# Patient Record
Sex: FEMALE | Race: WHITE | NOT HISPANIC OR LATINO | Employment: FULL TIME | URBAN - METROPOLITAN AREA
[De-identification: names, ages, dates, MRNs, and addresses within clinical notes are randomized per-mention and may not be internally consistent; named-entity substitution may affect disease eponyms.]

---

## 2018-05-23 ENCOUNTER — CONVERSION ENCOUNTER (OUTPATIENT)
Dept: SURGERY | Facility: CLINIC | Age: 23
End: 2018-05-23

## 2018-05-23 ENCOUNTER — OFFICE VISIT CONVERTED (OUTPATIENT)
Dept: SURGERY | Facility: CLINIC | Age: 23
End: 2018-05-23
Attending: SURGERY

## 2019-07-16 ENCOUNTER — OFFICE VISIT CONVERTED (OUTPATIENT)
Dept: SURGERY | Facility: CLINIC | Age: 24
End: 2019-07-16
Attending: SURGERY

## 2019-08-15 ENCOUNTER — HOSPITAL ENCOUNTER (OUTPATIENT)
Dept: PERIOP | Facility: HOSPITAL | Age: 24
Setting detail: HOSPITAL OUTPATIENT SURGERY
Discharge: HOME | End: 2019-08-15
Attending: SURGERY

## 2019-08-15 LAB — HCG UR QL: NEGATIVE

## 2019-08-30 ENCOUNTER — OFFICE VISIT CONVERTED (OUTPATIENT)
Dept: SURGERY | Facility: CLINIC | Age: 24
End: 2019-08-30
Attending: SURGERY

## 2019-10-15 ENCOUNTER — HOSPITAL ENCOUNTER (OUTPATIENT)
Dept: PHYSICAL THERAPY | Facility: CLINIC | Age: 24
Setting detail: RECURRING SERIES
Discharge: HOME | End: 2019-11-19
Attending: NURSE PRACTITIONER

## 2019-11-21 ENCOUNTER — OFFICE VISIT CONVERTED (OUTPATIENT)
Dept: NEUROLOGY | Facility: CLINIC | Age: 24
End: 2019-11-21
Attending: PSYCHIATRY & NEUROLOGY

## 2020-04-17 ENCOUNTER — TELEMEDICINE CONVERTED (OUTPATIENT)
Dept: NEUROLOGY | Facility: CLINIC | Age: 25
End: 2020-04-17
Attending: PSYCHIATRY & NEUROLOGY

## 2020-06-24 ENCOUNTER — CONVERSION ENCOUNTER (OUTPATIENT)
Dept: NEUROLOGY | Facility: CLINIC | Age: 25
End: 2020-06-24

## 2020-06-24 ENCOUNTER — OFFICE VISIT CONVERTED (OUTPATIENT)
Dept: NEUROLOGY | Facility: CLINIC | Age: 25
End: 2020-06-24
Attending: PSYCHIATRY & NEUROLOGY

## 2020-08-20 ENCOUNTER — OFFICE VISIT CONVERTED (OUTPATIENT)
Dept: NEUROLOGY | Facility: CLINIC | Age: 25
End: 2020-08-20
Attending: PSYCHIATRY & NEUROLOGY

## 2020-10-20 ENCOUNTER — HOSPITAL ENCOUNTER (OUTPATIENT)
Dept: SLEEP MEDICINE | Facility: HOSPITAL | Age: 25
Discharge: HOME | End: 2020-10-20
Attending: PSYCHIATRY & NEUROLOGY

## 2020-11-20 ENCOUNTER — OFFICE VISIT CONVERTED (OUTPATIENT)
Dept: NEUROLOGY | Facility: CLINIC | Age: 25
End: 2020-11-20
Attending: PSYCHIATRY & NEUROLOGY

## 2021-02-23 ENCOUNTER — OFFICE VISIT CONVERTED (OUTPATIENT)
Dept: NEUROLOGY | Facility: CLINIC | Age: 26
End: 2021-02-23
Attending: PSYCHIATRY & NEUROLOGY

## 2021-05-12 NOTE — PROGRESS NOTES
Progress Note      Patient Name: Mima Wolfe   Patient ID: [id number]   Sex: Female   YOB: 1995    Primary Care Provider: Wilbur Avendaño Jr. MD   Referring Provider: Bonnie MENDEZ    Visit Date: April 17, 2020    Provider: Zane Wilkerson MD   Location: [facility]   Location Address: [address] [place] [address]  [id number]   Location Phone: [phone]          Chief Complaint     former [place] patient SAMANIEGO       History Of Present Illness  Video Conferencing Visit  Mima Wolfe is a 24 year old /White female who is presenting for evaluation via video conferencing. Verbal consent obtained before beginning visit.   The following staff were present during this visit: INPUT BOX   Mima Wolfe is a 24 year old /White female who presents today to [facility] today referred from Bonnie MENDEZ.      24-year-old woman video telehealth visit for headaches.  She states that she is having a headache at least 2-3 times a week lasting for a few hours to a day.  She failed topiramate in the past.  She failed amitriptyline.  She stopped taking amitriptyline 2 months ago.  She took it for 2 months.  She states that the headaches are associated light and noise sensitivity, nausea.  She had a Chiari malformation decompression years ago.  She is supposed to get CGRP inhibitors for follow-up.       Past Medical History  Gallstones; Migraines         Past Surgical History  Brain Surgery; Cholecystectomy; EYE SURGERY; [place] Tooth Extraction         Medication List  amitriptyline 25 mg oral tablet; Lo Loestrin Fe 1 mg-10 mcg (24)/10 mcg (2) oral tablet         Allergy List  Zithromax         Family Medical History  Stroke; Cancer, Unspecified; Diabetes         Social History  Alcohol (Current some day); lives alone; Single; Tobacco (Never); Working         Review of Systems  · Constitutional  o Denies  o : chills, excessive  sweating, fatigue, fever, sycope/passing out, weight gain, weight loss  · Eyes  o Admits  o : changes in vision  o Denies  o : blurry vision, double vision  · HENT  o Denies  o : loss of hearing, ringing in the ears, ear aches, sore throat, nasal congestion, sinus pain, nose bleeds, seasonal allergies  · Cardiovascular  o Denies  o : blood clots, swollen legs, anemia, easy burising or bleeding, transfusions  · Respiratory  o Denies  o : shortness of breath, dry cough, productive cough, pneumonia, COPD  · Gastrointestinal  o Denies  o : difficulty swallowing, reflux  · Genitourinary  o Denies  o : incontinence  · Neurologic  o Admits  o : headache  o Denies  o : seizure, stroke, tremor, loss of balance, falls, dizziness/vertigo, difficulty with sleep, numbness/tingling/paresthesia , difficulty with coordination, difficulty with dexterity, weakness  · Musculoskeletal  o Denies  o : neck stiffness/pain, swollen lymph nodes, muscle aches, joint pain, weakness, spasms, sciatica, pain radiating in arm, pain radiating in leg, low back pain  · Endocrine  o Denies  o : diabetes, thyroid disorder  · Psychiatric  o Admits  o : anxiety  o Denies  o : depression      Physical Examination     Alert, fluent, phasic, follows commands well.           Assessment  · Chronic migraine without aura     346.70/G43.709  I will see her again in the next 10 days to 14 days to have CGRP inhibitor injection.    Problems Reconciled  Plan  · Medications  o Medications have been Reconciled  o Transition of Care or Provider Policy  · Instructions  o Follow Up in 2 weeks.            Electronically Signed by: Zane Wilkerson MD -Author on April 17, 2020 03:07:46 PM

## 2021-05-13 NOTE — PROGRESS NOTES
Progress Note      Patient Name: Mima Wolfe   Patient ID: [id number]   Sex: Female   YOB: 1995    Primary Care Provider: Wilbur Avendaño Jr. MD   Referring Provider: Bonnie MENDEZ    Visit Date: June 24, 2020    Provider: Zane Wilkerson MD   Location: [facility]   Location Address: [address] [place] [address]  [id number]   Location Phone: [phone]          Chief Complaint     F/u chronic migraine without aura, post two Aimovig injections.       History Of Present Illness  Mima Wolfe is a 24 year old /White female who presents today to [facility] today referred from Bonnie MENDEZ.      24-year-old woman here for follow-up for migraine headaches.  She is taking Aimovig 70 mg that she is had 2 injections.  She states her headaches have significantly improved she is no longer having headaches.  She states that she may get a mild headache 2 times a week which is very slight and she has not had any migraine headaches.  She states in the past she was getting 3-4 severe headaches a week and a daily headache.    She states that the first month she did not know if it was the Aimovig or her Chiari malformation at had her getting intermittently weak and tingling in her hands and feet lasting for 2 hours at a time 2-3 times a week.  In the last month she is not had any symptoms.       Past Medical History  Gallstones; Migraines         Past Surgical History  Brain Surgery; Cholecystectomy; EYE SURGERY; [place] Tooth Extraction         Medication List  Aimovig Autoinjector 70 mg/mL subcutaneous auto-injector; amitriptyline 25 mg oral tablet; Lo Loestrin Fe 1 mg-10 mcg (24)/10 mcg (2) oral tablet; sertraline 50 mg oral tablet         Allergy List  Zithromax         Family Medical History  Stroke; Cancer, Unspecified; Diabetes         Social History  Alcohol (Current some day); lives alone; Single; Tobacco (Never); Working  "        Review of Systems  · Constitutional  o Admits  o : fatigue  o Denies  o : chills, excessive sweating, fever, sycope/passing out, weight gain, weight loss  · Eyes  o Admits  o : changes in vision  o Denies  o : blurry vision, double vision  · HENT  o Denies  o : loss of hearing, ringing in the ears, ear aches, sore throat, nasal congestion, sinus pain, nose bleeds, seasonal allergies  · Cardiovascular  o Denies  o : blood clots, swollen legs, anemia, easy burising or bleeding, transfusions  · Respiratory  o Denies  o : shortness of breath, dry cough, productive cough, pneumonia, COPD  · Gastrointestinal  o Denies  o : difficulty swallowing, reflux  · Genitourinary  o Denies  o : incontinence  · Neurologic  o Admits  o : headache, dizziness/vertigo, weakness  o Denies  o : seizure, stroke, tremor, loss of balance, falls, difficulty with sleep, numbness/tingling/paresthesia , difficulty with coordination, difficulty with dexterity  · Musculoskeletal  o Denies  o : neck stiffness/pain, swollen lymph nodes, muscle aches, joint pain, weakness, spasms, sciatica, pain radiating in arm, pain radiating in leg, low back pain  · Endocrine  o Denies  o : diabetes, thyroid disorder  · Psychiatric  o Admits  o : anxiety  o Denies  o : depression      Vitals  Date Time BP Position Site L\R Cuff Size HR RR TEMP (F) WT  HT  BMI kg/m2 BSA m2 O2 Sat HC       06/24/2020 03:45 PM        97.6         06/24/2020 04:18 /63 Sitting    185 - R   184lbs 16oz 5'  3\" 32.77 1.93           Physical Examination     She is alert, fluent, phasic, follows commands well.  Optic disks are normal bilaterally, visual fields are full, EOMs full directions gaze, facial strength is full, soft palate elevation and tongue normal.  There is no weakness of the upper or lower extremities.  Reflexes are normoactive and symmetrical.  Heart is regular in rhythm and normal in rate           Assessment  · Chronic migraine without " vianney     346.70/G43.709  She is to continue taking Aimovig 70 mg subcutaneously monthly. I will see her again in 6 months time for follow-up. I told her that if her headaches are controlled at the time I might take her off Aimovig.    I told her that her Chiari malformation is not symptomatic.    Problems Reconciled  Plan  · Medications  o Medications have been Reconciled  o Transition of Care or Provider Policy  · Instructions  o Encouraged to follow-up with Primary Care Provider for preventative care.  o Follow up in 6 months.             Electronically Signed by: Zane Wilkerson MD -Author on June 24, 2020 05:02:34 PM

## 2021-05-13 NOTE — PROGRESS NOTES
Progress Note      Patient Name: Mima Wolfe   Patient ID: [id number]   Sex: Female   YOB: 1995    Primary Care Provider: Wilbur Avendaño Jr. MD   Referring Provider: Bonnie MENDEZ    Visit Date: August 20, 2020    Provider: Zane Wilkerson MD   Location: [facility]   Location Address: [address] [place] [address]  [id number]   Location Phone: [phone]          Chief Complaint     F/u chronic migraine without aura.       History Of Present Illness  Mima Wolfe is a 24 year old /White female who presents today to [facility] today referred from Bonnie MENDEZ.      24-year-old woman here for follow-up for headaches.  She states that she has had 4 to 5 injections of Aimovig 70 mg and it was working for her that she was only getting 3 severe headaches a month that lasted for 2 hours at a time.  She states in the last 3 weeks has had a headache almost on a daily basis and this lasting for 8 hours.  She states that she is getting headache at least 8 times a month the last few months to get a moderate headache that she will take pain medication such as Excedrin or ibuprofen.  She was seen by an optometrist and was told that she has pseudotumor cerebri and was seen by another optometrist at [place] and [facility] told her that she did not.  She went to the emergency room because of the severe headaches and she had a lumbar puncture in which she had an opening pressure of 22 cm but she was in a fetal position when the pressure was obtained.  The rest of her spinal fluid was unremarkable.    She had a history of Chiari I malformation surgery in 2007 at [facility].  She states that she is not under a lot of stress.  She states most of her headaches get worse when she wakes up in the morning.  She states that even when her headaches were controlled she was feeling tired and sleepy during the daytime and did not feel well  rested in the morning.  This been going on for years.  She is living by herself.  She has no history of depression.       Past Medical History  Gallstones; Migraines         Past Surgical History  Brain Surgery; Cholecystectomy; EYE SURGERY; [place] Tooth Extraction         Medication List  Aimovig Autoinjector 70 mg/mL subcutaneous auto-injector; Xulane 150-35 mcg/24 hr transdermal patch weekly         Allergy List  Reglan         Family Medical History  Stroke; Cancer, Unspecified; Diabetes         Social History  Alcohol (Current some day); lives alone; Single; Tobacco (Never); Working         Review of Systems  · Constitutional  o Admits  o : fatigue  o Denies  o : chills, excessive sweating, fever, sycope/passing out, weight gain, weight loss  · Eyes  o Admits  o : changes in vision, blurry vision, double vision  · HENT  o Denies  o : loss of hearing, ringing in the ears, ear aches, sore throat, nasal congestion, sinus pain, nose bleeds, seasonal allergies  · Cardiovascular  o Denies  o : blood clots, swollen legs, anemia, easy burising or bleeding, transfusions  · Respiratory  o Denies  o : shortness of breath, dry cough, productive cough, pneumonia, COPD  · Gastrointestinal  o Denies  o : difficulty swallowing, reflux  · Genitourinary  o Denies  o : incontinence  · Neurologic  o Admits  o : headache, falls, dizziness/vertigo, weakness  o Denies  o : seizure, stroke, tremor, loss of balance, difficulty with sleep, numbness/tingling/paresthesia , difficulty with coordination, difficulty with dexterity  · Musculoskeletal  o Admits  o : neck stiffness/pain, muscle aches, joint pain, weakness, spasms  o Denies  o : swollen lymph nodes, sciatica, pain radiating in arm, pain radiating in leg, low back pain  · Endocrine  o Denies  o : diabetes, thyroid disorder  · Psychiatric  o Admits  o : anxiety  o Denies  o : depression      Vitals  Date Time BP Position Site L\R Cuff Size HR RR TEMP (F) WT  HT  BMI kg/m2 BSA m2  "O2 Sat HC       08/20/2020 03:40 PM        97.3         08/20/2020 03:58 /61 Sitting    88 - R   188lbs 0oz 5'  3\" 33.3 1.95           Physical Examination  · Constitutional  o Appearance  o : well-nourished, well groomed, in no apparent distress  · Eyes  o Pupils and Irises  o : Pupils equal, round, and reactive to light and accommodation bilaterally  · Cardiovascular  o Heart  o :   § Auscultation of Heart  § : Regular rate and rhythm   · Musculoskeletal  o General  o : Normal bulk and normal tone throughout. 5/5 motor strength throughout and symmetric.   · Neurologic  o Mental Status Examination  o :   § Fund of Knowledge  § : Adequate fund of knowledge  o Cranial Nerves  o : Pupils are equal, round and reactive to light. Extraocular movements are intact. Visual fields are full. Fundoscopic examination reveals sharp disc bilaterally. Muscles of facial expression are strong and symmetric.   o Gait and Station  o :   § Gait Screening  § : Normal, narrow based gait, with a normal arm swing. is able to tiptoe, heel walk, ish and tandem without difficulty.          Assessment  · Chronic migraine without aura     346.70/G43.709  I will increase her Aimovig to 140 mg monthly subcutaneously. She is to call us in the next 2 month if there is no improvement of her headaches and I may schedule her for Botox injections. She just received her Aimovig 70 mg last week and therefore she is not going to inject herself with the 140 mg dose until next month. For abortive treatment I will give her sumatriptan 50 mg at onset of headache and repeat again endorses needed maximum 2/day or 8/month. Told her not to take sumatriptan more than 4 migraines a month. I also told her not to take over-the-counter medication such as Tylenol, Excedrin, ibuprofen for headaches. I will also give her Nurtec and gave her literature and a card. He is to call us for any problems. I told her that she can take down or take 1 time a day or up to 8 " migraines a month. Explained to her the adverse effects of the medication.  · Excessive daytime sleepiness     780.54/G47.19  I will set her up for sleep consultation at [facility] sleep clinic. I will see her again in 3 months time for follow-up.    40 minutes was spent for this high complexity visit and more than half the time was spent face-to-face with the patient examination, counseling, planning, reviewed the chart and treatment.    Problems Reconciled  Plan  · Orders  o Sleep Disorder Clinic Consultation (SLEEP) - 346.70/G43.709 - 2020   Excessive daytime somnolence  · Medications  o sumatriptan succinate 50 mg oral tablet   SI at onset of headache and repeat again in 2 hours as needed maximum of 2/day or 8/month.   DISP: (9) tablets with 6 refills  Prescribed on 2020     o Nurtec ODT 75 mg oral tablet,disintegrating   SIG: take 1tab(75mg) place on top of tongue, allow to dissolve then swallow orally once prn; max 1 dose/24hrs   DISP: (8) tablets with 4 refills  Adjusted on 2020     o Medications have been Reconciled  o Transition of Care or Provider Policy  · Instructions  o Encouraged to follow-up with Primary Care Provider for preventative care.  o Follow up in 3 months.             Electronically Signed by: Zane Wilkerson MD -Author on 2020 05:07:06 PM

## 2021-05-14 VITALS
HEIGHT: 63 IN | SYSTOLIC BLOOD PRESSURE: 112 MMHG | DIASTOLIC BLOOD PRESSURE: 76 MMHG | BODY MASS INDEX: 34.39 KG/M2 | WEIGHT: 194.12 LBS | HEART RATE: 86 BPM

## 2021-05-14 VITALS
DIASTOLIC BLOOD PRESSURE: 84 MMHG | WEIGHT: 95 LBS | BODY MASS INDEX: 16.83 KG/M2 | TEMPERATURE: 97.5 F | HEIGHT: 63 IN | HEART RATE: 95 BPM | SYSTOLIC BLOOD PRESSURE: 134 MMHG

## 2021-05-14 VITALS
SYSTOLIC BLOOD PRESSURE: 116 MMHG | BODY MASS INDEX: 33.31 KG/M2 | DIASTOLIC BLOOD PRESSURE: 61 MMHG | HEIGHT: 63 IN | WEIGHT: 188 LBS | HEART RATE: 88 BPM | TEMPERATURE: 97.3 F

## 2021-05-14 NOTE — PROGRESS NOTES
Progress Note      Patient Name: Mima Wolfe   Patient ID: [id number]   Sex: Female   YOB: 1995    Primary Care Provider: Wilbur Avendaño Jr. MD   Referring Provider: Bonnie MENDEZ    Visit Date: February 23, 2021    Provider: Zane Wilkerson MD   Location: [facility]   Location Address: [address] [place] [address]  [id number]   Location Phone: [phone]          Chief Complaint     Patient here for migraine follow up       History Of Present Illness  Mima Wolfe is a 25 year old /White female who presents today to [facility] today referred from Bonnie MENDEZ.      25-year-old woman here for follow-up for headaches patient states her headaches have significantly improved.  She has mild to moderate headaches that usually go away with Nurtec.  In January she took to neurotoxin in February just 1.  She stopped taking Aimovig in December.  Prior to that she took Aimovig 70 mg in November.  She was without Aimovig for 2 months prior to that.  She states that her insurance changed and passport will no longer pay for Aimovig.  She states that the Nurtec helps her significantly.  In the past she took Imitrex and Maxalt that did not help her significantly.       Past Medical History  Gallstones; Migraines         Past Surgical History  Brain Surgery; Cholecystectomy; EYE SURGERY; [place] Tooth Extraction         Medication List  Aimovig Autoinjector 140 mg/mL subcutaneous auto-injector; Kyleena 17.5 mcg/24 hrs (5 yrs) 19.5 mg intrauterine intrauterine device; Nurtec ODT 75 mg oral tablet,disintegrating         Allergy List  Reglan         Family Medical History  Stroke; Cancer, Unspecified; Diabetes         Social History  Alcohol (Current some day); lives alone; Single; Tobacco (Never); Working         Review of Systems  · Constitutional  o Denies  o : chills, excessive sweating, fatigue, fever, sycope/passing  "out, weight gain, weight loss  · Eyes  o Denies  o : changes in vision, blurred vision, double vision  · HENT  o Denies  o : hearing loss, ringing in the ears, ear aches, sore throat, nasal congestion, sinus pain, nose bleeds, seasonal allergies  · Cardiovascular  o Denies  o : blood clots, swollen legs, anemia, easy burising or bleeding, transfusions  · Respiratory  o Denies  o : shortness of breath, dry cough, productive cough, pneumonia, COPD  · Gastrointestinal  o Denies  o : dysphagia, reflux  · Genitourinary  o Denies  o : incontinence  · Neurologic  o Admits  o : headache  o Denies  o : seizure, stroke, tremor, loss of balance, falls, dizziness/vertigo, difficulty with sleep, numbness/tingling/paresthesia , difficulty with coordination, difficulty with dexterity, weakness  · Musculoskeletal  o Denies  o : neck stiffness/pain, swollen lymph nodes, muscle aches, joint pain, weakness, spasms, sciatica, pain radiating in arm, pain radiating in leg, low back pain  · Endocrine  o Denies  o : diabetes, thyroid disorder  · Psychiatric  o Denies  o : anxiety, depression      Vitals  Date Time BP Position Site L\R Cuff Size HR RR TEMP (F) WT  HT  BMI kg/m2 BSA m2 O2 Sat FR L/min FiO2 HC       02/23/2021 03:16 /76 Sitting    86 - R   194lbs 2oz 5'  3\" 34.39 1.98             Physical Examination     She is alert, fluent, phasic, follows commands well.  Optic nerves are normal bilaterally.  There is no focal weakness.  Heart is regular in rhythm normal in rate.           Assessment  · Chronic migraine without aura     346.70/G43.709  She is to take Nurtec for abortive treatment. Should her headaches become more frequent in the future having 2-3 headaches that are severe not responsive to abortive treatment she can call us and we will prescribe to her Em[facility]. Otherwise I will see her again in 6 months time for follow-up. Should her headaches be controlled at that time she can cancel appointment.    Total time spent " with patient coordinating patient care was 15 minutes.      Plan  · Medications  o Medications have been Reconciled  o Transition of Care or Provider Policy  · Instructions  o Encouraged to follow-up with Primary Care Provider for preventative care.            Electronically Signed by: Zane Wilkerson MD -Author on February 23, 2021 04:03:47 PM

## 2021-05-15 VITALS
SYSTOLIC BLOOD PRESSURE: 126 MMHG | HEART RATE: 185 BPM | WEIGHT: 185 LBS | BODY MASS INDEX: 32.78 KG/M2 | DIASTOLIC BLOOD PRESSURE: 63 MMHG | HEIGHT: 63 IN | TEMPERATURE: 97.6 F

## 2021-05-15 VITALS
HEIGHT: 63 IN | HEART RATE: 78 BPM | WEIGHT: 191 LBS | BODY MASS INDEX: 33.84 KG/M2 | DIASTOLIC BLOOD PRESSURE: 63 MMHG | SYSTOLIC BLOOD PRESSURE: 113 MMHG

## 2021-05-15 VITALS — WEIGHT: 191 LBS | RESPIRATION RATE: 16 BRPM | HEIGHT: 64 IN | BODY MASS INDEX: 32.61 KG/M2

## 2021-05-15 VITALS — WEIGHT: 190 LBS | BODY MASS INDEX: 32.44 KG/M2 | RESPIRATION RATE: 18 BRPM | HEIGHT: 64 IN

## 2021-05-16 VITALS — BODY MASS INDEX: 27.29 KG/M2 | WEIGHT: 154 LBS | HEIGHT: 63 IN | RESPIRATION RATE: 14 BRPM

## 2021-07-27 ENCOUNTER — PREP FOR SURGERY (OUTPATIENT)
Dept: OTHER | Facility: HOSPITAL | Age: 26
End: 2021-07-27

## 2021-07-27 ENCOUNTER — OFFICE VISIT (OUTPATIENT)
Dept: GASTROENTEROLOGY | Facility: CLINIC | Age: 26
End: 2021-07-27

## 2021-07-27 VITALS
DIASTOLIC BLOOD PRESSURE: 58 MMHG | SYSTOLIC BLOOD PRESSURE: 111 MMHG | HEART RATE: 82 BPM | WEIGHT: 198.5 LBS | BODY MASS INDEX: 33.89 KG/M2 | OXYGEN SATURATION: 100 % | RESPIRATION RATE: 20 BRPM | HEIGHT: 64 IN

## 2021-07-27 DIAGNOSIS — R14.0 ABDOMINAL BLOATING: ICD-10-CM

## 2021-07-27 DIAGNOSIS — R10.84 GENERALIZED ABDOMINAL PAIN: ICD-10-CM

## 2021-07-27 DIAGNOSIS — R93.5 ABNORMAL ABDOMINAL CT SCAN: ICD-10-CM

## 2021-07-27 DIAGNOSIS — K21.9 GASTROESOPHAGEAL REFLUX DISEASE, UNSPECIFIED WHETHER ESOPHAGITIS PRESENT: ICD-10-CM

## 2021-07-27 DIAGNOSIS — Z80.0 FAMILY HISTORY OF COLON CANCER: Primary | ICD-10-CM

## 2021-07-27 DIAGNOSIS — R19.7 DIARRHEA, UNSPECIFIED TYPE: ICD-10-CM

## 2021-07-27 DIAGNOSIS — R11.0 NAUSEA: ICD-10-CM

## 2021-07-27 PROCEDURE — 99204 OFFICE O/P NEW MOD 45 MIN: CPT | Performed by: NURSE PRACTITIONER

## 2021-07-27 RX ORDER — FAMOTIDINE 20 MG/1
20 TABLET, FILM COATED ORAL
Qty: 30 TABLET | Refills: 2 | Status: SHIPPED | OUTPATIENT
Start: 2021-07-27 | End: 2021-11-30

## 2021-07-27 RX ORDER — MONTELUKAST SODIUM 4 MG/1
2 TABLET, CHEWABLE ORAL 2 TIMES DAILY
Qty: 120 TABLET | Refills: 2 | Status: SHIPPED | OUTPATIENT
Start: 2021-07-27 | End: 2021-11-30

## 2021-07-27 RX ORDER — BENZOYL PEROXIDE 2.5 G/100G
GEL TOPICAL
COMMUNITY
Start: 2021-06-30 | End: 2021-11-30

## 2021-07-27 RX ORDER — TRETINOIN 0.025 %
CREAM (GRAM) TOPICAL
COMMUNITY
Start: 2021-07-24 | End: 2021-11-12

## 2021-07-27 NOTE — PROGRESS NOTES
Chief Complaint  Establish Care (irregular bowel movements), Gas, Bloated, Nausea, Diarrhea, and Abdominal Pain    Mima Wolfe is a 25 y.o. female who presents to [facility] as a new patient.    History of present illness    25-year-old female presented to the office today as a new patient with a history of diarrhea, gas, bloating, abdominal pain, cholecystectomy in 2019, reflux and nausea.  Patient reports that her symptoms have been present for over 2 years now and feels like they are worse after her gallbladder was removed.  She reports postprandial diarrhea that occurs 5-10 times a day that is loose in texture.  Denies nocturnal diarrhea, melena, hematochezia.  She reports constant nausea that occurs daily.  Reports heartburn that causes intermittent chest pain.  She has a strong family history of colorectal cancer in her aunt at age 30 and and her uncle at age 40.  Patient has never had a colonoscopy.  She reports that her mother and her brother have similar gastro problems including nausea and diarrhea.  She reports abdominal pain diffuse throughout the abdomen with bloating sensation and excessive gas production.  Patient denies fever, nausea, vomiting, weight loss, night sweats, melena, hematochezia, hematemesis.      CT scan of abdomen and pelvis with contrast 8/22/2019 occurred 1 week after her cholecystectomy report is listed below: 3.5 x 1.3 cm hypodense collection within the post cholecystectomy bed may correlate with     expected postoperative changes.  Other etiologies such as hemorrhagic sequela or developing   infection cannot be excluded.    2. 2.4 x 2 cm peripherally enhancing collection abutting the proximal duodenum with punctate gas     pocket may correlate with small abscess.  The location is not amenable to CT directed drainage.    3.  3.9 x 2.4 cm hypodense collection along Morison's pouch either from loculated collection or   possibly subcapsular  "hematoma.  Follow-up to exclude developing infection recommended.    4.  4 mm in thick hypodense collection along the lateral liver edge possibly small subcapsular     hematoma.  Close follow-up recommended.          Result Review :   The following data was reviewed by: Rachna Pack NP on 07/27/2021 for           Past Medical History:   Diagnosis Date   • Cholelithiasis    • Migraine        Past Surgical History:   Procedure Laterality Date   • BRAIN SURGERY     • CHOLECYSTECTOMY     • EYE SURGERY     • WISDOM TOOTH EXTRACTION           Current Outpatient Medications:   •  benzoyl peroxide 2.5 % gel, APPLY THIN LAYER TOPICALLY TO THE AFFECTED AREA TWICE DAILY, Disp: , Rfl:   •  Retin-A 0.025 % cream, , Disp: , Rfl:   •  colestipol (Colestid) 1 g tablet, Take 2 tablets by mouth 2 (Two) Times a Day for 30 days., Disp: 120 tablet, Rfl: 2  •  famotidine (Pepcid) 20 MG tablet, Take 1 tablet by mouth every night at bedtime., Disp: 30 tablet, Rfl: 2     Allergies   Allergen Reactions   • Metoclopramide Shortness Of Breath       Family History   Problem Relation Age of Onset   • Diabetes Father         borderline   • Colon cancer Maternal Aunt    • Colon cancer Maternal Uncle         Social History     Social History Narrative   • Not on file       Objective     Vital Signs:   /58 (BP Location: Left arm, Patient Position: Sitting, Cuff Size: Adult)   Pulse 82   Resp 20   Ht 162.6 cm (64\")   Wt 90 kg (198 lb 8 oz)   SpO2 100% Comment: room air  BMI 34.07 kg/m²     Body mass index is 34.07 kg/m².    Physical Exam  Constitutional:       General: She is not in acute distress.     Appearance: Normal appearance. She is well-developed and normal weight.   Eyes:      Conjunctiva/sclera: Conjunctivae normal.      Pupils: Pupils are equal, round, and reactive to light.      Visual Fields: Right eye visual fields normal and left eye visual fields normal.   Cardiovascular:      Rate and Rhythm: Normal rate and regular " rhythm.      Heart sounds: Normal heart sounds.   Pulmonary:      Effort: Pulmonary effort is normal. No retractions.      Breath sounds: Normal breath sounds and air entry.      Comments: Inspection of chest: normal appearance  Abdominal:      General: Bowel sounds are normal.      Palpations: Abdomen is soft.      Tenderness: There is no abdominal tenderness.      Comments: No appreciable hepatosplenomegaly   Musculoskeletal:      Cervical back: Neck supple.      Right lower leg: No edema.      Left lower leg: No edema.   Lymphadenopathy:      Cervical: No cervical adenopathy.   Skin:     Findings: No lesion.      Comments: Turgor normal   Neurological:      Mental Status: She is alert and oriented to person, place, and time.   Psychiatric:         Mood and Affect: Mood and affect normal.                 Assessment and Plan    Diagnoses and all orders for this visit:    1. Family history of colon cancer (Primary)    2. Gastroesophageal reflux disease, unspecified whether esophagitis present    3. Nausea  -     US Abdomen Limited; Future    4. Diarrhea, unspecified type    5. Generalized abdominal pain  -     US Abdomen Limited; Future    6. Abdominal bloating  -     US Abdomen Limited; Future    7. Abnormal abdominal CT scan  -     US Abdomen Limited; Future    Other orders  -     colestipol (Colestid) 1 g tablet; Take 2 tablets by mouth 2 (Two) Times a Day for 30 days.  Dispense: 120 tablet; Refill: 2  -     famotidine (Pepcid) 20 MG tablet; Take 1 tablet by mouth every night at bedtime.  Dispense: 30 tablet; Refill: 2    25-year-old female presented to the office today as a new patient with a history of diarrhea, gas, bloating, abdominal pain, cholecystectomy in 2019, reflux and nausea.  Patient has strong family history of colorectal cancer and I have recommended patient undergo EGD and colonoscopy at this time.  I have reviewed the risk benefits of the procedure with the patient.  I have also ordered an  ultrasound of the right upper quadrant related to an abnormal CT scan of the abdomen back in 2019 showing a 4 mm hypodense collection of the liver.  I have prescribed colestipol 2 g twice daily for her diarrhea.  I have also prescribed famotidine 20 mg to take at night to see if this helps improve her nausea.  Patient will follow up in the office after EGD and colonoscopy.  Patient is agreeable to this plan.  To call with any questions or concerns.            Follow Up   Return for Follow up after endoscopy in office.  Patient was given instructions and counseling regarding her condition or for health maintenance advice. Please see specific information pulled into the AVS if appropriate.

## 2021-07-27 NOTE — PATIENT INSTRUCTIONS
Diarrhea, Adult  Diarrhea is frequent loose and watery bowel movements. Diarrhea can make you feel weak and cause you to become dehydrated. Dehydration can make you tired and thirsty, cause you to have a dry mouth, and decrease how often you urinate.  Diarrhea typically lasts 2-3 days. However, it can last longer if it is a sign of something more serious. It is important to treat your diarrhea as told by your health care provider.  Follow these instructions at home:  Eating and drinking         Follow these recommendations as told by your health care provider:  · Take an oral rehydration solution (ORS). This is an over-the-counter medicine that helps return your body to its normal balance of nutrients and water. It is found at pharmacies and retail stores.  · Drink plenty of fluids, such as water, ice chips, diluted fruit juice, and low-calorie sports drinks. You can drink milk also, if desired.  · Avoid drinking fluids that contain a lot of sugar or caffeine, such as energy drinks, sports drinks, and soda.  · Eat bland, easy-to-digest foods in small amounts as you are able. These foods include bananas, applesauce, rice, lean meats, toast, and crackers.  · Avoid alcohol.  · Avoid spicy or fatty foods.    Medicines  · Take over-the-counter and prescription medicines only as told by your health care provider.  · If you were prescribed an antibiotic medicine, take it as told by your health care provider. Do not stop using the antibiotic even if you start to feel better.  General instructions    · Wash your hands often using soap and water. If soap and water are not available, use a hand . Others in the household should wash their hands as well. Hands should be washed:  ? After using the toilet or changing a diaper.  ? Before preparing, cooking, or serving food.  ? While caring for a sick person or while visiting someone in a hospital.  · Drink enough fluid to keep your urine pale yellow.  · Rest at home while  you recover.  · Watch your condition for any changes.  · Take a warm bath to relieve any burning or pain from frequent diarrhea episodes.  · Keep all follow-up visits as told by your health care provider. This is important.  Contact a health care provider if:  · You have a fever.  · Your diarrhea gets worse.  · You have new symptoms.  · You cannot keep fluids down.  · You feel light-headed or dizzy.  · You have a headache.  · You have muscle cramps.  Get help right away if:  · You have chest pain.  · You feel extremely weak or you faint.  · You have bloody or black stools or stools that look like tar.  · You have severe pain, cramping, or bloating in your abdomen.  · You have trouble breathing or you are breathing very quickly.  · Your heart is beating very quickly.  · Your skin feels cold and clammy.  · You feel confused.  · You have signs of dehydration, such as:  ? Dark urine, very little urine, or no urine.  ? Cracked lips.  ? Dry mouth.  ? Sunken eyes.  ? Sleepiness.  ? Weakness.  Summary  · Diarrhea is frequent loose and watery bowel movements. Diarrhea can make you feel weak and cause you to become dehydrated.  · Drink enough fluids to keep your urine pale yellow.  · Make sure that you wash your hands after using the toilet. If soap and water are not available, use hand .  · Contact a health care provider if your diarrhea gets worse or you have new symptoms.  · Get help right away if you have signs of dehydration.  This information is not intended to replace advice given to you by your health care provider. Make sure you discuss any questions you have with your health care provider.  Document Revised: 05/05/2020 Document Reviewed: 05/24/2019  [email or web address] Patient Education © 2021 [email or web address]

## 2021-08-10 ENCOUNTER — TELEPHONE (OUTPATIENT)
Dept: GASTROENTEROLOGY | Facility: CLINIC | Age: 26
End: 2021-08-10

## 2021-08-10 NOTE — TELEPHONE ENCOUNTER
PATIENT CALLED THE OFFICE STATING SHE HAD ABNORMAL LAB WORK AND WANTED ELVA'S OPINION. PATIENT HAD A HEPATITIS B SURFACE ANTIBODY RESULT AS REACTIVE. PATIENT IS CONCERNED. PLEASE ADVISE. PATIENT OK TO LEAVE A DETAILED VOICEMAIL IF SHE DOESN'T ANSWER DUE TO BEING AT WORK.

## 2021-08-12 ENCOUNTER — HOSPITAL ENCOUNTER (OUTPATIENT)
Dept: ULTRASOUND IMAGING | Facility: HOSPITAL | Age: 26
Discharge: HOME | End: 2021-08-12
Admitting: NURSE PRACTITIONER

## 2021-08-12 ENCOUNTER — TELEPHONE (OUTPATIENT)
Dept: GASTROENTEROLOGY | Facility: CLINIC | Age: 26
End: 2021-08-12

## 2021-08-12 DIAGNOSIS — R93.5 ABNORMAL ABDOMINAL CT SCAN: ICD-10-CM

## 2021-08-12 DIAGNOSIS — R10.84 GENERALIZED ABDOMINAL PAIN: ICD-10-CM

## 2021-08-12 DIAGNOSIS — R14.0 ABDOMINAL BLOATING: ICD-10-CM

## 2021-08-12 DIAGNOSIS — R11.0 NAUSEA: ICD-10-CM

## 2021-08-12 PROCEDURE — 76705 ECHO EXAM OF ABDOMEN: CPT

## 2021-08-12 NOTE — TELEPHONE ENCOUNTER
I have personally spoke with the patient and she has been made aware that this means that she has hepatitis B antibodies which indicates that the person is protected against the hep B virus.

## 2021-08-12 NOTE — TELEPHONE ENCOUNTER
OK PER LELAND TO LEAVE MESSAGE. I LEFT PATIENT A VOICEMAIL OF NORMAL US RESULTS. LEFT CALL BACK NUMBER IF PATIENT HAS ANY QUESTIONS TO CALL THE OFFICE.

## 2021-10-05 ENCOUNTER — TELEPHONE (OUTPATIENT)
Dept: OBSTETRICS AND GYNECOLOGY | Facility: CLINIC | Age: 26
End: 2021-10-05

## 2021-10-05 DIAGNOSIS — B96.89 BV (BACTERIAL VAGINOSIS): Primary | ICD-10-CM

## 2021-10-05 DIAGNOSIS — N76.0 BV (BACTERIAL VAGINOSIS): Primary | ICD-10-CM

## 2021-10-05 RX ORDER — METRONIDAZOLE 7.5 MG/G
GEL VAGINAL
Qty: 70 G | Refills: 1 | Status: SHIPPED | OUTPATIENT
Start: 2021-10-05 | End: 2021-10-12

## 2021-11-09 ENCOUNTER — OFFICE VISIT (OUTPATIENT)
Dept: OBSTETRICS AND GYNECOLOGY | Facility: CLINIC | Age: 26
End: 2021-11-09

## 2021-11-09 VITALS
WEIGHT: 195 LBS | HEART RATE: 93 BPM | HEIGHT: 64 IN | SYSTOLIC BLOOD PRESSURE: 128 MMHG | DIASTOLIC BLOOD PRESSURE: 81 MMHG | BODY MASS INDEX: 33.29 KG/M2

## 2021-11-09 DIAGNOSIS — N76.0 ACUTE VAGINITIS: Primary | ICD-10-CM

## 2021-11-09 DIAGNOSIS — R82.90 BAD ODOR OF URINE: ICD-10-CM

## 2021-11-09 LAB
C TRACH RRNA CVX QL NAA+PROBE: NOT DETECTED
CANDIDA SPECIES: NEGATIVE
GARDNERELLA VAGINALIS: NEGATIVE
HBV SURFACE AG SERPL QL IA: NORMAL
HCV AB SER DONR QL: NORMAL
HIV1+2 AB SER QL: NORMAL
N GONORRHOEA RRNA SPEC QL NAA+PROBE: NOT DETECTED
T PALLIDUM IGG SER QL: NORMAL
T VAGINALIS DNA VAG QL PROBE+SIG AMP: NEGATIVE

## 2021-11-09 PROCEDURE — G0432 EIA HIV-1/HIV-2 SCREEN: HCPCS | Performed by: OBSTETRICS & GYNECOLOGY

## 2021-11-09 PROCEDURE — 86696 HERPES SIMPLEX TYPE 2 TEST: CPT | Performed by: OBSTETRICS & GYNECOLOGY

## 2021-11-09 PROCEDURE — 86695 HERPES SIMPLEX TYPE 1 TEST: CPT | Performed by: OBSTETRICS & GYNECOLOGY

## 2021-11-09 PROCEDURE — 86780 TREPONEMA PALLIDUM: CPT | Performed by: OBSTETRICS & GYNECOLOGY

## 2021-11-09 PROCEDURE — 86803 HEPATITIS C AB TEST: CPT | Performed by: OBSTETRICS & GYNECOLOGY

## 2021-11-09 PROCEDURE — 87086 URINE CULTURE/COLONY COUNT: CPT | Performed by: OBSTETRICS & GYNECOLOGY

## 2021-11-09 PROCEDURE — 87340 HEPATITIS B SURFACE AG IA: CPT | Performed by: OBSTETRICS & GYNECOLOGY

## 2021-11-09 PROCEDURE — 99214 OFFICE O/P EST MOD 30 MIN: CPT | Performed by: OBSTETRICS & GYNECOLOGY

## 2021-11-09 PROCEDURE — 87491 CHLMYD TRACH DNA AMP PROBE: CPT | Performed by: OBSTETRICS & GYNECOLOGY

## 2021-11-09 PROCEDURE — 87480 CANDIDA DNA DIR PROBE: CPT | Performed by: OBSTETRICS & GYNECOLOGY

## 2021-11-09 PROCEDURE — 87510 GARDNER VAG DNA DIR PROBE: CPT | Performed by: OBSTETRICS & GYNECOLOGY

## 2021-11-09 PROCEDURE — 87591 N.GONORRHOEAE DNA AMP PROB: CPT | Performed by: OBSTETRICS & GYNECOLOGY

## 2021-11-09 PROCEDURE — 86694 HERPES SIMPLEX NES ANTBDY: CPT | Performed by: OBSTETRICS & GYNECOLOGY

## 2021-11-09 PROCEDURE — 87660 TRICHOMONAS VAGIN DIR PROBE: CPT | Performed by: OBSTETRICS & GYNECOLOGY

## 2021-11-09 NOTE — PROGRESS NOTES
"GYN Problem/Follow Up Visit    Chief Complaint   Patient presents with   • Vaginitis           HPI  Mima Wolfe is a 25 y.o. female, No obstetric history on file., who presents for vaginal d/c and strong urine smell. New sex partner. Also desires full std testing.       Additional OB/GYN History   Patient's last menstrual period was 10/30/2021.  Current contraception: contraceptive methods: None  Desires to: do not start contraception  Allergies : Metoclopramide     The additional following portions of the patient's history were reviewed and updated as appropriate: allergies, current medications, past family history, past medical history, past social history, past surgical history and problem list.    Review of Systems    I have reviewed and agree with the HPI, ROS, and historical information as entered above. Stephanie Her, APRN    Objective   /81   Pulse 93   Ht 162.6 cm (64\")   Wt 88.5 kg (195 lb)   LMP 10/30/2021   BMI 33.47 kg/m²     Physical Exam  Vitals reviewed.   Genitourinary:     General: Normal vulva.      Vagina: Vaginal discharge (scant amt white d/c) present. No erythema, tenderness, bleeding, lesions or prolapsed vaginal walls.      Cervix: No cervical motion tenderness, discharge, friability, lesion, erythema or cervical bleeding.   Skin:     General: Skin is warm and dry.   Neurological:      Mental Status: She is alert and oriented to person, place, and time.            Assessment and Plan    Diagnoses and all orders for this visit:    1. Acute vaginitis (Primary)  -     Gardnerella vaginalis, Trichomonas vaginalis, Candida albicans, DNA - Swab, Vagina  -     Chlamydia trachomatis, Neisseria gonorrhoeae, PCR - Swab, Cervix  -     Hepatitis B Surface Antigen  -     Hepatitis C Antibody  -     HIV-1 & HIV-2 Antibodies  -     T Pallidum Antibody (FTA-Ab)  -     HSV 1 & 2 - Specific Antibody, IgG  -     HSV Non-Specific Antibody, IgM    2. Bad odor of urine  -     Urine Culture - Urine, " Urine, Clean Catch        Counseling:  She understands the importance of having the above orders performed in a timely fashion.  She is encouraged to review her results online and/or contact or office if she has questions.     Follow Up:  Return if symptoms worsen or fail to improve.      Stephanie Her, ANDREA  11/09/2021

## 2021-11-10 LAB
BACTERIA SPEC AEROBE CULT: NORMAL
HSV1 IGG SER IA-ACNC: 26.3 INDEX (ref 0–0.9)
HSV1+2 IGM SER IA-ACNC: 1.15 RATIO (ref 0–0.9)
HSV2 IGG SER IA-ACNC: 1.49 INDEX (ref 0–0.9)
HSV2 IGG SERPL QL IA: POSITIVE

## 2021-11-12 NOTE — PRE-PROCEDURE INSTRUCTIONS
Called patient to discuss instructions for laxatives and and skin prep. Discussed clear liquid diet and pre-op medications. Patient takes aldactone in the evening. No other medications. Patient stated understanding. No other issues or concerns noted currently per patient.  Orders placed for urine pregs.   Patient is vaccinated for covid-19.

## 2021-11-15 ENCOUNTER — ANESTHESIA EVENT (OUTPATIENT)
Dept: GASTROENTEROLOGY | Facility: HOSPITAL | Age: 26
End: 2021-11-15

## 2021-11-15 ENCOUNTER — ANESTHESIA (OUTPATIENT)
Dept: GASTROENTEROLOGY | Facility: HOSPITAL | Age: 26
End: 2021-11-15

## 2021-11-15 ENCOUNTER — HOSPITAL ENCOUNTER (OUTPATIENT)
Facility: HOSPITAL | Age: 26
Setting detail: HOSPITAL OUTPATIENT SURGERY
Discharge: HOME | End: 2021-11-15
Attending: INTERNAL MEDICINE | Admitting: INTERNAL MEDICINE

## 2021-11-15 VITALS
WEIGHT: 194.45 LBS | OXYGEN SATURATION: 100 % | RESPIRATION RATE: 20 BRPM | DIASTOLIC BLOOD PRESSURE: 71 MMHG | SYSTOLIC BLOOD PRESSURE: 107 MMHG | BODY MASS INDEX: 33.2 KG/M2 | HEART RATE: 71 BPM | TEMPERATURE: 98.2 F | HEIGHT: 64 IN

## 2021-11-15 DIAGNOSIS — R11.0 NAUSEA: ICD-10-CM

## 2021-11-15 DIAGNOSIS — R10.84 GENERALIZED ABDOMINAL PAIN: ICD-10-CM

## 2021-11-15 DIAGNOSIS — R14.0 ABDOMINAL BLOATING: ICD-10-CM

## 2021-11-15 DIAGNOSIS — K21.9 GASTROESOPHAGEAL REFLUX DISEASE, UNSPECIFIED WHETHER ESOPHAGITIS PRESENT: ICD-10-CM

## 2021-11-15 DIAGNOSIS — R19.7 DIARRHEA, UNSPECIFIED TYPE: ICD-10-CM

## 2021-11-15 DIAGNOSIS — Z80.0 FAMILY HISTORY OF COLON CANCER: ICD-10-CM

## 2021-11-15 LAB — B-HCG UR QL: NEGATIVE

## 2021-11-15 PROCEDURE — 45380 COLONOSCOPY AND BIOPSY: CPT | Performed by: INTERNAL MEDICINE

## 2021-11-15 PROCEDURE — 25010000002 PROPOFOL 10 MG/ML EMULSION: Performed by: NURSE ANESTHETIST, CERTIFIED REGISTERED

## 2021-11-15 PROCEDURE — 43239 EGD BIOPSY SINGLE/MULTIPLE: CPT | Performed by: INTERNAL MEDICINE

## 2021-11-15 PROCEDURE — 88305 TISSUE EXAM BY PATHOLOGIST: CPT | Performed by: INTERNAL MEDICINE

## 2021-11-15 PROCEDURE — 81025 URINE PREGNANCY TEST: CPT | Performed by: INTERNAL MEDICINE

## 2021-11-15 RX ORDER — SODIUM CHLORIDE, SODIUM LACTATE, POTASSIUM CHLORIDE, CALCIUM CHLORIDE 600; 310; 30; 20 MG/100ML; MG/100ML; MG/100ML; MG/100ML
30 INJECTION, SOLUTION INTRAVENOUS CONTINUOUS
Status: DISCONTINUED | OUTPATIENT
Start: 2021-11-15 | End: 2021-11-15 | Stop reason: HOSPADM

## 2021-11-15 RX ORDER — SUCRALFATE 1 G/1
1 TABLET ORAL 4 TIMES DAILY
Qty: 120 TABLET | Refills: 5 | Status: SHIPPED | OUTPATIENT
Start: 2021-11-15 | End: 2021-12-24

## 2021-11-15 RX ORDER — LIDOCAINE HYDROCHLORIDE 20 MG/ML
INJECTION, SOLUTION INFILTRATION; PERINEURAL AS NEEDED
Status: DISCONTINUED | OUTPATIENT
Start: 2021-11-15 | End: 2021-11-15 | Stop reason: SURG

## 2021-11-15 RX ADMIN — SODIUM CHLORIDE, POTASSIUM CHLORIDE, SODIUM LACTATE AND CALCIUM CHLORIDE: 600; 310; 30; 20 INJECTION, SOLUTION INTRAVENOUS at 12:27

## 2021-11-15 RX ADMIN — PROPOFOL 150 MCG/KG/MIN: 10 INJECTION, EMULSION INTRAVENOUS at 12:27

## 2021-11-15 RX ADMIN — LIDOCAINE HYDROCHLORIDE 50 MG: 20 INJECTION, SOLUTION INFILTRATION; PERINEURAL at 12:27

## 2021-11-15 RX ADMIN — SODIUM CHLORIDE, POTASSIUM CHLORIDE, SODIUM LACTATE AND CALCIUM CHLORIDE: 600; 310; 30; 20 INJECTION, SOLUTION INTRAVENOUS at 12:56

## 2021-11-15 NOTE — ANESTHESIA POSTPROCEDURE EVALUATION
Patient: Mima Wolfe    Procedure Summary     Date: 11/15/21 Room / Location: [facility] ENDOSCOPY 2 / [facility] ENDOSCOPY    Anesthesia Start: 1225 Anesthesia Stop: 1255    Procedures:       ESOPHAGOGASTRODUODENOSCOPY (N/A )      COLONOSCOPY with biopsy (N/A ) Diagnosis:       Family history of colon cancer      Gastroesophageal reflux disease, unspecified whether esophagitis present      Nausea      Diarrhea, unspecified type      Generalized abdominal pain      Abdominal bloating      (Family history of colon cancer [Z80.0])      (Gastroesophageal reflux disease, unspecified whether esophagitis present [K21.9])      (Nausea [R11.0])      (Diarrhea, unspecified type [R19.7])      (Generalized abdominal pain [R10.84])      (Abdominal bloating [R14.0])    Surgeons: Khari Carlson MD Provider: Robles Medina MD    Anesthesia Type: general ASA Status: 2          Anesthesia Type: general    Vitals  Vitals Value Taken Time   /71 11/15/21 1322   Temp 36.8 °C (98.2 °F) 11/15/21 1320   Pulse 69 11/15/21 1322   Resp 20 11/15/21 1320   SpO2 100 % 11/15/21 1322   Vitals shown include unvalidated device data.        Post Anesthesia Care and Evaluation    Patient location during evaluation: bedside  Patient participation: complete - patient participated  Level of consciousness: awake  Pain score: 0  Pain management: adequate  Airway patency: patent  Anesthetic complications: No anesthetic complications  PONV Status: none  Cardiovascular status: acceptable and stable  Respiratory status: acceptable and room air  Hydration status: acceptable    Comments: An Anesthesiologist personally participated in the most demanding procedures (including induction and emergence if applicable) in the anesthesia plan, monitored the course of anesthesia administration at frequent intervals and remained physically present and available for immediate diagnosis and treatment of emergencies.

## 2021-11-15 NOTE — ANESTHESIA PREPROCEDURE EVALUATION
Anesthesia Evaluation     Patient summary reviewed and Nursing notes reviewed   no history of anesthetic complications:  NPO Solid Status: > 8 hours  NPO Liquid Status: > 2 hours           Airway   Mallampati: I  TM distance: >3 FB  Neck ROM: full  No difficulty expected  Dental      Pulmonary - negative pulmonary ROS and normal exam    breath sounds clear to auscultation  Cardiovascular - negative cardio ROS and normal exam  Exercise tolerance: good (4-7 METS)    Rhythm: regular        Neuro/Psych- negative ROS    ROS Comment: corected Chiari  GI/Hepatic/Renal/Endo    (+) obesity,       Musculoskeletal     Abdominal    Substance History - negative use     OB/GYN          Other - negative ROS                       Anesthesia Plan    ASA 2     general   total IV anesthesia    Anesthetic plan, all risks, benefits, and alternatives have been provided, discussed and informed consent has been obtained with: patient.

## 2021-11-15 NOTE — H&P
"ScreeningPre Procedure History & Physical    Chief Complaint:   Family  H/o colon cancer  gerd  Nausea  bloating    Subjective     HPI:   As above    Past Medical History:   Past Medical History:   Diagnosis Date   • Chiari malformation type I (HCC)    • Cholelithiasis    • Migraine        Past Surgical History:  Past Surgical History:   Procedure Laterality Date   • BRAIN SURGERY     • CHOLECYSTECTOMY     • EYE SURGERY     • WISDOM TOOTH EXTRACTION         Family History:  Family History   Problem Relation Age of Onset   • Diabetes Father         borderline   • Colon cancer Maternal Aunt    • Colon cancer Maternal Uncle    • Malig Hyperthermia Neg Hx        Social History:   reports that she has never smoked. She has never used smokeless tobacco. She reports current alcohol use. She reports that she does not use drugs.    Medications:   Medications Prior to Admission   Medication Sig Dispense Refill Last Dose   • spironolactone (ALDACTONE) 50 MG tablet Take one tab p.o. QD for acne 90 tablet 0 11/14/2021 at 0800   • benzoyl peroxide 2.5 % gel APPLY THIN LAYER TOPICALLY TO THE AFFECTED AREA TWICE DAILY      • colestipol (Colestid) 1 g tablet Take 2 tablets by mouth 2 (Two) Times a Day for 30 days. 120 tablet 2    • famotidine (Pepcid) 20 MG tablet Take 1 tablet by mouth every night at bedtime. 30 tablet 2        Allergies:  Metoclopramide and Retin-a [tretinoin]        Objective     Blood pressure 118/75, pulse 77, temperature 97.9 °F (36.6 °C), temperature source Temporal, resp. rate 18, height 162.6 cm (64\"), weight 88.2 kg (194 lb 7.1 oz), last menstrual period 10/30/2021, SpO2 96 %.    Physical Exam   Constitutional: Pt is oriented to person, place, and time and well-developed, well-nourished, and in no distress.   Mouth/Throat: Oropharynx is clear and moist.   Neck: Normal range of motion.   Cardiovascular: Normal rate, regular rhythm and normal heart sounds.    Pulmonary/Chest: Effort normal and breath sounds " normal.   Abdominal: Soft. Nontender  Skin: Skin is warm and dry.   Psychiatric: Mood, memory, affect and judgment normal.     Assessment/Plan     Diagnosis:  Family  H/o colon cancer  gerd  Nausea  bloating       Anticipated Surgical Procedure:  colonoscopy  egd    The risks, benefits, and alternatives of this procedure have been discussed with the patient or the responsible party- the patient understands and agrees to proceed.

## 2021-11-16 LAB
CYTO UR: NORMAL
LAB AP CASE REPORT: NORMAL
LAB AP CLINICAL INFORMATION: NORMAL
PATH REPORT.FINAL DX SPEC: NORMAL
PATH REPORT.GROSS SPEC: NORMAL

## 2021-11-17 ENCOUNTER — APPOINTMENT (OUTPATIENT)
Dept: GENERAL RADIOLOGY | Facility: HOSPITAL | Age: 26
End: 2021-11-17

## 2021-11-17 ENCOUNTER — HOSPITAL ENCOUNTER (EMERGENCY)
Facility: HOSPITAL | Age: 26
Discharge: HOME | End: 2021-11-17
Attending: EMERGENCY MEDICINE | Admitting: EMERGENCY MEDICINE

## 2021-11-17 VITALS
HEART RATE: 61 BPM | BODY MASS INDEX: 33.5 KG/M2 | DIASTOLIC BLOOD PRESSURE: 82 MMHG | RESPIRATION RATE: 20 BRPM | TEMPERATURE: 97.9 F | SYSTOLIC BLOOD PRESSURE: 104 MMHG | OXYGEN SATURATION: 98 % | HEIGHT: 64 IN | WEIGHT: 196.21 LBS

## 2021-11-17 DIAGNOSIS — K21.9 GASTROESOPHAGEAL REFLUX DISEASE WITHOUT ESOPHAGITIS: Primary | ICD-10-CM

## 2021-11-17 LAB
ALBUMIN SERPL-MCNC: 4.3 G/DL (ref 3.5–5.2)
ALBUMIN/GLOB SERPL: 1.4 G/DL
ALP SERPL-CCNC: 68 U/L (ref 39–117)
ALT SERPL W P-5'-P-CCNC: 16 U/L (ref 1–33)
ANION GAP SERPL CALCULATED.3IONS-SCNC: 10 MMOL/L (ref 5–15)
AST SERPL-CCNC: 14 U/L (ref 1–32)
BILIRUB SERPL-MCNC: 0.2 MG/DL (ref 0–1.2)
BUN SERPL-MCNC: 13 MG/DL (ref 6–20)
BUN/CREAT SERPL: 15.5 (ref 7–25)
CALCIUM SPEC-SCNC: 9.4 MG/DL (ref 8.6–10.5)
CHLORIDE SERPL-SCNC: 104 MMOL/L (ref 98–107)
CK MB SERPL-CCNC: <1 NG/ML
CK SERPL-CCNC: 59 U/L (ref 20–180)
CO2 SERPL-SCNC: 25 MMOL/L (ref 22–29)
CREAT SERPL-MCNC: 0.84 MG/DL (ref 0.57–1)
GFR SERPL CREATININE-BSD FRML MDRD: 84 ML/MIN/1.73
GLOBULIN UR ELPH-MCNC: 3 GM/DL
GLUCOSE SERPL-MCNC: 81 MG/DL (ref 65–99)
HOLD SPECIMEN: NORMAL
HOLD SPECIMEN: NORMAL
LIPASE SERPL-CCNC: 33 U/L (ref 13–60)
MAGNESIUM SERPL-MCNC: 1.8 MG/DL (ref 1.6–2.6)
NT-PROBNP SERPL-MCNC: 30.7 PG/ML (ref 0–450)
POTASSIUM SERPL-SCNC: 3.9 MMOL/L (ref 3.5–5.2)
PROT SERPL-MCNC: 7.3 G/DL (ref 6–8.5)
QT INTERVAL: 385 MS
SODIUM SERPL-SCNC: 139 MMOL/L (ref 136–145)
TROPONIN I SERPL-MCNC: 0 NG/ML (ref 0–0.6)
WHOLE BLOOD HOLD SPECIMEN: NORMAL
WHOLE BLOOD HOLD SPECIMEN: NORMAL

## 2021-11-17 PROCEDURE — 93005 ELECTROCARDIOGRAM TRACING: CPT

## 2021-11-17 PROCEDURE — 93005 ELECTROCARDIOGRAM TRACING: CPT | Performed by: EMERGENCY MEDICINE

## 2021-11-17 PROCEDURE — 99283 EMERGENCY DEPT VISIT LOW MDM: CPT

## 2021-11-17 PROCEDURE — 80053 COMPREHEN METABOLIC PANEL: CPT | Performed by: EMERGENCY MEDICINE

## 2021-11-17 PROCEDURE — 83880 ASSAY OF NATRIURETIC PEPTIDE: CPT | Performed by: EMERGENCY MEDICINE

## 2021-11-17 PROCEDURE — 71045 X-RAY EXAM CHEST 1 VIEW: CPT

## 2021-11-17 PROCEDURE — 84484 ASSAY OF TROPONIN QUANT: CPT

## 2021-11-17 PROCEDURE — 82553 CREATINE MB FRACTION: CPT | Performed by: EMERGENCY MEDICINE

## 2021-11-17 PROCEDURE — 36415 COLL VENOUS BLD VENIPUNCTURE: CPT

## 2021-11-17 PROCEDURE — 82550 ASSAY OF CK (CPK): CPT | Performed by: EMERGENCY MEDICINE

## 2021-11-17 PROCEDURE — 83735 ASSAY OF MAGNESIUM: CPT | Performed by: EMERGENCY MEDICINE

## 2021-11-17 PROCEDURE — 83690 ASSAY OF LIPASE: CPT | Performed by: EMERGENCY MEDICINE

## 2021-11-17 PROCEDURE — 93010 ELECTROCARDIOGRAM REPORT: CPT | Performed by: INTERNAL MEDICINE

## 2021-11-17 RX ORDER — ALUMINA, MAGNESIA, AND SIMETHICONE 2400; 2400; 240 MG/30ML; MG/30ML; MG/30ML
15 SUSPENSION ORAL ONCE
Status: COMPLETED | OUTPATIENT
Start: 2021-11-17 | End: 2021-11-17

## 2021-11-17 RX ORDER — SODIUM CHLORIDE 0.9 % (FLUSH) 0.9 %
10 SYRINGE (ML) INJECTION AS NEEDED
Status: DISCONTINUED | OUTPATIENT
Start: 2021-11-17 | End: 2021-11-17 | Stop reason: HOSPADM

## 2021-11-17 RX ORDER — LIDOCAINE HYDROCHLORIDE 20 MG/ML
15 SOLUTION OROPHARYNGEAL ONCE
Status: COMPLETED | OUTPATIENT
Start: 2021-11-17 | End: 2021-11-17

## 2021-11-17 RX ORDER — ESOMEPRAZOLE MAGNESIUM 20 MG/1
20 FOR SUSPENSION ORAL
Qty: 20 EACH | Refills: 0 | Status: SHIPPED | OUTPATIENT
Start: 2021-11-17 | End: 2021-12-07

## 2021-11-17 RX ORDER — ASPIRIN 81 MG/1
324 TABLET, CHEWABLE ORAL ONCE
Status: DISCONTINUED | OUTPATIENT
Start: 2021-11-17 | End: 2021-11-17 | Stop reason: HOSPADM

## 2021-11-17 RX ADMIN — LIDOCAINE HYDROCHLORIDE 15 ML: 20 SOLUTION ORAL; TOPICAL at 09:47

## 2021-11-17 RX ADMIN — ALUMINUM HYDROXIDE, MAGNESIUM HYDROXIDE, AND DIMETHICONE 15 ML: 400; 400; 40 SUSPENSION ORAL at 09:46

## 2021-11-17 NOTE — ED PROVIDER NOTES
Subjective   Patient complaining of intermittent substernal chest discomfort that has been waxing and waning since her endoscopy 3 days ago.  Patient denies any additional symptoms and or concerns at this time.      History provided by:  Patient   used: No    Chest Pain  Pain location:  Substernal area  Pain quality: aching    Pain radiates to:  Does not radiate  Pain severity:  Moderate  Onset quality:  Sudden  Duration: About 3 days.  Timing:  Intermittent  Progression:  Waxing and waning  Chronicity:  New  Context: not breathing, not drug use, not eating, not intercourse, not lifting, not movement, not raising an arm, not at rest, not stress and not trauma    Context comment:  Patient complaining of intermittent substernal chest discomfort.  P  Associated symptoms: no abdominal pain, no cough, no fever, no headache, no nausea, no shortness of breath and no vomiting        Review of Systems   Constitutional: Negative for chills and fever.   HENT: Negative for congestion, ear pain and sore throat.    Eyes: Negative for pain.   Respiratory: Negative for cough, chest tightness and shortness of breath.    Cardiovascular: Positive for chest pain.        Patient complained of intermittent substernal chest discomfort for approximately 3 days.   Gastrointestinal: Negative for abdominal pain, diarrhea, nausea and vomiting.   Genitourinary: Negative for flank pain and hematuria.   Musculoskeletal: Negative for joint swelling.   Skin: Negative for pallor.   Neurological: Negative for seizures and headaches.   Psychiatric/Behavioral: Negative.    All other systems reviewed and are negative.      Past Medical History:   Diagnosis Date   • Chiari malformation type I (HCC)    • Cholelithiasis    • Migraine        Allergies   Allergen Reactions   • Metoclopramide Shortness Of Breath   • Retin-A [Tretinoin] Rash       Past Surgical History:   Procedure Laterality Date   • BRAIN SURGERY     • CHOLECYSTECTOMY      • COLONOSCOPY N/A 11/15/2021    Procedure: COLONOSCOPY with biopsy;  Surgeon: Khari Carlson MD;  Location: [facility] ENDOSCOPY;  Service: Gastroenterology;  Laterality: N/A;  normal colon   • ENDOSCOPY N/A 11/15/2021    Procedure: ESOPHAGOGASTRODUODENOSCOPY;  Surgeon: Khari Carlson MD;  Location: [facility] ENDOSCOPY;  Service: Gastroenterology;  Laterality: N/A;  normal EGD   • EYE SURGERY     • WISDOM TOOTH EXTRACTION         Family History   Problem Relation Age of Onset   • Diabetes Father         borderline   • Colon cancer Maternal Aunt    • Colon cancer Maternal Uncle    • Malig Hyperthermia Neg Hx        Social History     Socioeconomic History   • Marital status: Single   Tobacco Use   • Smoking status: Never Smoker   • Smokeless tobacco: Never Used   Vaping Use   • Vaping Use: Never used   Substance and Sexual Activity   • Alcohol use: Yes     Comment: CURRENT SOME DAY; OCCASIONALLY DRINKS LESS THAN 1 DRINK PER DAY, HAS BEEN DRINKIGN FOR 2 YEARS    • Drug use: Never   • Sexual activity: Not Currently     Partners: Male     Birth control/protection: None           Objective   Physical Exam  Vitals and nursing note reviewed.   Constitutional:       General: She is not in acute distress.     Appearance: Normal appearance. She is not ill-appearing, toxic-appearing or diaphoretic.   HENT:      Head: Normocephalic and atraumatic.      Mouth/Throat:      Mouth: Mucous membranes are moist.   Eyes:      General: No scleral icterus.     Pupils: Pupils are equal, round, and reactive to light.   Cardiovascular:      Rate and Rhythm: Normal rate and regular rhythm.      Pulses: Normal pulses.      Heart sounds: Normal heart sounds.   Pulmonary:      Effort: Pulmonary effort is normal. No tachypnea, accessory muscle usage or respiratory distress.      Breath sounds: Normal breath sounds. No stridor. No decreased breath sounds.   Abdominal:      General: Abdomen is flat.      Palpations: Abdomen is soft.       Tenderness: There is no abdominal tenderness.   Musculoskeletal:         General: Normal range of motion.      Cervical back: Normal range of motion and neck supple.   Skin:     General: Skin is warm and dry.   Neurological:      General: No focal deficit present.      Mental Status: She is alert. Mental status is at baseline. She is disoriented.   Psychiatric:         Mood and Affect: Mood normal.         Behavior: Behavior normal.         Procedures           ED Course                                         HEART Score (for prediction of 6-week risk of major adverse cardiac event) reviewed and/or performed as part of the patient evaluation and treatment planning process.  The result associated with this review/performance is: 0    PERC Rule (for pulmonary embolism) reviewed and/or performed as part of the patient evaluation and treatment planning process.  The result associated with this review/performance is: 0       MDM  Number of Diagnoses or Management Options  Diagnosis management comments: I have spoken with Ms. Wolfe. I have explained the patient´s condition, diagnoses and treatment plan based on the information available to me at this time. I have answered the patient questions and addressed any concerns. The patient has a good  understanding of the patient´s diagnosis, condition, and treatment plan as can be expected at this point. The vital signs have been stable. The patient´s condition is stable and appropriate for discharge from the emergency department.      The patient will pursue further outpatient evaluation with the primary care physician or other designated or consulting physician as outlined in the discharge instructions. They are agreeable to this plan of care and follow-up instructions have been explained in detail. The patient has received these instructions in written format and have expressed an understanding of the discharge instructions. The patient is aware that any significant  change in condition or worsening of symptoms should prompt an immediate return to this or the closest emergency department or call to 911.       Amount and/or Complexity of Data Reviewed  Clinical lab tests: reviewed  Tests in the radiology section of CPT®: reviewed  Tests in the medicine section of CPT®: reviewed    Risk of Complications, Morbidity, and/or Mortality  Presenting problems: moderate  Diagnostic procedures: low  Management options: low    Patient Progress  Patient progress: stable      Final diagnoses:   Gastroesophageal reflux disease without esophagitis       ED Disposition  ED Disposition     ED Disposition Condition Comment    Discharge Stable           Wilbur Avendaño Jr., MD  [address] [facility] [id number] [phone]    In 3 days      Khari Carlson MD  [address] [place] [id number] [phone]    Call today  Follow-up as scheduled.         Medication List      New Prescriptions    esomeprazole 20 MG packet  Commonly known as: NexIUM  Take 20 mg by mouth Every Morning Before Breakfast for 20 days.           Where to Get Your Medications      These medications were sent to [id number] - [place], [place] - [address] [place] [address] AT [facility] - [phone]  - [phone] [address] [address]    Phone: [phone]   · esomeprazole 20 MG packet          Saeed Singleton APRN  11/17/21 1120

## 2021-11-19 ENCOUNTER — TELEPHONE (OUTPATIENT)
Dept: GASTROENTEROLOGY | Facility: CLINIC | Age: 26
End: 2021-11-19

## 2021-11-22 ENCOUNTER — TELEPHONE (OUTPATIENT)
Dept: GASTROENTEROLOGY | Facility: CLINIC | Age: 26
End: 2021-11-22

## 2021-11-23 ENCOUNTER — TELEPHONE (OUTPATIENT)
Dept: GASTROENTEROLOGY | Facility: CLINIC | Age: 26
End: 2021-11-23

## 2021-11-30 PROCEDURE — 87635 SARS-COV-2 COVID-19 AMP PRB: CPT | Performed by: NURSE PRACTITIONER

## 2021-12-01 ENCOUNTER — TELEPHONE (OUTPATIENT)
Dept: URGENT CARE | Facility: CLINIC | Age: 26
End: 2021-12-01

## 2021-12-01 NOTE — TELEPHONE ENCOUNTER
----- Message from ANDREA Reddy sent at 11/30/2021  6:59 PM EST -----  Please call the patient regarding her negative result.

## 2021-12-20 DIAGNOSIS — B00.9 HSV (HERPES SIMPLEX VIRUS) INFECTION: Primary | ICD-10-CM

## 2021-12-20 RX ORDER — VALACYCLOVIR HYDROCHLORIDE 500 MG/1
500 TABLET, FILM COATED ORAL 2 TIMES DAILY
Qty: 6 TABLET | Refills: 1 | Status: SHIPPED | OUTPATIENT
Start: 2021-12-20

## 2022-01-26 NOTE — PROGRESS NOTES
Chief Complaint   EGD and colonoscopy follow up    History of Present Illness   You have chosen to receive care through a telephone visit. Do you consent to use a telephone visit for your medical care today? Yes    Informed patient that as visit is being performed as a telehealth visit there will be no opportunity to obtain vital signs or perform physical exam.  Due to this there is unfortunately a possibility that things may be missed that would typically be noticed during a traditionally visit.  Patient is aware of this possibility and agrees to proceed with telehealth.  Patient states there is no other person present for this phone call.     Mima Wolfe is a 26 y.o. female who presents to [facility] for follow-up after recent endoscopy.  We reviewed endoscopy and pathology at this time.  Patient reports that she was on colestipol 2 g twice daily but the tablet was too large to swallow so she discontinued the medication.  She reports when she was taking it that she did feel that her bowel movements improved slightly.  She is taking Pepcid daily for her nausea and reflux and this controls her symptoms.  Patient reports bowel movements are currently 4-5 times a day postprandially.  Patient denies fever, nausea, vomiting, weight loss, night sweats, melena, hematochezia, hematemesis.    Endoscopy: Review of the patient's most recent EGD and colonoscopy performed by Dr. Carlson on 11/15/2021 normal colon, normal EGD, normal pathology.      Results       Result Review :       CMP    CMP 11/17/21   Glucose 81   BUN 13   Creatinine 0.84   eGFR Non African Am 84   Sodium 139   Potassium 3.9   Chloride 104   Calcium 9.4   Albumin 4.30   Total Bilirubin 0.2   Alkaline Phosphatase 68   AST (SGOT) 14   ALT (SGPT) 16                         Past Medical History       Past Medical History:   Diagnosis Date   • Chiari malformation type I (HCC)    • Cholelithiasis    • Migraine        Past  Refill approved as requested.   Surgical History:   Procedure Laterality Date   • BRAIN SURGERY     • CHOLECYSTECTOMY     • COLONOSCOPY N/A 11/15/2021    Procedure: COLONOSCOPY with biopsy;  Surgeon: Khari Carlson MD;  Location: [facility] ENDOSCOPY;  Service: Gastroenterology;  Laterality: N/A;  normal colon   • ENDOSCOPY N/A 11/15/2021    Procedure: ESOPHAGOGASTRODUODENOSCOPY;  Surgeon: Khari Carlson MD;  Location: [facility] ENDOSCOPY;  Service: Gastroenterology;  Laterality: N/A;  normal EGD   • EYE SURGERY     • UPPER GASTROINTESTINAL ENDOSCOPY     • WISDOM TOOTH EXTRACTION           Current Outpatient Medications:   •  minocycline (MINOCIN,DYNACIN) 100 MG capsule, Take 1 capsule by mouth once daily for acne., Disp: 30 capsule, Rfl: 3  •  spironolactone (ALDACTONE) 100 MG tablet, Take 1 tablet by mouth once daily, Disp: 30 tablet, Rfl: 6  •  Trifarotene (Aklief) 0.005 % cream, Apply small pea sized amount to face at bedtime, Disp: 45 g, Rfl: 11  •  valACYclovir (Valtrex) 500 MG tablet, Take 1 tablet by mouth 2 (Two) Times a Day., Disp: 6 tablet, Rfl: 1  •  colestipol (COLESTID) 5 g granules, Take 5 g by mouth 2 (Two) Times a Day., Disp: 60 each, Rfl: 6  •  famotidine (Pepcid) 20 MG tablet, Take 1 tablet by mouth every night at bedtime., Disp: 30 tablet, Rfl: 2     Allergies   Allergen Reactions   • Metoclopramide Shortness Of Breath   • Retin-A [Tretinoin] Rash       Family History   Problem Relation Age of Onset   • Diabetes Father         borderline   • Colon cancer Maternal Aunt    • Colon cancer Maternal Uncle    • Malig Hyperthermia Neg Hx         Social History     Social History Narrative   • Not on file       Objective       Vital Signs:   There were no vitals taken for this visit.  Telehealth visit    Physical Exam  Gen: well-nourished, no acute distress  HENT: atraumatic, normocephalic  Eyes: extraocular movements intact, no scleral icterus  Lung: breathing comfortably, no cough  Skin: no visible rash, no lesions  Neuro:  grossly oriented to person, place, and time. no facial droop   Psych: normal mood and affect      Assessment & Plan          Assessment and Plan    Diagnoses and all orders for this visit:    1. Family history of colon cancer (Primary)    2. Gastroesophageal reflux disease, unspecified whether esophagitis present    3. Diarrhea, unspecified type    4. Abdominal bloating    Other orders  -     colestipol (COLESTID) 5 g granules; Take 5 g by mouth 2 (Two) Times a Day.  Dispense: 60 each; Refill: 6  -     famotidine (Pepcid) 20 MG tablet; Take 1 tablet by mouth every night at bedtime.  Dispense: 30 tablet; Refill: 2      26-year-old female presenting the office today with history of reflux, diarrhea, history of cholecystectomy, abdominal bloating and family history of colon cancer.  We reviewed recent endoscopy and pathology.  I have refilled the patient's colestipol in granular form as the patient could not tolerate large pills and appear to be working well for her diarrhea.  I have refilled the patient's Pepcid as this was working well for her reflux.  Patient will follow up in 6 months.  Patient agreeable to this plan will call with any questions or concerns.          Follow Up       Follow Up   Return in about 6 months (around 7/28/2022).  Patient was given instructions and counseling regarding her condition or for health maintenance advice. Please see specific information pulled into the AVS if appropriate.

## 2022-01-28 ENCOUNTER — TELEMEDICINE (OUTPATIENT)
Dept: GASTROENTEROLOGY | Facility: CLINIC | Age: 27
End: 2022-01-28

## 2022-01-28 DIAGNOSIS — R14.0 ABDOMINAL BLOATING: ICD-10-CM

## 2022-01-28 DIAGNOSIS — R19.7 DIARRHEA, UNSPECIFIED TYPE: ICD-10-CM

## 2022-01-28 DIAGNOSIS — K21.9 GASTROESOPHAGEAL REFLUX DISEASE, UNSPECIFIED WHETHER ESOPHAGITIS PRESENT: ICD-10-CM

## 2022-01-28 DIAGNOSIS — Z80.0 FAMILY HISTORY OF COLON CANCER: Primary | ICD-10-CM

## 2022-01-28 PROCEDURE — 99213 OFFICE O/P EST LOW 20 MIN: CPT | Performed by: NURSE PRACTITIONER

## 2022-01-28 RX ORDER — COLESTIPOL HYDROCHLORIDE 5 G/5G
5 GRANULE, FOR SUSPENSION ORAL 2 TIMES DAILY
Qty: 60 EACH | Refills: 0 | Status: SHIPPED | OUTPATIENT
Start: 2022-01-28 | End: 2022-06-27

## 2022-01-28 RX ORDER — FAMOTIDINE 20 MG/1
20 TABLET, FILM COATED ORAL
Qty: 30 TABLET | Refills: 2 | Status: SHIPPED | OUTPATIENT
Start: 2022-01-28 | End: 2022-06-27

## 2022-01-28 NOTE — PATIENT INSTRUCTIONS

## 2022-01-31 DIAGNOSIS — N76.0 BV (BACTERIAL VAGINOSIS): Primary | ICD-10-CM

## 2022-01-31 DIAGNOSIS — B96.89 BV (BACTERIAL VAGINOSIS): Primary | ICD-10-CM

## 2022-01-31 DIAGNOSIS — N93.9 ABNORMAL UTERINE BLEEDING (AUB): Primary | ICD-10-CM

## 2022-01-31 DIAGNOSIS — N76.0 ACUTE VAGINITIS: Primary | ICD-10-CM

## 2022-01-31 RX ORDER — METRONIDAZOLE 500 MG/1
500 TABLET ORAL 2 TIMES DAILY
Qty: 14 TABLET | Refills: 0 | Status: SHIPPED | OUTPATIENT
Start: 2022-01-31 | End: 2022-01-31 | Stop reason: SDUPTHER

## 2022-01-31 RX ORDER — FLUCONAZOLE 150 MG/1
150 TABLET ORAL
Qty: 2 TABLET | Refills: 0 | Status: SHIPPED | OUTPATIENT
Start: 2022-01-31 | End: 2022-02-06

## 2022-01-31 RX ORDER — METRONIDAZOLE 7.5 MG/G
GEL VAGINAL DAILY
Qty: 70 G | Refills: 0 | Status: SHIPPED | OUTPATIENT
Start: 2022-01-31 | End: 2022-02-07

## 2022-02-01 ENCOUNTER — LAB (OUTPATIENT)
Dept: OBSTETRICS AND GYNECOLOGY | Facility: CLINIC | Age: 27
End: 2022-02-01

## 2022-02-01 DIAGNOSIS — N93.9 ABNORMAL UTERINE BLEEDING (AUB): ICD-10-CM

## 2022-02-01 LAB
GLUCOSE P FAST SERPL-MCNC: 86 MG/DL (ref 74–106)
PROLACTIN SERPL-MCNC: 23 NG/ML (ref 4.79–23.3)
T4 FREE SERPL-MCNC: 1.3 NG/DL (ref 0.93–1.7)
TSH SERPL DL<=0.05 MIU/L-ACNC: 2.35 UIU/ML (ref 0.27–4.2)

## 2022-02-01 PROCEDURE — 84439 ASSAY OF FREE THYROXINE: CPT | Performed by: OBSTETRICS & GYNECOLOGY

## 2022-02-01 PROCEDURE — 83525 ASSAY OF INSULIN: CPT | Performed by: OBSTETRICS & GYNECOLOGY

## 2022-02-01 PROCEDURE — 82627 DEHYDROEPIANDROSTERONE: CPT | Performed by: OBSTETRICS & GYNECOLOGY

## 2022-02-01 PROCEDURE — 84410 TESTOSTERONE BIOAVAILABLE: CPT | Performed by: OBSTETRICS & GYNECOLOGY

## 2022-02-01 PROCEDURE — 82947 ASSAY GLUCOSE BLOOD QUANT: CPT | Performed by: OBSTETRICS & GYNECOLOGY

## 2022-02-01 PROCEDURE — 83498 ASY HYDROXYPROGESTERONE 17-D: CPT | Performed by: OBSTETRICS & GYNECOLOGY

## 2022-02-01 PROCEDURE — 84443 ASSAY THYROID STIM HORMONE: CPT | Performed by: OBSTETRICS & GYNECOLOGY

## 2022-02-01 PROCEDURE — 84146 ASSAY OF PROLACTIN: CPT | Performed by: OBSTETRICS & GYNECOLOGY

## 2022-02-02 LAB
DHEA-S SERPL-MCNC: 333 UG/DL (ref 110–431.7)
INSULIN SERPL-ACNC: 11.1 UIU/ML (ref 2.6–24.9)

## 2022-02-07 LAB — 17OHP SERPL-MCNC: 39 NG/DL

## 2022-02-12 LAB
TESTOST SERPL-MCNC: 30 NG/DL
TESTOSTERONE.FREE+WB MFR SERPL: 33.4 %
TESTOSTERONE.FREE+WB SERPL-MCNC: 10 NG/DL

## 2022-03-07 ENCOUNTER — LAB (OUTPATIENT)
Dept: OBSTETRICS AND GYNECOLOGY | Facility: CLINIC | Age: 27
End: 2022-03-07

## 2022-03-07 DIAGNOSIS — R30.0 DYSURIA: Primary | ICD-10-CM

## 2022-03-07 PROCEDURE — 87186 SC STD MICRODIL/AGAR DIL: CPT | Performed by: OBSTETRICS & GYNECOLOGY

## 2022-03-07 PROCEDURE — 87077 CULTURE AEROBIC IDENTIFY: CPT | Performed by: OBSTETRICS & GYNECOLOGY

## 2022-03-07 PROCEDURE — 87086 URINE CULTURE/COLONY COUNT: CPT | Performed by: OBSTETRICS & GYNECOLOGY

## 2022-03-07 RX ORDER — NITROFURANTOIN 25; 75 MG/1; MG/1
100 CAPSULE ORAL 2 TIMES DAILY
Qty: 14 CAPSULE | Refills: 0 | Status: SHIPPED | OUTPATIENT
Start: 2022-03-07 | End: 2022-06-27

## 2022-03-09 LAB — BACTERIA SPEC AEROBE CULT: ABNORMAL

## 2022-04-15 ENCOUNTER — TREATMENT (OUTPATIENT)
Dept: PHYSICAL THERAPY | Facility: CLINIC | Age: 27
End: 2022-04-15

## 2022-04-15 DIAGNOSIS — M70.62 TROCHANTERIC BURSITIS OF BOTH HIPS: Primary | ICD-10-CM

## 2022-04-15 DIAGNOSIS — M70.61 TROCHANTERIC BURSITIS OF BOTH HIPS: Primary | ICD-10-CM

## 2022-04-15 DIAGNOSIS — R29.898 WEAKNESS OF BOTH HIPS: ICD-10-CM

## 2022-04-15 DIAGNOSIS — M25.559 PAIN, HIP: ICD-10-CM

## 2022-04-15 PROCEDURE — 97161 PT EVAL LOW COMPLEX 20 MIN: CPT | Performed by: PHYSICAL THERAPIST

## 2022-04-15 NOTE — PROGRESS NOTES
Physical Therapy Initial Evaluation and Plan of Care    Patient: Mima Wolfe   : 1995  Diagnosis/ICD-10 Code:  Pain, hip [M25.559]  Referring practitioner: ANDREA Oliva  Date of Initial Visit: 4/15/2022  Today's Date: 4/15/2022  Patient seen for 1 sessions           Subjective Questionnaire: LEFS: 41.25%      Subjective Evaluation    History of Present Illness  Mechanism of injury: Pt reports that she has had bilateral hip pain constantly for over a year. Pt reports no YURI. Pt reports that she has difficulty sleeping on her side. Pt states that she has had no imaging of her hip at this time. Pt reports her hips pops constantly. Pt reports she walks all the time for her job and hips hurts the most at the end of the day. Pt also reports she has had some low back pain in the past month. Pt reports she also had a brain surgery in 2017 for a chiari malformation.                   Quality of life: good    Pain  Current pain rating: 3  At best pain rating: 3  At worst pain ratin  Quality: sharp and dull ache  Relieving factors: rest  Aggravating factors: standing and ambulation    Diagnostic Tests  No diagnostic tests performed    Treatments  No previous or current treatments  Patient Goals  Patient goals for therapy: decreased pain             Objective          Palpation   Left   Tenderness of the lumbar paraspinals, piriformis and TFL.     Right Tenderness of the lumbar paraspinals, piriformis and TFL.     Tenderness     Left Hip   Tenderness in the greater trochanter.     Right Hip   Tenderness in the greater trochanter.     Lumbar Screen  Lumbar range of motion within normal limits with the following exceptions:Pt's lumbar ROM is WFL. Pt does have increase in pain with PA's to lumbar spine with referral up her spine with PA's to T12-L2.    Neurological Testing     Sensation     Hip   Left Hip   Intact: light touch    Right Hip   Intact: light touch    Active Range of Motion   Left Hip    Flexion: WFL  Extension: WFL  Abduction: WFL  Adduction: WFL  External rotation (90/90): WFL  Internal rotation (90/90): WFL    Right Hip   Flexion: WFL  Extension: WFL  Abduction: WFL  Adduction: WFL  External rotation (90/90): WFL  Internal rotation (90/90): WFL    Joint Play   Left Hip   Joints within functional limits are the posterior hip capsule.     Right Hip   Joints within functional limits are the posterior hip capsule.     Strength/Myotome Testing     Left Hip   Planes of Motion   Flexion: 5  Extension: 4+  Abduction: 4+  Adduction: 4+  External rotation: 4+  Internal rotation: 4+    Right Hip   Planes of Motion   Flexion: 5  Extension: 4+  Abduction: 4+  Adduction: 4+  External rotation: 4+  Internal rotation: 4+    Tests     Left Hip   Negative BACILIO, FADIR, Saurabh and scour.     Right Hip   Negative BACILIO, FADIR, Saurabh and scour.     Left Hip Flexibility Comments:   Pt has increase in tightness in IT band, piriformis, hamstring, and quadriceps.    Right Hip Flexibility Comments:   Pt has increase in tightness in IT band, piriformis, hamstring, and quadriceps.        See Exercise, Manual, and Modality Logs for complete treatment.       Assessment & Plan     Assessment  Impairments: activity intolerance, impaired physical strength, lacks appropriate home exercise program, pain with function and weight-bearing intolerance  Functional Limitations: lifting, walking, uncomfortable because of pain and standing  Assessment details: Pt presents to PT with bilateral hip pain with signs and symptoms consistent with bilateral hip bursitis. Pt has slight weakness in bilateral hips but patient has significant tightness in all hip musculature. Will address patient's deficits in order to decrease patient's pain and return patient back to maximum function. Initiated patient's HEP to consist of IT band, hamstring and piriformis stretch and patient tolerated well.  Prognosis: good    Goals  Plan Goals: HIP PROBLEMS:    1.  The patient complains of bilateral hip pain.    LTG 1: 12 weeks: The patient will report a pain rating of 1/10 or better in order to improve tolerance to activities of daily living and improve sleep quality.    STATUS: New    STG 1a: 6 weeks: The patient will report a pain rating of 3/10 or better.    STATUS: New    2. The patient demonstrates weakness of the bilateral hip.    LTG 2: 12 weeks: The patient will demonstrate 5/5 strength for bilateral hip flexion, abduction, and extension in order to improve hip stability.    STATUS: New    STG 3a: The patient will be independent in HEP.    STATUS: New    Mobility: Walking/Moving Around Functional Limitation    LTG 4: 12 weeks: The patient will demonstrate 20% limitation on the Lower Extremity Functional Scale.    STATUS: New    STG 4a: 6 weeks: The patient will demonstrate 30% limitation on the Lower Extremity Functional Scale.    STATUS: New    Plan  Therapy options: will be seen for skilled therapy services  Planned modality interventions: cryotherapy, TENS and thermotherapy (hydrocollator packs)  Planned therapy interventions: manual therapy, neuromuscular re-education, ADL retraining, balance/weight-bearing training, flexibility, functional ROM exercises, gait training, home exercise program, joint mobilization, soft tissue mobilization, strengthening, stretching, therapeutic activities, abdominal trunk stabilization and spinal/joint mobilization  Frequency: 3x week  Duration in weeks: 12  Treatment plan discussed with: patient        History # of Personal Factors and/or Comorbidities: LOW (0)  Examination of Body System(s): # of elements: LOW (1-2)  Clinical Presentation: STABLE   Clinical Decision Making: LOW       Timed:         Manual Therapy:    0     mins  14105;     Therapeutic Exercise:    0     mins  99236;     Neuromuscular Harris:    0    mins  66263;    Therapeutic Activity:     0     mins  70485;     Gait Trainin     mins  59331;      Ultrasound:     0     mins  45289;    Ionto                               0    mins   37538  Self pay                         0     mins PTSPMIN2    Un-Timed:  Electrical Stimulation:    0     mins  98562 (MC )  Traction     0     mins 54761  Low Eval     45     Mins  54172  Mod Eval     0     Mins  55142  High Eval                       0     Mins  12665  Self Pay Eval                 0     PTSP1   Re-Eval                           0    mins  29704        Timed Treatment:   0   mins   Total Treatment:     45   mins    PT SIGNATURE: Electronically signed by Susanne Manley, KIRSTEN  [place] LICENSE: [id number]    DATE TREATMENT INITIATED: 4/15/2022    Initial Certification  Certification Period: 4/15/2022 thru 7/13/2022  I certify that the therapy services are furnished while this patient is under my care.  The services outlined above are required by this patient, and will be reviewed every 90 days.     PHYSICIAN: Bonnie Jarrett APRN   NPI: [id number]      DATE:     Please sign and return via fax to [phone] Thank you, [facility].     full weight-bearing

## 2022-04-18 ENCOUNTER — TREATMENT (OUTPATIENT)
Dept: PHYSICAL THERAPY | Facility: CLINIC | Age: 27
End: 2022-04-18

## 2022-04-18 DIAGNOSIS — M25.559 PAIN, HIP: Primary | ICD-10-CM

## 2022-04-18 PROCEDURE — 97530 THERAPEUTIC ACTIVITIES: CPT | Performed by: PHYSICAL THERAPIST

## 2022-04-18 PROCEDURE — 97110 THERAPEUTIC EXERCISES: CPT | Performed by: PHYSICAL THERAPIST

## 2022-04-18 NOTE — PROGRESS NOTES
Physical Therapy Daily Treatment Note      Patient: Mima Wolfe   : 1995  Referring practitioner: ANDREA Oliva  Date of Initial Visit: Type: THERAPY  Noted: 4/15/2022  Today's Date: 2022  Patient seen for 2 sessions           Subjective Questionnaire:       Subjective Evaluation    History of Present Illness    Subjective comment: Pt reported no hip pain currently.  Pt reported working hard on Friday and  her B hips were really hurting.  Pt reported that she performed HEP stretches and that helped relieve her pain.       Objective   See Exercise, Manual, and Modality Logs for complete treatment.       Assessment & Plan     Assessment    Assessment details: Pt reports pain with increased daily activity.  Pt she felt side stepping with blue loop more than anything and tolerated tx well.        Visit Diagnoses:    ICD-10-CM ICD-9-CM   1. Pain, hip  M25.559 719.45       Progress per Plan of Care and Progress strengthening /stabilization /functional activity           Timed:  Manual Therapy:         mins  77798;  Therapeutic Exercise:    19     mins  98258;     Neuromuscular Harris:        mins  37147;    Therapeutic Activity:     8     mins  57121;     Gait Training:           mins  78209;     Ultrasound:          mins  03105;    Electrical Stimulation:         mins  43269 ( );  Aquatic Therapy          mins  54252    Untimed:  Electrical Stimulation:         mins  71751 ( );  Mechanical Traction:         mins  13072;     Timed Treatment:   27   mins   Total Treatment:     27   mins    Electronically signed    Simin Armenta PTA  Physical Therapist Assistant    [place] license: [id number]

## 2022-04-25 ENCOUNTER — OFFICE VISIT (OUTPATIENT)
Dept: OBSTETRICS AND GYNECOLOGY | Facility: CLINIC | Age: 27
End: 2022-04-25

## 2022-04-25 VITALS
SYSTOLIC BLOOD PRESSURE: 122 MMHG | HEART RATE: 79 BPM | BODY MASS INDEX: 33.3 KG/M2 | DIASTOLIC BLOOD PRESSURE: 76 MMHG | WEIGHT: 194 LBS

## 2022-04-25 DIAGNOSIS — N89.8 LEUKORRHEA: Primary | ICD-10-CM

## 2022-04-25 DIAGNOSIS — Z70.8 ENCOUNTER FOR SEXUALLY TRANSMITTED DISEASE COUNSELING: ICD-10-CM

## 2022-04-25 DIAGNOSIS — R11.0 NAUSEA: Primary | ICD-10-CM

## 2022-04-25 DIAGNOSIS — E28.2 PCOS (POLYCYSTIC OVARIAN SYNDROME): ICD-10-CM

## 2022-04-25 DIAGNOSIS — Z80.9 FAMILY HISTORY OF CANCER: ICD-10-CM

## 2022-04-25 LAB
C TRACH RRNA CVX QL NAA+PROBE: NOT DETECTED
CANDIDA SPECIES: NEGATIVE
GARDNERELLA VAGINALIS: NEGATIVE
N GONORRHOEA RRNA SPEC QL NAA+PROBE: NOT DETECTED
T VAGINALIS DNA VAG QL PROBE+SIG AMP: NEGATIVE

## 2022-04-25 PROCEDURE — 99213 OFFICE O/P EST LOW 20 MIN: CPT | Performed by: OBSTETRICS & GYNECOLOGY

## 2022-04-25 PROCEDURE — 87491 CHLMYD TRACH DNA AMP PROBE: CPT | Performed by: OBSTETRICS & GYNECOLOGY

## 2022-04-25 PROCEDURE — 87510 GARDNER VAG DNA DIR PROBE: CPT | Performed by: OBSTETRICS & GYNECOLOGY

## 2022-04-25 PROCEDURE — 87591 N.GONORRHOEAE DNA AMP PROB: CPT | Performed by: OBSTETRICS & GYNECOLOGY

## 2022-04-25 PROCEDURE — 87480 CANDIDA DNA DIR PROBE: CPT | Performed by: OBSTETRICS & GYNECOLOGY

## 2022-04-25 PROCEDURE — 87660 TRICHOMONAS VAGIN DIR PROBE: CPT | Performed by: OBSTETRICS & GYNECOLOGY

## 2022-04-25 RX ORDER — ONDANSETRON 4 MG/1
4 TABLET, FILM COATED ORAL EVERY 8 HOURS PRN
Qty: 90 TABLET | Refills: 2 | Status: SHIPPED | OUTPATIENT
Start: 2022-04-25 | End: 2022-05-25

## 2022-04-25 NOTE — PROGRESS NOTES
GYN Visit    Chief Complaint   Patient presents with   • Vaginitis       HPI:   26 y.o. Contraception:  Vasectomy     Occas vag DC, occas odor.  MetroGel, no change.  Wants STD check.      Questions re HSV labs.  Acne difficult to control, Derm considering Accutane.    Menses are irreg, sometimes 2x/mo and others q 40+ days.  PCOS labs w Stephanie were neg.  US w PCOish ovaries by report.    History: PMHx, Meds, Allergies, PSHx, Social Hx, and POBHx all reviewed and updated.    PHYSICAL EXAM:  /76   Pulse 79   Wt 88 kg (194 lb)   LMP 2022 (Exact Date)   BMI 33.30 kg/m²   General- NAD, alert and oriented, appropriate  Psych- Normal mood, good memory    External genitalia- Normal female, no lesions  Urethra/meatus- Normal, no masses, non tender, no prolapse  Bladder- Normal, no masses, non tender, no prolapse  Vagina- Normal, no atrophy, no lesions, no discharge, no prolapse  Cvx- Normal, no lesions, no discharge, No cervical motion tenderness    Ext- No edema, no cyanosis    Skin- No lesions, no rashes, no acanthosis nigricans, cystic acne jaw line      ASSESSMENT AND PLAN:  Diagnoses and all orders for this visit:    1. Leukorrhea (Primary)  -     Gardnerella vaginalis, Trichomonas vaginalis, Candida albicans, DNA - Swab, Vagina    2. Encounter for sexually transmitted disease counseling  -     Chlamydia trachomatis, Neisseria gonorrhoeae, PCR - Swab, Cervix    3. Family history of cancer    4. PCOS (polycystic ovarian syndrome)  Overview:  By US polycystic ovaries and chronic anovulation      PCOS- Dx, Tx, weight, pregnancy, periods/anovulation, cholesterol, insulin, and uterine cancer risk discussed w pt.     Considering Myriad, will check w insurance      Follow Up:  Return in about 2 months (around 2022) for WWE.          Indira Ruiz, DO  2022    [facility]  [address] [place] [address]  Dept: [phone]  Dept Fax:  [phone]  Loc: [phone]  Loc Fax: [phone]

## 2022-04-28 ENCOUNTER — TREATMENT (OUTPATIENT)
Dept: PHYSICAL THERAPY | Facility: CLINIC | Age: 27
End: 2022-04-28

## 2022-04-28 DIAGNOSIS — M70.62 TROCHANTERIC BURSITIS OF BOTH HIPS: ICD-10-CM

## 2022-04-28 DIAGNOSIS — M25.559 PAIN, HIP: Primary | ICD-10-CM

## 2022-04-28 DIAGNOSIS — R29.898 WEAKNESS OF BOTH HIPS: ICD-10-CM

## 2022-04-28 DIAGNOSIS — M70.61 TROCHANTERIC BURSITIS OF BOTH HIPS: ICD-10-CM

## 2022-04-28 PROCEDURE — 97110 THERAPEUTIC EXERCISES: CPT | Performed by: PHYSICAL THERAPIST

## 2022-04-28 PROCEDURE — 97530 THERAPEUTIC ACTIVITIES: CPT | Performed by: PHYSICAL THERAPIST

## 2022-04-28 PROCEDURE — 97140 MANUAL THERAPY 1/> REGIONS: CPT | Performed by: PHYSICAL THERAPIST

## 2022-04-28 NOTE — PROGRESS NOTES
Physical Therapy Daily Treatment Note      Patient: Mima Wolfe   : 1995  Referring practitioner: ANDREA Oliva  Date of Initial Visit: Type: THERAPY  Noted: 4/15/2022  Today's Date: 2022  Patient seen for 3 sessions           Subjective Questionnaire:       Subjective Evaluation    History of Present Illness    Subjective comment: Pt reported that she had no pain yesterday during her work day and when she was settling down the L hip got very painful.       Objective   See Exercise, Manual, and Modality Logs for complete treatment.       Assessment & Plan     Assessment    Assessment details: Pt had increased pain starting on the recumbent bike and throughou therapeutic exercise to 5-6/10.  Pt reported having no pain after manual work.        Visit Diagnoses:    ICD-10-CM ICD-9-CM   1. Pain, hip  M25.559 719.45   2. Trochanteric bursitis of both hips  M70.61 726.5    M70.62    3. Weakness of both hips  R29.898 729.89       Progress per Plan of Care and Progress strengthening /stabilization /functional activity           Timed:  Manual Therapy:    8     mins  89639;  Therapeutic Exercise:    14     mins  68862;     Neuromuscular Harris:        mins  05742;    Therapeutic Activity:     8     mins  02184;     Gait Training:           mins  44040;     Ultrasound:          mins  83242;    Electrical Stimulation:         mins  20512 ( );  Aquatic Therapy          mins  16281    Untimed:  Electrical Stimulation:         mins  91592 ( );  Mechanical Traction:         mins  91644;     Timed Treatment:   30   mins   Total Treatment:     30   mins    Electronically signed    Simin Armenta PTA  Physical Therapist Assistant    [place] license: [id number]

## 2022-05-04 ENCOUNTER — TREATMENT (OUTPATIENT)
Dept: PHYSICAL THERAPY | Facility: CLINIC | Age: 27
End: 2022-05-04

## 2022-05-04 DIAGNOSIS — M70.62 TROCHANTERIC BURSITIS OF BOTH HIPS: ICD-10-CM

## 2022-05-04 DIAGNOSIS — M70.61 TROCHANTERIC BURSITIS OF BOTH HIPS: ICD-10-CM

## 2022-05-04 DIAGNOSIS — R29.898 WEAKNESS OF BOTH HIPS: ICD-10-CM

## 2022-05-04 DIAGNOSIS — M25.559 PAIN, HIP: Primary | ICD-10-CM

## 2022-05-04 PROCEDURE — 97110 THERAPEUTIC EXERCISES: CPT | Performed by: PHYSICAL THERAPIST

## 2022-05-04 PROCEDURE — 97530 THERAPEUTIC ACTIVITIES: CPT | Performed by: PHYSICAL THERAPIST

## 2022-05-04 PROCEDURE — 97140 MANUAL THERAPY 1/> REGIONS: CPT | Performed by: PHYSICAL THERAPIST

## 2022-05-05 NOTE — PROGRESS NOTES
"Physical Therapy Daily Treatment Note      Patient: Mima Wolfe   : 1995  Referring practitioner: ANDREA Oliva  Date of Initial Visit: Type: THERAPY  Noted: 4/15/2022  Today's Date: 2022  Patient seen for 4 sessions           Subjective  Mima Wolfe reports: L sciatic pain. No formal rating at initiation of care. Mima commented on absolute relief of sciatic pain after manual work, \"feel good\" as she left.       Objective   See Exercise, Manual, and Modality Logs for complete treatment.       Assessment/Plan  Mima noted how the postural changes aided her full body, transitioning to her neck. She informed PTA that she has surgery for a Chiari malformation. Mima does have forward head with significant tightness at anterior cervical musculature. Relief with session today. Continue per outlined POC.  Visit Diagnoses:    ICD-10-CM ICD-9-CM   1. Pain, hip  M25.559 719.45   2. Trochanteric bursitis of both hips  M70.61 726.5    M70.62    3. Weakness of both hips  R29.898 729.89       Progress per Plan of Care and Progress strengthening /stabilization /functional activity           Timed:  Manual Therapy:    23     mins  45061;  Therapeutic Exercise:    8     mins  10510;     Neuromuscular Harris:        mins  13566;    Therapeutic Activity:     8     mins  25199;     Gait Training:           mins  78917;     Ultrasound:          mins  16621;    Electrical Stimulation:         mins  76515 ( );  Aquatics  __   mins   30987    Untimed:  Electrical Stimulation:         mins  25705 ( );  Mechanical Traction:         mins  98980;     Timed Treatment:   39   mins   Total Treatment:     39   mins    Electronically Signed:  Telma Pedro PTA  Physical Therapist Assistant    [facility] license [id number]            "

## 2022-05-06 ENCOUNTER — TREATMENT (OUTPATIENT)
Dept: PHYSICAL THERAPY | Facility: CLINIC | Age: 27
End: 2022-05-06

## 2022-05-06 DIAGNOSIS — M25.559 PAIN, HIP: ICD-10-CM

## 2022-05-06 DIAGNOSIS — R29.898 WEAKNESS OF BOTH HIPS: ICD-10-CM

## 2022-05-06 DIAGNOSIS — M70.62 TROCHANTERIC BURSITIS OF BOTH HIPS: Primary | ICD-10-CM

## 2022-05-06 DIAGNOSIS — M70.61 TROCHANTERIC BURSITIS OF BOTH HIPS: Primary | ICD-10-CM

## 2022-05-06 PROCEDURE — 97140 MANUAL THERAPY 1/> REGIONS: CPT | Performed by: PHYSICAL THERAPIST

## 2022-05-06 PROCEDURE — 97110 THERAPEUTIC EXERCISES: CPT | Performed by: PHYSICAL THERAPIST

## 2022-05-06 NOTE — PROGRESS NOTES
Physical Therapy Daily Progress Note    VISIT#: 5    Subjective   Mima Wolfe reports 0/10 pain at start of session.       Objective     See Exercise, Manual, and Modality Logs for complete treatment.     Assessment/Plan    Pt's pain increased to a 6/10 with side stepping exercise today. Performed stretching and patient reported relief of pain. Will continue to progress patient's strengthening exercises as able.     Progress per Plan of Care and Progress strengthening /stabilization /functional activity            Timed:                 Manual Therapy:    12     mins  61271;     Therapeutic Exercise:    16     mins  04987;     Neuromuscular Harris:    0    mins  33695;    Therapeutic Activity:     0     mins  52332;     Gait Trainin     mins  19322;     Ultrasound:     0     mins  91077;    Ionto                               0    mins   98585  Self pay                         0     mins [email or web address]    Un-Timed:  Electrical Stimulation:    0     mins  39096 ( )  Canalith Repos    0     mins 51185  Dry Needling     0     mins self-pay  Traction     0     mins 04640    Timed Treatment:   28   mins   Total Treatment:     28   mins    Susanne Manley PT  Physical Therapist    PT SIGNATURE: Electronically signed by Susanne Manley PT  [place] LICENSE: [id number]

## 2022-05-09 ENCOUNTER — TREATMENT (OUTPATIENT)
Dept: PHYSICAL THERAPY | Facility: CLINIC | Age: 27
End: 2022-05-09

## 2022-05-09 DIAGNOSIS — R29.898 WEAKNESS OF BOTH HIPS: ICD-10-CM

## 2022-05-09 DIAGNOSIS — M70.62 TROCHANTERIC BURSITIS OF BOTH HIPS: Primary | ICD-10-CM

## 2022-05-09 DIAGNOSIS — M70.61 TROCHANTERIC BURSITIS OF BOTH HIPS: Primary | ICD-10-CM

## 2022-05-09 DIAGNOSIS — M25.559 PAIN, HIP: ICD-10-CM

## 2022-05-09 PROCEDURE — 97110 THERAPEUTIC EXERCISES: CPT | Performed by: PHYSICAL THERAPIST

## 2022-05-09 PROCEDURE — 97112 NEUROMUSCULAR REEDUCATION: CPT | Performed by: PHYSICAL THERAPIST

## 2022-05-09 NOTE — PROGRESS NOTES
Physical Therapy Daily Progress Note    VISIT#: 6    Subjective   Mima Wolfe reports 0/10 pain. Pt reports she wasn't sore after last therapy session.      Objective     See Exercise, Manual, and Modality Logs for complete treatment.     Assessment/Plan    Progressed patient's strengthening exercises today and patient tolerated well with no reports of increase in pain, but was challenged due to weakness in hips. Will continue to progress patient's exercises as able.    Progress per Plan of Care and Progress strengthening /stabilization /functional activity            Timed:                 Manual Therapy:    0     mins  95126;     Therapeutic Exercise:    16     mins  37568;     Neuromuscular Harris:    10    mins  51304;    Therapeutic Activity:     0     mins  35769;     Gait Trainin     mins  36973;     Ultrasound:     0     mins  55753;    Ionto                               0    mins   93080  Self pay                         0     mins [email or web address]    Un-Timed:  Electrical Stimulation:    0     mins  12575 ( )  Canalith Repos    0     mins 68769  Dry Needling     0     mins self-pay  Traction     0     mins 75383    Timed Treatment:   26   mins   Total Treatment:     26   mins    Susanne Manley PT  Physical Therapist    PT SIGNATURE: Electronically signed by Susanne Manley PT  [place] LICENSE: [id number]

## 2022-05-10 RX ORDER — DROSPIRENONE AND ETHINYL ESTRADIOL 0.02-3(28)
1 KIT ORAL DAILY
Qty: 84 TABLET | Refills: 3 | Status: SHIPPED | OUTPATIENT
Start: 2022-05-10 | End: 2022-06-27 | Stop reason: SDUPTHER

## 2022-05-25 ENCOUNTER — TELEPHONE (OUTPATIENT)
Dept: PHYSICAL THERAPY | Facility: CLINIC | Age: 27
End: 2022-05-25

## 2022-05-25 ENCOUNTER — OFFICE VISIT (OUTPATIENT)
Dept: OBSTETRICS AND GYNECOLOGY | Facility: CLINIC | Age: 27
End: 2022-05-25

## 2022-05-25 DIAGNOSIS — N76.0 ACUTE VAGINITIS: Primary | ICD-10-CM

## 2022-05-25 LAB
C TRACH RRNA CVX QL NAA+PROBE: NOT DETECTED
CANDIDA SPECIES: POSITIVE
GARDNERELLA VAGINALIS: NEGATIVE
N GONORRHOEA RRNA SPEC QL NAA+PROBE: NOT DETECTED
T VAGINALIS DNA VAG QL PROBE+SIG AMP: NEGATIVE

## 2022-05-25 PROCEDURE — 87591 N.GONORRHOEAE DNA AMP PROB: CPT | Performed by: OBSTETRICS & GYNECOLOGY

## 2022-05-25 PROCEDURE — 87660 TRICHOMONAS VAGIN DIR PROBE: CPT | Performed by: OBSTETRICS & GYNECOLOGY

## 2022-05-25 PROCEDURE — 87480 CANDIDA DNA DIR PROBE: CPT | Performed by: OBSTETRICS & GYNECOLOGY

## 2022-05-25 PROCEDURE — 99213 OFFICE O/P EST LOW 20 MIN: CPT | Performed by: OBSTETRICS & GYNECOLOGY

## 2022-05-25 PROCEDURE — 87510 GARDNER VAG DNA DIR PROBE: CPT | Performed by: OBSTETRICS & GYNECOLOGY

## 2022-05-25 PROCEDURE — 87491 CHLMYD TRACH DNA AMP PROBE: CPT | Performed by: OBSTETRICS & GYNECOLOGY

## 2022-05-25 NOTE — PROGRESS NOTES
GYN Problem/Follow Up Visit    Chief Complaint   Patient presents with   • Vaginitis           HPI  Mima Wolfe is a 26 y.o. female, , who presents for vaginal itching at introitus for a few days. Denies vaginal d/c or odor. New sex partner.       Additional OB/GYN History   No LMP recorded.  Current contraception: contraceptive methods: OCP (estrogen/progesterone)  Desires to: continue contraception  Allergies : Metoclopramide and Retin-a [tretinoin]     The additional following portions of the patient's history were reviewed and updated as appropriate: allergies, current medications, past family history, past medical history, past social history, past surgical history and problem list.    Review of Systems    I have reviewed and agree with the HPI, ROS, and historical information as entered above. ANDREA Hwang    Objective   There were no vitals taken for this visit.    Physical Exam  Skin:     General: Skin is warm and dry.   Neurological:      Mental Status: She is alert and oriented to person, place, and time.     pt preferred to self swab.       Assessment and Plan    Diagnoses and all orders for this visit:    1. Acute vaginitis (Primary)  -     Chlamydia trachomatis, Neisseria gonorrhoeae, PCR - Swab, Cervix  -     Gardnerella vaginalis, Trichomonas vaginalis, Candida albicans, DNA - Swab, Vagina    will check for infections. rto prn.     Counseling:  She understands the importance of having the above orders performed in a timely fashion.  She is encouraged to review her results online and/or contact or office if she has questions.     Follow Up:  Return if symptoms worsen or fail to improve.      ANDREA Hwang  2022

## 2022-05-26 ENCOUNTER — TREATMENT (OUTPATIENT)
Dept: PHYSICAL THERAPY | Facility: CLINIC | Age: 27
End: 2022-05-26

## 2022-05-26 DIAGNOSIS — M70.61 TROCHANTERIC BURSITIS OF BOTH HIPS: Primary | ICD-10-CM

## 2022-05-26 DIAGNOSIS — M70.62 TROCHANTERIC BURSITIS OF BOTH HIPS: Primary | ICD-10-CM

## 2022-05-26 DIAGNOSIS — R29.898 WEAKNESS OF BOTH HIPS: ICD-10-CM

## 2022-05-26 DIAGNOSIS — M25.559 PAIN, HIP: ICD-10-CM

## 2022-05-26 PROCEDURE — 97110 THERAPEUTIC EXERCISES: CPT | Performed by: PHYSICAL THERAPIST

## 2022-05-26 NOTE — PROGRESS NOTES
Progress Assessment        Patient: Mima Wolfe   : 1995  Diagnosis/ICD-10 Code:  Trochanteric bursitis of both hips [M70.61, M70.62]  Referring practitioner: ANDREA Oliva  Date of Initial Visit: Type: THERAPY  Noted: 4/15/2022  Today's Date: 2022  Patient seen for 7 sessions      Subjective:   Mima Wolfe reports: 0/10  Subjective Questionnaire: LEFS: 22.5% disability  Clinical Progress: improved  Home Program Compliance: Yes  Treatment has included: therapeutic exercise, neuromuscular re-education, manual therapy and therapeutic activity    Subjective Pt reports her R hip has not caused any pain in a long time. Pt reports her L hip will still occasionally hurt but not with any particular movement. Pt reports no pain in either hip today.     Objective     Left Hip   Planes of Motion   Flexion: 5  Extension: 5  Abduction: 5  Adduction: 5  External rotation: 5  Internal rotation: 5     Right Hip   Planes of Motion   Flexion: 5  Extension: 5  Abduction: 5  Adduction: 5  External rotation: 5  Internal rotation: 5    Assessment/Plan     Pt has made excellent progress with improving strength and flexibility. Pt has 5/5 MMT in all planes in bilateral hips today. Pt also is 0/10 pain today in both hips. Due to patient's excellent progress, patient is discharged today. Provided patient theraband and exercises to continue at home to maintain strength.    1. The patient complains of bilateral hip pain.    LTG 1: 12 weeks: The patient will report a pain rating of 1/10 or better in order to improve tolerance to activities of daily living and improve sleep quality.    STATUS:MET    STG 1a: 6 weeks: The patient will report a pain rating of 3/10 or better.    STATUS: MET    2. The patient demonstrates weakness of the bilateral hip.    LTG 2: 12 weeks: The patient will demonstrate 5/5 strength for bilateral hip flexion, abduction, and extension in order to improve hip stability.    STATUS: MET    STG  3a: The patient will be independent in HEP.    STATUS: MET    Mobility: Walking/Moving Around Functional Limitation    LTG 4: 12 weeks: The patient will demonstrate 20% limitation on the Lower Extremity Functional Scale.    STATUS: PARTIALLT MET - 22%    STG 4a: 6 weeks: The patient will demonstrate 30% limitation on the Lower Extremity Functional Scale.    STATUS: MET    Progress toward previous goals: All Met    See Exercise, Manual, and Modality Logs for complete treatment.         Recommendations: Discharge      PT SIGNATURE: Electronically signed by Susanne Manley, KIRSTEN  [place] LICENSE: [id number]    Based upon review of the patient's progress and continued therapy plan, it is my medical opinion that Mima Wolfe should continue physical therapy treatment at [facility]  [address] [place] [facility] [place]  [place] [address] [phone].      Timed:                 Manual Therapy:    0     mins  24917;     Therapeutic Exercise:    25     mins  47288;     Neuromuscular Harris:    0    mins  36712;    Therapeutic Activity:     0     mins  99729;     Gait Trainin     mins  89794;     Ultrasound:     0     mins  83395;    Ionto                               0    mins   46749  Self pay                         0     mins [email or web address]    Un-Timed:  Electrical Stimulation:    0     mins  18434 (MC )  Canalith Repos    0     mins 50539  Dry Needling     0     mins self-pay  Traction     0     mins 30910    Timed Treatment:   25   mins   Total Treatment:     30   mins      I certify that the therapy services are furnished while this patient is under my care.  The services outlined above are required by this patient, and will be reviewed every 90 days.

## 2022-06-25 NOTE — PROGRESS NOTES
"Well Woman Visit    CC: Scheduled annual well gyn visit  Chief Complaint   Patient presents with   • Gynecologic Exam       HPI:   26 y.o. OCPs- just started, plans to use continuous due to painful menses.  On Laine for acne also.    Menses:   q 28 days, lasts 5-6 days, changes products q 3-4hrs on heaviest days.     Pain:  Just during w cycle.  LBP and all midline.  Tylenol and Midol help some.  Can be severe at times.    History: PMHx, Meds, Allergies, PSHx, Social Hx, and POBHx all reviewed and updated.    Pt has concerns she would like to discuss. Some bilat breast soreness, intermittent.  Worse after excercise.  None today.   And daily anxiety, never used meds in past.    PHYSICAL EXAM:  /74   Ht 162.6 cm (64\")   Wt 88.9 kg (196 lb)   LMP 2022   BMI 33.64 kg/m²  Not found.  General- NAD, alert and oriented, appropriate  Psych- Normal mood, good memory  Neck- No masses, no thyroid enlargement  CV- Regular rhythm, no murnurs  Resp- CTA to bases, no wheezes  Abdomen- Soft, non distended, non tender, no masses    Breast left-  Bilaterally symmetrical, no masses, non tender, no nipple discharge  Breast right- Bilaterally symmetrical, no masses, non tender, no nipple discharge    External genitalia- Normal female, no lesions  Urethra/meatus- Normal, no masses, non tender  Bladder- Normal, no masses, non tender  Vagina- Normal, no atrophy, no lesions, no discharge.  Prolapse: No prolapse  Cvx- Normal, no lesions, no discharge, No cervical motion tenderness  Uterus- Normal size, shape & consistency.  Non tender, mobile, & no prolapse  Adnexa- No mass, non tender  Anus/Rectum/Perineum- Not performed    Lymphatic- No palpable neck, axillary, or groin nodes  Ext- No edema, no cyanosis    Skin- No lesions, no rashes, no acanthosis nigricans      ASSESSMENT and PLAN:    Diagnoses and all orders for this visit:    1. Well woman exam (Primary)  -     IGP,rfx Aptima HPV All Pth  -     Chlamydia " trachomatis, Neisseria gonorrhoeae, PCR - , Cervix  -     Gardnerella vaginalis, Trichomonas vaginalis, Candida albicans, DNA - Swab, Vagina    2. Birth control counseling  -     Drospirenone 4 MG tablet; Take 4 mg by mouth Daily.  Dispense: 28 tablet; Refill: 12    3. Dysmenorrhea  -     ibuprofen (ADVIL,MOTRIN) 800 MG tablet; Take 1 tablet by mouth Every 8 (Eight) Hours As Needed painful period or start motrin 1-2 days prior to period for 3-5days.  Dispense: 30 tablet; Refill: 1    4. Anxiety  -     escitalopram (LEXAPRO) 10 MG tablet; Take 1 tablet by mouth Daily.  Dispense: 30 tablet; Refill: 12    5. Migraine with aura and without status migrainosus, not intractable    6. PCOS (polycystic ovarian syndrome)  Overview:  By US polycystic ovaries and chronic anovulation      7. Other acne    Rec avoid MARGARET w migraine hx, rec transition to POP.  NSAIDs ok for intermittent breast discomfort if OK w GI.     Preventative:  • BREAST HEALTH- Monthly self breast exam importance and how to reviewed. MMG and/or MRI (prn) reviewed per society guidelines and her individual history. Screen: Not medically needed  • CERVICAL CANCER Screening- Reviewed current ASCCP guidelines for screening w and wo cotest HPV, age specific.  Screen: Updated today  • COLON CANCER Screening- Reviewed current medical society guidelines and options.  Screen:  Not medically needed  • Importance of WEIGHT MANAGEMENT, nutrition, and exercise reviewed  • BONE HEALTH- Reviewed current medical society guidelines and options for testing, calcium and vit D intake.  Weight bearing exercise.  DEXA: Not medically needed  • VACCINATIONS Recommended: COVID and booster PRN, Flu annually, Gardisil/HPV vaccine (up to 44yo), Tdap [id number].  Importance discussed, risk being unvaccinated reviewed.  Questions answered  • Smoking status- NON SMOKER  • Follow up PCP/Specialist PMHx and Labs    MYRIAD: Does qualify. She plans to check with her insurance and will return if  desires.    She understands the importance of having any ordered tests to be performed in a timely fashion.  The risks of not performing them include, but are not limited to, advanced cancer stages, bone loss from osteoporosis and/or subsequent increase in morbidity and/or mortality.  She is encouraged to review her results online and/or contact or office if she has questions.     Follow Up:  Return in about 1 year (around 6/27/2023) for WWE.            Indira Ruiz,   06/27/2022    [facility]  [address] [place] [address]  Dept: [phone]  Dept Fax: [phone]  Loc: [phone]  Loc Fax: [phone]

## 2022-06-27 ENCOUNTER — OFFICE VISIT (OUTPATIENT)
Dept: OBSTETRICS AND GYNECOLOGY | Facility: CLINIC | Age: 27
End: 2022-06-27

## 2022-06-27 VITALS
WEIGHT: 196 LBS | SYSTOLIC BLOOD PRESSURE: 116 MMHG | DIASTOLIC BLOOD PRESSURE: 74 MMHG | HEIGHT: 64 IN | BODY MASS INDEX: 33.46 KG/M2

## 2022-06-27 DIAGNOSIS — F41.9 ANXIETY: ICD-10-CM

## 2022-06-27 DIAGNOSIS — N94.6 DYSMENORRHEA: ICD-10-CM

## 2022-06-27 DIAGNOSIS — L70.8 OTHER ACNE: ICD-10-CM

## 2022-06-27 DIAGNOSIS — E28.2 PCOS (POLYCYSTIC OVARIAN SYNDROME): ICD-10-CM

## 2022-06-27 DIAGNOSIS — Z30.09 BIRTH CONTROL COUNSELING: ICD-10-CM

## 2022-06-27 DIAGNOSIS — G43.109 MIGRAINE WITH AURA AND WITHOUT STATUS MIGRAINOSUS, NOT INTRACTABLE: ICD-10-CM

## 2022-06-27 DIAGNOSIS — Z01.419 WELL WOMAN EXAM: Primary | ICD-10-CM

## 2022-06-27 PROBLEM — G43.909 MIGRAINE: Status: ACTIVE | Noted: 2022-06-27

## 2022-06-27 PROCEDURE — 99213 OFFICE O/P EST LOW 20 MIN: CPT | Performed by: OBSTETRICS & GYNECOLOGY

## 2022-06-27 PROCEDURE — 99395 PREV VISIT EST AGE 18-39: CPT | Performed by: OBSTETRICS & GYNECOLOGY

## 2022-06-27 PROCEDURE — 87491 CHLMYD TRACH DNA AMP PROBE: CPT | Performed by: OBSTETRICS & GYNECOLOGY

## 2022-06-27 PROCEDURE — 87660 TRICHOMONAS VAGIN DIR PROBE: CPT | Performed by: OBSTETRICS & GYNECOLOGY

## 2022-06-27 PROCEDURE — 87510 GARDNER VAG DNA DIR PROBE: CPT | Performed by: OBSTETRICS & GYNECOLOGY

## 2022-06-27 PROCEDURE — 87480 CANDIDA DNA DIR PROBE: CPT | Performed by: OBSTETRICS & GYNECOLOGY

## 2022-06-27 PROCEDURE — 87591 N.GONORRHOEAE DNA AMP PROB: CPT | Performed by: OBSTETRICS & GYNECOLOGY

## 2022-06-27 PROCEDURE — G0123 SCREEN CERV/VAG THIN LAYER: HCPCS | Performed by: OBSTETRICS & GYNECOLOGY

## 2022-06-27 RX ORDER — IBUPROFEN 800 MG/1
800 TABLET ORAL EVERY 8 HOURS PRN
Qty: 30 TABLET | Refills: 1 | Status: SHIPPED | OUTPATIENT
Start: 2022-06-27

## 2022-06-27 RX ORDER — ESCITALOPRAM OXALATE 10 MG/1
10 TABLET ORAL DAILY
Qty: 30 TABLET | Refills: 12 | Status: SHIPPED | OUTPATIENT
Start: 2022-06-27 | End: 2022-07-29

## 2022-06-29 LAB
CONV .: NORMAL
CYTOLOGIST CVX/VAG CYTO: NORMAL
CYTOLOGY CVX/VAG DOC CYTO: NORMAL
CYTOLOGY CVX/VAG DOC THIN PREP: NORMAL
DX ICD CODE: NORMAL
HIV 1 & 2 AB SER-IMP: NORMAL
OTHER STN SPEC: NORMAL
STAT OF ADQ CVX/VAG CYTO-IMP: NORMAL

## 2022-07-29 ENCOUNTER — OFFICE VISIT (OUTPATIENT)
Dept: FAMILY MEDICINE CLINIC | Facility: CLINIC | Age: 27
End: 2022-07-29

## 2022-07-29 ENCOUNTER — LAB (OUTPATIENT)
Dept: LAB | Facility: HOSPITAL | Age: 27
End: 2022-07-29

## 2022-07-29 VITALS
OXYGEN SATURATION: 97 % | HEART RATE: 75 BPM | TEMPERATURE: 98.4 F | DIASTOLIC BLOOD PRESSURE: 62 MMHG | WEIGHT: 192.4 LBS | HEIGHT: 64 IN | SYSTOLIC BLOOD PRESSURE: 102 MMHG | BODY MASS INDEX: 32.85 KG/M2

## 2022-07-29 DIAGNOSIS — Z13.29 SCREENING FOR THYROID DISORDER: ICD-10-CM

## 2022-07-29 DIAGNOSIS — R53.83 FATIGUE, UNSPECIFIED TYPE: ICD-10-CM

## 2022-07-29 DIAGNOSIS — E66.9 CLASS 1 OBESITY WITHOUT SERIOUS COMORBIDITY WITH BODY MASS INDEX (BMI) OF 33.0 TO 33.9 IN ADULT, UNSPECIFIED OBESITY TYPE: ICD-10-CM

## 2022-07-29 DIAGNOSIS — F41.9 ANXIETY: ICD-10-CM

## 2022-07-29 DIAGNOSIS — F32.A DEPRESSION, UNSPECIFIED DEPRESSION TYPE: Primary | ICD-10-CM

## 2022-07-29 LAB
ALBUMIN SERPL-MCNC: 4.4 G/DL (ref 3.5–5.2)
ALBUMIN/GLOB SERPL: 1.8 G/DL
ALP SERPL-CCNC: 46 U/L (ref 39–117)
ALT SERPL W P-5'-P-CCNC: 17 U/L (ref 1–33)
ANION GAP SERPL CALCULATED.3IONS-SCNC: 9.9 MMOL/L (ref 5–15)
AST SERPL-CCNC: 17 U/L (ref 1–32)
BASOPHILS # BLD AUTO: 0.06 10*3/MM3 (ref 0–0.2)
BASOPHILS NFR BLD AUTO: 0.7 % (ref 0–1.5)
BILIRUB SERPL-MCNC: 0.2 MG/DL (ref 0–1.2)
BUN SERPL-MCNC: 12 MG/DL (ref 6–20)
BUN/CREAT SERPL: 13.2 (ref 7–25)
CALCIUM SPEC-SCNC: 9.3 MG/DL (ref 8.6–10.5)
CHLORIDE SERPL-SCNC: 103 MMOL/L (ref 98–107)
CO2 SERPL-SCNC: 24.1 MMOL/L (ref 22–29)
CREAT SERPL-MCNC: 0.91 MG/DL (ref 0.57–1)
DEPRECATED RDW RBC AUTO: 38.6 FL (ref 37–54)
EGFRCR SERPLBLD CKD-EPI 2021: 90.5 ML/MIN/1.73
EOSINOPHIL # BLD AUTO: 0.29 10*3/MM3 (ref 0–0.4)
EOSINOPHIL NFR BLD AUTO: 3.2 % (ref 0.3–6.2)
ERYTHROCYTE [DISTWIDTH] IN BLOOD BY AUTOMATED COUNT: 12.7 % (ref 12.3–15.4)
FOLATE SERPL-MCNC: 3.56 NG/ML (ref 4.78–24.2)
GLOBULIN UR ELPH-MCNC: 2.4 GM/DL
GLUCOSE SERPL-MCNC: 95 MG/DL (ref 65–99)
HCT VFR BLD AUTO: 38.9 % (ref 34–46.6)
HGB BLD-MCNC: 13.2 G/DL (ref 12–15.9)
IMM GRANULOCYTES # BLD AUTO: 0.02 10*3/MM3 (ref 0–0.05)
IMM GRANULOCYTES NFR BLD AUTO: 0.2 % (ref 0–0.5)
LYMPHOCYTES # BLD AUTO: 2.11 10*3/MM3 (ref 0.7–3.1)
LYMPHOCYTES NFR BLD AUTO: 23 % (ref 19.6–45.3)
MCH RBC QN AUTO: 29.2 PG (ref 26.6–33)
MCHC RBC AUTO-ENTMCNC: 33.9 G/DL (ref 31.5–35.7)
MCV RBC AUTO: 86.1 FL (ref 79–97)
MONOCYTES # BLD AUTO: 0.96 10*3/MM3 (ref 0.1–0.9)
MONOCYTES NFR BLD AUTO: 10.5 % (ref 5–12)
NEUTROPHILS NFR BLD AUTO: 5.72 10*3/MM3 (ref 1.7–7)
NEUTROPHILS NFR BLD AUTO: 62.4 % (ref 42.7–76)
NRBC BLD AUTO-RTO: 0 /100 WBC (ref 0–0.2)
PLATELET # BLD AUTO: 294 10*3/MM3 (ref 140–450)
PMV BLD AUTO: 11.4 FL (ref 6–12)
POTASSIUM SERPL-SCNC: 4.4 MMOL/L (ref 3.5–5.2)
PROT SERPL-MCNC: 6.8 G/DL (ref 6–8.5)
RBC # BLD AUTO: 4.52 10*6/MM3 (ref 3.77–5.28)
SODIUM SERPL-SCNC: 137 MMOL/L (ref 136–145)
T3FREE SERPL-MCNC: 3.41 PG/ML (ref 2–4.4)
T4 FREE SERPL-MCNC: 1.29 NG/DL (ref 0.93–1.7)
TSH SERPL DL<=0.05 MIU/L-ACNC: 2.72 UIU/ML (ref 0.27–4.2)
VIT B12 BLD-MCNC: 292 PG/ML (ref 211–946)
WBC NRBC COR # BLD: 9.16 10*3/MM3 (ref 3.4–10.8)

## 2022-07-29 PROCEDURE — 36415 COLL VENOUS BLD VENIPUNCTURE: CPT

## 2022-07-29 PROCEDURE — 82607 VITAMIN B-12: CPT

## 2022-07-29 PROCEDURE — 99214 OFFICE O/P EST MOD 30 MIN: CPT

## 2022-07-29 PROCEDURE — 82746 ASSAY OF FOLIC ACID SERUM: CPT

## 2022-07-29 PROCEDURE — 84481 FREE ASSAY (FT-3): CPT

## 2022-07-29 PROCEDURE — 84439 ASSAY OF FREE THYROXINE: CPT

## 2022-07-29 PROCEDURE — 80050 GENERAL HEALTH PANEL: CPT

## 2022-07-29 RX ORDER — HYDROXYZINE HYDROCHLORIDE 25 MG/1
25 TABLET, FILM COATED ORAL 3 TIMES DAILY PRN
Qty: 90 TABLET | Refills: 0 | Status: SHIPPED | OUTPATIENT
Start: 2022-07-29 | End: 2022-08-28

## 2022-07-29 RX ORDER — SERTRALINE HYDROCHLORIDE 25 MG/1
25 TABLET, FILM COATED ORAL DAILY
Qty: 30 TABLET | Refills: 2 | Status: SHIPPED | OUTPATIENT
Start: 2022-07-29 | End: 2022-10-17

## 2022-07-29 NOTE — PROGRESS NOTES
Chief Complaint   Patient presents with   • Establish Care     Was going to primary care providers in [place].    • Depression     Has had depression and anxiety for awhile now but had had never got to the point of seeing anyone for it.    • Anxiety   • Weight Gain     Has been concerned with being able to lose weight she works out and watches her diet and cannot lose any weight.        Subjective          Mima Wolfe presents to [facility]    She is here to establish care. She has been having anxiety and depression worse recently than normal. She works with the [employer] and they prescribed her Lexapro to try. Shes been on that 1 month and it is not working. She says she felt no different. She has also complained of inability to lose weight. She states she has been within the same 2 lbs for the last couple years even with diet and exercise.       Past History:  Medical History: has a past medical history of Anxiety, Chiari malformation type I (HCC), Cholelithiasis, Depression, HSV serum positive I and II, Migraine WITH aura, Ovarian cyst, and Polycystic ovary syndrome.   Surgical History: has a past surgical history that includes Cholecystectomy; Eye surgery; [place] tooth extraction; Esophagogastroduodenoscopy (N/A, 11/15/2021); Colonoscopy (N/A, 11/15/2021); and Cervical Conization (10/03/2017).   Family History: family history includes Colon cancer (age of onset: 30) in her maternal uncle; Colon cancer (age of onset: 40) in her maternal aunt; Diabetes in her father; Fibromyalgia in her mother and sister; Hypertension in her father; Ovarian cancer (age of onset: 25) in her cousin.   Social History: reports that she is a non-smoker but has been exposed to tobacco smoke. She has never used smokeless tobacco. She reports current alcohol use. She reports that she does not use drugs.  Allergies: Metoclopramide and Retin-a [tretinoin]  (Not in a hospital admission)       Social  "History     Socioeconomic History   • Marital status: Single   Tobacco Use   • Smoking status: Passive Smoke Exposure - Never Smoker   • Smokeless tobacco: Never Used   Vaping Use   • Vaping Use: Never used   Substance and Sexual Activity   • Alcohol use: Yes     Comment: CURRENT SOME DAY; OCCASIONALLY DRINKS LESS THAN 1 DRINK PER DAY, HAS BEEN DRINKIGN FOR 2 YEARS    • Drug use: Never   • Sexual activity: Yes     Partners: Male     Birth control/protection: OCP       There are no preventive care reminders to display for this patient.    Objective     Vital Signs:   /62 (BP Location: Left arm, Patient Position: Sitting)   Pulse 75   Temp 98.4 °F (36.9 °C) (Infrared)   Ht 162.6 cm (64\")   Wt 87.3 kg (192 lb 6.4 oz)   SpO2 97%   BMI 33.03 kg/m²       Physical Exam  Constitutional:       Appearance: Normal appearance.   HENT:      Nose: Nose normal.      Mouth/Throat:      Mouth: Mucous membranes are moist.   Cardiovascular:      Rate and Rhythm: Normal rate and regular rhythm.      Pulses: Normal pulses.      Heart sounds: Normal heart sounds.   Pulmonary:      Effort: Pulmonary effort is normal.      Breath sounds: Normal breath sounds.   Skin:     General: Skin is warm and dry.   Neurological:      General: No focal deficit present.      Mental Status: She is alert and oriented to person, place, and time.   Psychiatric:         Mood and Affect: Mood normal.         Behavior: Behavior normal.          Review of Systems   Psychiatric/Behavioral: Positive for depressed mood. The patient is nervous/anxious.         Result Review :                 Assessment and Plan    Diagnoses and all orders for this visit:    1. Depression, unspecified depression type (Primary)  -     sertraline (Zoloft) 25 MG tablet; Take 1 tablet by mouth Daily for 30 days.  Dispense: 30 tablet; Refill: 2    2. Anxiety  -     hydrOXYzine (ATARAX) 25 MG tablet; Take 1 tablet by mouth 3 (Three) Times a Day As Needed for Anxiety for up to " 30 days.  Dispense: 90 tablet; Refill: 0    3. Screening for thyroid disorder  -     TSH+Free T4; Future  -     T3, free; Future    4. Class 1 obesity without serious comorbidity with body mass index (BMI) of 33.0 to 33.9 in adult, unspecified obesity type  -     Comprehensive metabolic panel; Future  -     CBC w AUTO Differential; Future    5. Fatigue, unspecified type  -     Vitamin B12 & Folate; Future    Follow up in a couple weeks to see if zoloft is working.      Pt thought to be clinically stable at this time.    Follow Up   No follow-ups on file.  Patient was given instructions and counseling regarding her condition or for health maintenance advice. Please see specific information pulled into the AVS if appropriate.

## 2022-08-01 DIAGNOSIS — E53.8 LOW FOLATE: Primary | ICD-10-CM

## 2022-08-01 RX ORDER — LANOLIN ALCOHOL/MO/W.PET/CERES
400 CREAM (GRAM) TOPICAL DAILY
Qty: 30 TABLET | Refills: 0 | Status: SHIPPED | OUTPATIENT
Start: 2022-08-01 | End: 2022-09-15

## 2022-08-22 ENCOUNTER — PATIENT ROUNDING (BHMG ONLY) (OUTPATIENT)
Dept: PODIATRY | Facility: CLINIC | Age: 27
End: 2022-08-22

## 2022-08-22 ENCOUNTER — OFFICE VISIT (OUTPATIENT)
Dept: PODIATRY | Facility: CLINIC | Age: 27
End: 2022-08-22

## 2022-08-22 VITALS
DIASTOLIC BLOOD PRESSURE: 52 MMHG | BODY MASS INDEX: 33.97 KG/M2 | HEART RATE: 66 BPM | OXYGEN SATURATION: 100 % | SYSTOLIC BLOOD PRESSURE: 106 MMHG | WEIGHT: 199 LBS | HEIGHT: 64 IN | TEMPERATURE: 97.3 F

## 2022-08-22 DIAGNOSIS — L60.0 ONYCHOCRYPTOSIS: ICD-10-CM

## 2022-08-22 DIAGNOSIS — M79.672 FOOT PAIN, BILATERAL: Primary | ICD-10-CM

## 2022-08-22 DIAGNOSIS — M79.671 FOOT PAIN, BILATERAL: Primary | ICD-10-CM

## 2022-08-22 DIAGNOSIS — L03.039 PARONYCHIA OF TOE, UNSPECIFIED LATERALITY: ICD-10-CM

## 2022-08-22 PROCEDURE — 11750 EXCISION NAIL&NAIL MATRIX: CPT | Performed by: PODIATRIST

## 2022-08-22 PROCEDURE — 99203 OFFICE O/P NEW LOW 30 MIN: CPT | Performed by: PODIATRIST

## 2022-08-22 RX ORDER — ONDANSETRON 4 MG/1
4 TABLET, FILM COATED ORAL AS NEEDED
COMMUNITY

## 2022-08-22 NOTE — PROGRESS NOTES
A [employer] message has been sent to the patient for PATIENT ROUNDING with [facility]

## 2022-08-22 NOTE — PROGRESS NOTES
[facility] - PODIATRY    Today's Date: 08/22/22    Patient Name: Mima Wolfe  MRN: [id number]  [facility]: [id number]  PCP: Janina Hardin APRN  Referring Provider: Referring, Self    SUBJECTIVE     Chief Complaint   Patient presents with   • Right Foot - Ingrown Toenail     Great toe    • Left Foot - Ingrown Toenail     Great toe      HPI: Mima Wolfe, a 26 y.o.female, comes to clinic.    New, Established, New Problem:  New  Location:  Medial and lateral borders of right first and left first toes  Duration:   Greater than 1 year  Onset:  Gradual  Nature:  sore with palpation.  Stable, worsening, improving:   Worsening  Aggravating factors:  Pain with shoe gear and ambulation.  Previous Treatment:  Previous nail procedure by [facility]    No other pedal complaints at this time.    Patient denies any fevers, chills, nausea, vomiting, shortness of breath, nor any other constitutional signs nor symptoms.       Past Medical History:   Diagnosis Date   • Anxiety    • Chiari malformation type I (HCC)    • Cholelithiasis    • Depression    • HSV serum positive I and II    • Ingrown toenail    • Migraine WITH aura    • Ovarian cyst    • Polycystic ovary syndrome      Past Surgical History:   Procedure Laterality Date   • CERVICAL CHIARI DECOMPRESSION POSTERIOR  10/03/2017   • CHOLECYSTECTOMY     • COLONOSCOPY N/A 11/15/2021    Procedure: COLONOSCOPY with biopsy;  Surgeon: Khari Carlson MD;  Location: [facility] ENDOSCOPY;  Service: Gastroenterology;  Laterality: N/A;  normal colon   • ENDOSCOPY N/A 11/15/2021    Procedure: ESOPHAGOGASTRODUODENOSCOPY;  Surgeon: Khari Carlson MD;  Location: [facility] ENDOSCOPY;  Service: Gastroenterology;  Laterality: N/A;  normal EGD   • EYE SURGERY      x2   • WISDOM TOOTH EXTRACTION       Family History   Problem Relation Age of Onset   • Diabetes Father         borderline   • Hypertension Father    • Fibromyalgia Mother    • Fibromyalgia  Sister    • Colon cancer Maternal Aunt 40   • Colon cancer Maternal Uncle 30   • Ovarian cancer Cousin 25   • Malig Hyperthermia Neg Hx    • Uterine cancer Neg Hx    • Breast cancer Neg Hx      Social History     Socioeconomic History   • Marital status: Single   Tobacco Use   • Smoking status: Passive Smoke Exposure - Never Smoker   • Smokeless tobacco: Never Used   Vaping Use   • Vaping Use: Never used   Substance and Sexual Activity   • Alcohol use: Yes     Comment: CURRENT SOME DAY; OCCASIONALLY DRINKS LESS THAN 1 DRINK PER DAY, HAS BEEN DRINKIGN FOR 2 YEARS    • Drug use: Never   • Sexual activity: Yes     Partners: Male     Birth control/protection: OCP     Allergies   Allergen Reactions   • Metoclopramide Shortness Of Breath   • Retin-A [Tretinoin] Rash     Current Outpatient Medications   Medication Sig Dispense Refill   • Drospirenone 4 MG tablet Take 4 mg by mouth Daily. 28 tablet 12   • fluconazole (DIFLUCAN) 150 MG tablet Take 1 tablet by mouth Now, Repeat in 72 Hours. (Patient taking differently: Take 150 mg by mouth As Needed.) 2 tablet 1   • folic acid (Folate) 400 MCG tablet Take 1 tablet by mouth Daily for 30 days. 30 tablet 0   • hydrOXYzine (ATARAX) 25 MG tablet Take 1 tablet by mouth 3 (Three) Times a Day As Needed for Anxiety for up to 30 days. 90 tablet 0   • ibuprofen (ADVIL,MOTRIN) 800 MG tablet Take 1 tablet by mouth Every 8 (Eight) Hours As Needed painful period or start motrin 1-2 days prior to period for 3-5days. 30 tablet 1   • ondansetron (ZOFRAN) 4 MG tablet Take 4 mg by mouth As Needed for Nausea or Vomiting.     • sertraline (Zoloft) 25 MG tablet Take 1 tablet by mouth Daily for 30 days. 30 tablet 2   • valACYclovir (Valtrex) 500 MG tablet Take 1 tablet by mouth 2 (Two) Times a Day. 6 tablet 1     No current facility-administered medications for this visit.     Review of Systems   Constitutional: Negative.    Skin:        Painful Bilateral in-grown toenails   All other systems  reviewed and are negative.      OBJECTIVE     Vitals:    08/22/22 0739   BP: 106/52   Pulse: 66   Temp: 97.3 °F (36.3 °C)   SpO2: 100%       PHYSICAL EXAM  GEN:      Foot/Ankle Exam:       General:   Appearance: obesity    Orientation: AAOx3    Affect: appropriate    Gait: unimpaired      VASCULAR      Right Foot Vascularity   Normal vascular exam    Dorsalis pedis:  2+  Posterior tibial:  2+  Skin Temperature: warm    Edema Grading:  None  CFT:  < 3 seconds  Pedal Hair Growth:  Present  Varicosities: none       Left Foot Vascularity   Normal vascular exam    Dorsalis pedis:  2+  Posterior tibial:  2+  Skin Temperature: warm    Edema Grading:  None  CFT:  < 3 seconds  Pedal Hair Growth:  Present  Varicosities: none        NEUROLOGIC     Right Foot Neurologic   Normal sensation    Light touch sensation:  Normal  Vibratory sensation:  Normal  Hot/Cold sensation: normal       Left Foot Neurologic   Normal sensation    Light touch sensation:  Normal  Vibratory sensation:  Normal  Hot/cold sensation: normal       MUSCLE STRENGTH     Right Foot Muscle Strength   Foot dorsiflexion:  4  Foot plantar flexion:  4  Foot inversion:  4  Foot eversion:  4     Left Foot Muscle Strength   Foot dorsiflexion:  4  Foot plantar flexion:  4  Foot inversion:  4  Foot eversion:  4     RANGE OF MOTION      Right Foot Range of Motion   Foot and ankle ROM within normal limits       Left Foot Range of Motion   Foot and ankle ROM within normal limits       DERMATOLOGIC     Right Foot Dermatologic   Skin: skin intact    Nails: ingrown toenail and paronychia    Nails comment:  Right 1st bilateral nail borders     Left Foot Dermatologic   Skin: skin intact    Nails: ingrown toenail and paronychia    Nails comment:  Left first bilateral nail borders      ASSESSMENT/PLAN     Diagnoses and all orders for this visit:    1. Foot pain, bilateral (Primary)    2. Onychocryptosis    3. Paronychia of toe, unspecified laterality      Comprehensive lower  extremity examination and evaluation was performed.    Discussed findings and treatment plan including risks, benefits, and treatment options with patient in detail. Patient agreed with treatment plan.    Phenol and Alcohol Chemical Matrixectomy Procedure - This procedure is indicated for onychocryptosis of the right and left first medial and lateral nail border(s). Indications, risks and benefits and alternative treatments have been discussed with this patient who has agreed to this procedure. The area was sterilely prepped with a povidone-iodine solution. The affected area was locally anesthetized with 3 ml, of lidocaine 1%. The offending nail plate was completely excised.  Next 3 applications of 89% phenol were applied to the matrix area x 30 seconds followed by irrigation with copious amounts of isopropyl alcohol.  A sterile dressing was applied. The patient tolerated the procedure well.     Patient was given post procedure care instructions.  The patient states understanding and agreement with this plan.    An After Visit Summary was printed and given to the patient at discharge, including (if requested) any available informative/educational handouts regarding diagnosis, treatment, or medications. All questions were answered to patient/family satisfaction. Should symptoms fail to improve or worsen they agree to call or return to clinic or to go to the Emergency Department. Discussed the importance of following up with any needed screening tests/labs/specialist appointments and any requested follow-up recommended by me today. Importance of maintaining follow-up discussed and patient accepts that missed appointments can delay diagnosis and potentially lead to worsening of conditions.    Return in about 3 weeks (around 9/12/2022) for Post-Procedure., or sooner if acute issues arise.    This document has been electronically signed by Yamil Awad DPM on August 22, 2022 08:06 EDT

## 2022-08-24 ENCOUNTER — TELEPHONE (OUTPATIENT)
Dept: FAMILY MEDICINE CLINIC | Facility: CLINIC | Age: 27
End: 2022-08-24

## 2022-08-29 ENCOUNTER — TELEPHONE (OUTPATIENT)
Dept: PODIATRY | Facility: CLINIC | Age: 27
End: 2022-08-29

## 2022-08-30 ENCOUNTER — OFFICE VISIT (OUTPATIENT)
Dept: PODIATRY | Facility: CLINIC | Age: 27
End: 2022-08-30

## 2022-08-30 VITALS
WEIGHT: 199 LBS | HEART RATE: 82 BPM | OXYGEN SATURATION: 100 % | TEMPERATURE: 97.5 F | BODY MASS INDEX: 33.97 KG/M2 | HEIGHT: 64 IN | DIASTOLIC BLOOD PRESSURE: 62 MMHG | SYSTOLIC BLOOD PRESSURE: 113 MMHG

## 2022-08-30 DIAGNOSIS — M79.671 FOOT PAIN, BILATERAL: Primary | ICD-10-CM

## 2022-08-30 DIAGNOSIS — L60.0 ONYCHOCRYPTOSIS: ICD-10-CM

## 2022-08-30 DIAGNOSIS — L03.039 PARONYCHIA OF TOE, UNSPECIFIED LATERALITY: ICD-10-CM

## 2022-08-30 DIAGNOSIS — M79.672 FOOT PAIN, BILATERAL: Primary | ICD-10-CM

## 2022-08-30 PROCEDURE — 99024 POSTOP FOLLOW-UP VISIT: CPT | Performed by: PODIATRIST

## 2022-08-30 RX ORDER — TRAMADOL HYDROCHLORIDE 50 MG/1
TABLET ORAL
COMMUNITY
Start: 2022-08-26 | End: 2022-09-30

## 2022-08-30 NOTE — PROGRESS NOTES
[facility] - PODIATRY    Today's Date: 08/30/22    Patient Name: Mima Wolfe  MRN: [id number]  CSN: [id number]  PCP: Janina Hardin APRN  Referring Provider: No ref. provider found    SUBJECTIVE     Chief Complaint   Patient presents with   • Left Foot - Toe Pain     Pain in injection area for igtn shooting through foot up leg, was walking on other side of foot and twisted ankle      HPI: Mima Wolfe, a 26 y.o.female, comes to clinic.    New, Established, New Problem:  Established  Location:  Medial and lateral borders of right first and left first toes  Duration:   Greater than 1 year  Onset:  Gradual  Nature:  sore with palpation.  Stable, worsening, improving:   Improving  Aggravating factors:  Pain with shoe gear and ambulation.  Previous Treatment: Chemical matrixectomy to affected nail borders    Patient denies any fevers, chills, nausea, vomiting, shortness of breath, nor any other constitutional signs nor symptoms.       Past Medical History:   Diagnosis Date   • Anxiety    • Chiari malformation type I (HCC)    • Cholelithiasis    • Depression    • HSV serum positive I and II    • Ingrown toenail    • Migraine WITH aura    • Ovarian cyst    • Polycystic ovary syndrome      Past Surgical History:   Procedure Laterality Date   • CERVICAL CHIARI DECOMPRESSION POSTERIOR  10/03/2017   • CHOLECYSTECTOMY     • COLONOSCOPY N/A 11/15/2021    Procedure: COLONOSCOPY with biopsy;  Surgeon: Khari Carlson MD;  Location: [facility] ENDOSCOPY;  Service: Gastroenterology;  Laterality: N/A;  normal colon   • ENDOSCOPY N/A 11/15/2021    Procedure: ESOPHAGOGASTRODUODENOSCOPY;  Surgeon: Khari Carlson MD;  Location: [facility] ENDOSCOPY;  Service: Gastroenterology;  Laterality: N/A;  normal EGD   • EYE SURGERY      x2   • WISDOM TOOTH EXTRACTION       Family History   Problem Relation Age of Onset   • Diabetes Father         borderline   • Hypertension Father    • Fibromyalgia Mother    •  Fibromyalgia Sister    • Colon cancer Maternal Aunt 40   • Colon cancer Maternal Uncle 30   • Ovarian cancer Cousin 25   • Malig Hyperthermia Neg Hx    • Uterine cancer Neg Hx    • Breast cancer Neg Hx      Social History     Socioeconomic History   • Marital status: Single   Tobacco Use   • Smoking status: Passive Smoke Exposure - Never Smoker   • Smokeless tobacco: Never Used   Vaping Use   • Vaping Use: Never used   Substance and Sexual Activity   • Alcohol use: Yes     Comment: CURRENT SOME DAY; OCCASIONALLY DRINKS LESS THAN 1 DRINK PER DAY, HAS BEEN DRINKIGN FOR 2 YEARS    • Drug use: Never   • Sexual activity: Yes     Partners: Male     Birth control/protection: OCP     Allergies   Allergen Reactions   • Metoclopramide Shortness Of Breath   • Retin-A [Tretinoin] Rash     Current Outpatient Medications   Medication Sig Dispense Refill   • Drospirenone 4 MG tablet Take 4 mg by mouth Daily. 28 tablet 12   • fluconazole (DIFLUCAN) 150 MG tablet Take 1 tablet by mouth Now, Repeat in 72 Hours. (Patient taking differently: Take 150 mg by mouth As Needed.) 2 tablet 1   • folic acid (Folate) 400 MCG tablet Take 1 tablet by mouth Daily for 30 days. 30 tablet 0   • ibuprofen (ADVIL,MOTRIN) 800 MG tablet Take 1 tablet by mouth Every 8 (Eight) Hours As Needed painful period or start motrin 1-2 days prior to period for 3-5days. 30 tablet 1   • ondansetron (ZOFRAN) 4 MG tablet Take 4 mg by mouth As Needed for Nausea or Vomiting.     • traMADol (ULTRAM) 50 MG tablet      • valACYclovir (Valtrex) 500 MG tablet Take 1 tablet by mouth 2 (Two) Times a Day. 6 tablet 1   • sertraline (Zoloft) 25 MG tablet Take 1 tablet by mouth Daily for 30 days. 30 tablet 2     No current facility-administered medications for this visit.     Review of Systems   Constitutional: Negative.    Skin:        Follow up for bilateral in-grown toenails   All other systems reviewed and are negative.      OBJECTIVE     Vitals:    08/30/22 0855   BP: 113/62    Pulse: 82   Temp: 97.5 °F (36.4 °C)   SpO2: 100%       PHYSICAL EXAM  GEN:      Foot/Ankle Exam:       General:   Appearance: obesity    Orientation: AAOx3    Affect: appropriate    Gait: unimpaired      VASCULAR      Right Foot Vascularity   Normal vascular exam    Dorsalis pedis:  2+  Posterior tibial:  2+  Skin Temperature: warm    Edema Grading:  None  CFT:  < 3 seconds  Pedal Hair Growth:  Present  Varicosities: none       Left Foot Vascularity   Normal vascular exam    Dorsalis pedis:  2+  Posterior tibial:  2+  Skin Temperature: warm    Edema Grading:  None  CFT:  < 3 seconds  Pedal Hair Growth:  Present  Varicosities: none        NEUROLOGIC     Right Foot Neurologic   Normal sensation    Light touch sensation:  Normal  Vibratory sensation:  Normal  Hot/Cold sensation: normal       Left Foot Neurologic   Normal sensation    Light touch sensation:  Normal  Vibratory sensation:  Normal  Hot/cold sensation: normal       MUSCLE STRENGTH     Right Foot Muscle Strength   Foot dorsiflexion:  4  Foot plantar flexion:  4  Foot inversion:  4  Foot eversion:  4     Left Foot Muscle Strength   Foot dorsiflexion:  4  Foot plantar flexion:  4  Foot inversion:  4  Foot eversion:  4     RANGE OF MOTION      Right Foot Range of Motion   Foot and ankle ROM within normal limits       Left Foot Range of Motion   Foot and ankle ROM within normal limits       DERMATOLOGIC     Right Foot Dermatologic   Skin: skin intact    Nails: no ingrown toenail and no paronychia    Nails comment:  Right 1st bilateral nail borders; healing without complications     Left Foot Dermatologic   Skin: skin intact    Nails: no ingrown toenail and no paronychia    Nails comment:  Left first bilateral nail borders; healing without complications      ASSESSMENT/PLAN     Diagnoses and all orders for this visit:    1. Foot pain, bilateral (Primary)    2. Onychocryptosis    3. Paronychia of toe, unspecified laterality      Comprehensive lower extremity  examination and evaluation was performed.    Discussed findings and treatment plan including risks, benefits, and treatment options with patient in detail. Patient agreed with treatment plan.    Patient is to monitor for recurrence and any new symptoms and to contact Dr. Awad's office for a follow-up appointment.      The patient states understanding and agreement with this plan.    An After Visit Summary was printed and given to the patient at discharge, including (if requested) any available informative/educational handouts regarding diagnosis, treatment, or medications. All questions were answered to patient/family satisfaction. Should symptoms fail to improve or worsen they agree to call or return to clinic or to go to the Emergency Department. Discussed the importance of following up with any needed screening tests/labs/specialist appointments and any requested follow-up recommended by me today. Importance of maintaining follow-up discussed and patient accepts that missed appointments can delay diagnosis and potentially lead to worsening of conditions.    Return if symptoms worsen or fail to improve., or sooner if acute issues arise.    This document has been electronically signed by Yamil Awad DPM on August 30, 2022 09:27 EDT

## 2022-09-14 ENCOUNTER — HOSPITAL ENCOUNTER (EMERGENCY)
Facility: HOSPITAL | Age: 27
Discharge: HOME | End: 2022-09-14
Attending: EMERGENCY MEDICINE | Admitting: EMERGENCY MEDICINE

## 2022-09-14 ENCOUNTER — APPOINTMENT (OUTPATIENT)
Dept: CT IMAGING | Facility: HOSPITAL | Age: 27
End: 2022-09-14

## 2022-09-14 VITALS
OXYGEN SATURATION: 100 % | DIASTOLIC BLOOD PRESSURE: 58 MMHG | RESPIRATION RATE: 18 BRPM | HEART RATE: 58 BPM | TEMPERATURE: 97.7 F | SYSTOLIC BLOOD PRESSURE: 114 MMHG | BODY MASS INDEX: 34.29 KG/M2 | HEIGHT: 64 IN | WEIGHT: 200.84 LBS

## 2022-09-14 DIAGNOSIS — K58.9 IRRITABLE BOWEL SYNDROME WITHOUT DIARRHEA: ICD-10-CM

## 2022-09-14 DIAGNOSIS — R10.31 ACUTE ABDOMINAL PAIN IN RIGHT LOWER QUADRANT: Primary | ICD-10-CM

## 2022-09-14 LAB
ALBUMIN SERPL-MCNC: 4.6 G/DL (ref 3.5–5.2)
ALBUMIN/GLOB SERPL: 1.4 G/DL
ALP SERPL-CCNC: 67 U/L (ref 39–117)
ALT SERPL W P-5'-P-CCNC: 19 U/L (ref 1–33)
ANION GAP SERPL CALCULATED.3IONS-SCNC: 12.6 MMOL/L (ref 5–15)
AST SERPL-CCNC: 18 U/L (ref 1–32)
BASOPHILS # BLD AUTO: 0.07 10*3/MM3 (ref 0–0.2)
BASOPHILS NFR BLD AUTO: 0.7 % (ref 0–1.5)
BILIRUB SERPL-MCNC: 0.6 MG/DL (ref 0–1.2)
BILIRUB UR QL STRIP: NEGATIVE
BUN SERPL-MCNC: 14 MG/DL (ref 6–20)
BUN/CREAT SERPL: 16.3 (ref 7–25)
CALCIUM SPEC-SCNC: 9.2 MG/DL (ref 8.6–10.5)
CHLORIDE SERPL-SCNC: 99 MMOL/L (ref 98–107)
CLARITY UR: ABNORMAL
CO2 SERPL-SCNC: 26.4 MMOL/L (ref 22–29)
COLOR UR: YELLOW
CREAT SERPL-MCNC: 0.86 MG/DL (ref 0.57–1)
DEPRECATED RDW RBC AUTO: 39.8 FL (ref 37–54)
EGFRCR SERPLBLD CKD-EPI 2021: 96.9 ML/MIN/1.73
EOSINOPHIL # BLD AUTO: 0.26 10*3/MM3 (ref 0–0.4)
EOSINOPHIL NFR BLD AUTO: 2.6 % (ref 0.3–6.2)
ERYTHROCYTE [DISTWIDTH] IN BLOOD BY AUTOMATED COUNT: 12.8 % (ref 12.3–15.4)
GLOBULIN UR ELPH-MCNC: 3.4 GM/DL
GLUCOSE SERPL-MCNC: 93 MG/DL (ref 65–99)
GLUCOSE UR STRIP-MCNC: NEGATIVE MG/DL
HCG INTACT+B SERPL-ACNC: <0.5 MIU/ML
HCT VFR BLD AUTO: 42.8 % (ref 34–46.6)
HGB BLD-MCNC: 14.7 G/DL (ref 12–15.9)
HGB UR QL STRIP.AUTO: NEGATIVE
HOLD SPECIMEN: 11
HOLD SPECIMEN: 11
IMM GRANULOCYTES # BLD AUTO: 0.04 10*3/MM3 (ref 0–0.05)
IMM GRANULOCYTES NFR BLD AUTO: 0.4 % (ref 0–0.5)
KETONES UR QL STRIP: NEGATIVE
LEUKOCYTE ESTERASE UR QL STRIP.AUTO: NEGATIVE
LIPASE SERPL-CCNC: 37 U/L (ref 13–60)
LYMPHOCYTES # BLD AUTO: 2.43 10*3/MM3 (ref 0.7–3.1)
LYMPHOCYTES NFR BLD AUTO: 24.3 % (ref 19.6–45.3)
MCH RBC QN AUTO: 29.6 PG (ref 26.6–33)
MCHC RBC AUTO-ENTMCNC: 34.3 G/DL (ref 31.5–35.7)
MCV RBC AUTO: 86.1 FL (ref 79–97)
MONOCYTES # BLD AUTO: 0.71 10*3/MM3 (ref 0.1–0.9)
MONOCYTES NFR BLD AUTO: 7.1 % (ref 5–12)
NEUTROPHILS NFR BLD AUTO: 6.49 10*3/MM3 (ref 1.7–7)
NEUTROPHILS NFR BLD AUTO: 64.9 % (ref 42.7–76)
NITRITE UR QL STRIP: NEGATIVE
NRBC BLD AUTO-RTO: 0 /100 WBC (ref 0–0.2)
PH UR STRIP.AUTO: 5.5 [PH] (ref 5–8)
PLATELET # BLD AUTO: 343 10*3/MM3 (ref 140–450)
PMV BLD AUTO: 10.4 FL (ref 6–12)
POTASSIUM SERPL-SCNC: 3.6 MMOL/L (ref 3.5–5.2)
PROT SERPL-MCNC: 8 G/DL (ref 6–8.5)
PROT UR QL STRIP: NEGATIVE
RBC # BLD AUTO: 4.97 10*6/MM3 (ref 3.77–5.28)
SODIUM SERPL-SCNC: 138 MMOL/L (ref 136–145)
SP GR UR STRIP: 1.01 (ref 1–1.03)
UROBILINOGEN UR QL STRIP: ABNORMAL
WBC NRBC COR # BLD: 10 10*3/MM3 (ref 3.4–10.8)
WHOLE BLOOD HOLD COAG: 11
WHOLE BLOOD HOLD SPECIMEN: 11

## 2022-09-14 PROCEDURE — 99283 EMERGENCY DEPT VISIT LOW MDM: CPT

## 2022-09-14 PROCEDURE — 81003 URINALYSIS AUTO W/O SCOPE: CPT

## 2022-09-14 PROCEDURE — 85025 COMPLETE CBC W/AUTO DIFF WBC: CPT

## 2022-09-14 PROCEDURE — 74177 CT ABD & PELVIS W/CONTRAST: CPT

## 2022-09-14 PROCEDURE — 96374 THER/PROPH/DIAG INJ IV PUSH: CPT

## 2022-09-14 PROCEDURE — 83690 ASSAY OF LIPASE: CPT | Performed by: EMERGENCY MEDICINE

## 2022-09-14 PROCEDURE — 36415 COLL VENOUS BLD VENIPUNCTURE: CPT

## 2022-09-14 PROCEDURE — 25010000002 KETOROLAC TROMETHAMINE PER 15 MG: Performed by: EMERGENCY MEDICINE

## 2022-09-14 PROCEDURE — 84702 CHORIONIC GONADOTROPIN TEST: CPT | Performed by: EMERGENCY MEDICINE

## 2022-09-14 PROCEDURE — 0 IOPAMIDOL PER 1 ML: Performed by: EMERGENCY MEDICINE

## 2022-09-14 PROCEDURE — 80053 COMPREHEN METABOLIC PANEL: CPT | Performed by: EMERGENCY MEDICINE

## 2022-09-14 RX ORDER — SODIUM CHLORIDE 0.9 % (FLUSH) 0.9 %
10 SYRINGE (ML) INJECTION AS NEEDED
Status: DISCONTINUED | OUTPATIENT
Start: 2022-09-14 | End: 2022-09-14 | Stop reason: HOSPADM

## 2022-09-14 RX ORDER — DICYCLOMINE HCL 20 MG
20 TABLET ORAL EVERY 8 HOURS PRN
Qty: 15 TABLET | Refills: 0 | Status: SHIPPED | OUTPATIENT
Start: 2022-09-14 | End: 2022-09-30

## 2022-09-14 RX ORDER — KETOROLAC TROMETHAMINE 30 MG/ML
30 INJECTION, SOLUTION INTRAMUSCULAR; INTRAVENOUS ONCE
Status: COMPLETED | OUTPATIENT
Start: 2022-09-14 | End: 2022-09-14

## 2022-09-14 RX ADMIN — IOPAMIDOL 100 ML: 755 INJECTION, SOLUTION INTRAVENOUS at 11:45

## 2022-09-14 RX ADMIN — KETOROLAC TROMETHAMINE 30 MG: 30 INJECTION, SOLUTION INTRAMUSCULAR; INTRAVENOUS at 10:58

## 2022-09-14 NOTE — DISCHARGE INSTRUCTIONS
All of your blood work and urine sample looked normal today and no signs of infection were seen, and the CT scan of your abdomen pelvis look normal, including normal appendix and no kidney stones or ovarian cysts.    Most likely you had some bad abdominal cramps or spasms, and probably have some IBS (irritable bowel syndrome).    Try eating a gluten-free diet for a week to see if it helps your symptoms and you can take the dicyclomine anticramp medicine for your intestines for now and follow-up with your doctor.

## 2022-09-14 NOTE — ED PROVIDER NOTES
Time: 10:33 AM EDT  Arrived by: private car  Chief Complaint: Abdominal pain  History provided by: Patient  History is limited by: N/A     History of Present Illness:  Patient is a 26 y.o. year old female who presents to the emergency department with nausea for 3 days, worsening today. Pt also reports sudden excruciating RLQ pain that onset this morning and has been constant, denies emesis, fever and chills. Pt reports h/o IBS. Pt states she was recently tested for ovarian cysts with negative results. Pt still has her appendix. Pt notes normal BM, denies diarrhea, constipation, hematochezia and melena. Pt denies dysuria, hematuria, urinary urgency, hesitancy or frequency. Pt denies abnormal vaginal bleeding or discharge. Pt has no known h/o drug allergies.     used: No        Similar Symptoms Previously: No  Recently seen: No      Patient Care Team  Primary Care Provider: Janina Hardin APRN    Past Medical History:     Allergies   Allergen Reactions   • Metoclopramide Shortness Of Breath   • Retin-A [Tretinoin] Rash     Past Medical History:   Diagnosis Date   • Anxiety    • Chiari malformation type I (HCC)    • Cholelithiasis    • Depression    • HSV serum positive I and II    • Ingrown toenail    • Migraine WITH aura    • Ovarian cyst    • Polycystic ovary syndrome      Past Surgical History:   Procedure Laterality Date   • CERVICAL CHIARI DECOMPRESSION POSTERIOR  10/03/2017   • CHOLECYSTECTOMY     • COLONOSCOPY N/A 11/15/2021    Procedure: COLONOSCOPY with biopsy;  Surgeon: Khari Carlson MD;  Location: [facility] ENDOSCOPY;  Service: Gastroenterology;  Laterality: N/A;  normal colon   • ENDOSCOPY N/A 11/15/2021    Procedure: ESOPHAGOGASTRODUODENOSCOPY;  Surgeon: Khari Carlson MD;  Location: [facility] ENDOSCOPY;  Service: Gastroenterology;  Laterality: N/A;  normal EGD   • EYE SURGERY      x2   • WISDOM TOOTH EXTRACTION       Family History   Problem Relation Age of Onset   •  Diabetes Father         borderline   • Hypertension Father    • Fibromyalgia Mother    • Fibromyalgia Sister    • Colon cancer Maternal Aunt 40   • Colon cancer Maternal Uncle 30   • Ovarian cancer Cousin 25   • Malig Hyperthermia Neg Hx    • Uterine cancer Neg Hx    • Breast cancer Neg Hx        Home Medications:  Prior to Admission medications    Medication Sig Start Date End Date Taking? Authorizing Provider   Drospirenone 4 MG tablet Take 4 mg by mouth Daily. 6/27/22   Indira Ruiz DO   fluconazole (DIFLUCAN) 150 MG tablet Take 1 tablet by mouth Now, Repeat in 72 Hours.  Patient taking differently: Take 150 mg by mouth As Needed. 5/26/22      folic acid (Folate) 400 MCG tablet Take 1 tablet by mouth Daily for 30 days. 8/1/22 9/15/22  Janina Hardin APRN   ibuprofen (ADVIL,MOTRIN) 800 MG tablet Take 1 tablet by mouth Every 8 (Eight) Hours As Needed painful period or start motrin 1-2 days prior to period for 3-5days. 6/27/22   Indira Ruiz DO   ondansetron (ZOFRAN) 4 MG tablet Take 4 mg by mouth As Needed for Nausea or Vomiting.    Provider, MD Marcela   sertraline (Zoloft) 25 MG tablet Take 1 tablet by mouth Daily for 30 days. 7/29/22 8/28/22  Janina Hardin APRN   traMADol (ULTRAM) 50 MG tablet  8/26/22   ProviderMarcela MD   valACYclovir (Valtrex) 500 MG tablet Take 1 tablet by mouth 2 (Two) Times a Day. 12/20/21   Stephanie Her APRN        Social History:   Social History     Tobacco Use   • Smoking status: Passive Smoke Exposure - Never Smoker   • Smokeless tobacco: Never Used   Vaping Use   • Vaping Use: Never used   Substance Use Topics   • Alcohol use: Yes     Comment: CURRENT SOME DAY; OCCASIONALLY DRINKS LESS THAN 1 DRINK PER DAY, HAS BEEN DRINKIGN FOR 2 YEARS    • Drug use: Never         Review of Systems:  Review of Systems   Constitutional: Negative for chills and fever.   HENT: Negative for congestion, rhinorrhea and sore throat.    Eyes: Negative for pain and visual disturbance.  "  Respiratory: Negative for apnea, cough, chest tightness and shortness of breath.    Cardiovascular: Negative for chest pain and palpitations.   Gastrointestinal: Positive for abdominal pain (RLQ) and nausea. Negative for blood in stool, constipation, diarrhea and vomiting.   Genitourinary: Negative for decreased urine volume, difficulty urinating, dysuria, hematuria, urgency, vaginal bleeding and vaginal discharge.   Musculoskeletal: Negative for joint swelling and myalgias.   Skin: Negative for color change.   Neurological: Negative for seizures and headaches.   Psychiatric/Behavioral: Negative.    All other systems reviewed and are negative.       Physical Exam:  /58   Pulse 58   Temp 97.7 °F (36.5 °C) (Oral)   Resp 18   Ht 162.6 cm (64\")   Wt 91.1 kg (200 lb 13.4 oz)   SpO2 100%   BMI 34.47 kg/m²     Physical Exam  Vitals and nursing note reviewed.   Constitutional:       General: She is not in acute distress.     Appearance: Normal appearance. She is not toxic-appearing.      Comments: In moderate distress due to abdominal pain   HENT:      Head: Normocephalic and atraumatic.      Jaw: There is normal jaw occlusion.   Eyes:      General: Lids are normal.      Extraocular Movements: Extraocular movements intact.      Conjunctiva/sclera: Conjunctivae normal.      Pupils: Pupils are equal, round, and reactive to light.   Cardiovascular:      Rate and Rhythm: Regular rhythm. Tachycardia present.      Pulses: Normal pulses.      Heart sounds: Normal heart sounds.      Comments: Mild tachycardia  Pulmonary:      Effort: Pulmonary effort is normal. No respiratory distress.      Breath sounds: Normal breath sounds. No wheezing or rhonchi.   Abdominal:      General: Abdomen is flat.      Palpations: Abdomen is soft.      Tenderness: There is abdominal tenderness (moderate) in the right lower quadrant. There is no guarding.   Musculoskeletal:         General: Normal range of motion.      Cervical back: " Normal range of motion and neck supple.      Right lower leg: No edema.      Left lower leg: No edema.   Skin:     General: Skin is warm and dry.   Neurological:      Mental Status: She is alert and oriented to person, place, and time. Mental status is at baseline.   Psychiatric:         Mood and Affect: Mood normal.                Medications in the Emergency Department:  Medications   ketorolac (TORADOL) injection 30 mg (30 mg Intravenous Given 9/14/22 1058)   iopamidol (ISOVUE-370) 76 % injection 100 mL (100 mL Intravenous Given 9/14/22 1145)        Labs  Lab Results (last 24 hours)     Procedure Component Value Units Date/Time    CBC & Differential [[id number]]  (Normal) Collected: 09/14/22 1000    Specimen: Blood Updated: 09/14/22 1017    Narrative:      The following orders were created for panel order CBC & Differential.  Procedure                               Abnormality         Status                     ---------                               -----------         ------                     CBC Auto Differential[[id number]]        Normal              Final result                 Please view results for these tests on the individual orders.    Comprehensive Metabolic Panel [[id number]] Collected: 09/14/22 1000    Specimen: Blood Updated: 09/14/22 1042     Glucose 93 mg/dL      BUN 14 mg/dL      Creatinine 0.86 mg/dL      Sodium 138 mmol/L      Potassium 3.6 mmol/L      Chloride 99 mmol/L      CO2 26.4 mmol/L      Calcium 9.2 mg/dL      Total Protein 8.0 g/dL      Albumin 4.60 g/dL      ALT (SGPT) 19 U/L      AST (SGOT) 18 U/L      Alkaline Phosphatase 67 U/L      Total Bilirubin 0.6 mg/dL      Globulin 3.4 gm/dL      A/G Ratio 1.4 g/dL      BUN/Creatinine Ratio 16.3     Anion Gap 12.6 mmol/L      eGFR 96.9 mL/min/1.73      Comment: National Kidney Foundation and American Society of Nephrology (ASN) Task Force recommended calculation based on the Chronic Kidney Disease Epidemiology Collaboration (CKD-EPI)  equation refit without adjustment for race.       Narrative:      GFR Normal >60  Chronic Kidney Disease <60  Kidney Failure <15      Lipase [[id number]]  (Normal) Collected: 09/14/22 1000    Specimen: Blood Updated: 09/14/22 1042     Lipase 37 U/L     hCG, Quantitative, Pregnancy [[id number]] Collected: 09/14/22 1000    Specimen: Blood Updated: 09/14/22 1038     HCG Quantitative <0.50 mIU/mL     Narrative:      HCG Ranges by Gestational Age    Females - non-pregnant premenopausal   </= 1mIU/mL HCG  Females - postmenopausal               </= 7mIU/mL HCG    3 Weeks       5.4   -      72 mIU/mL  4 Weeks      10.2   -     708 mIU/mL  5 Weeks       217   -   8,245 mIU/mL  6 Weeks       152   -  32,177 mIU/mL  7 Weeks     4,059   - 153,767 mIU/mL  8 Weeks    31,366   - 149,094 mIU/mL  9 Weeks    59,109   - 135,901 mIU/mL  10 Weeks   44,186   - 170,409 mIU/mL  12 Weeks   27,107   - 201,615 mIU/mL  14 Weeks   24,302   -  93,646 mIU/mL  15 Weeks   12,540   -  69,747 mIU/mL  16 Weeks    8,904   -  55,332 mIU/mL  17 Weeks    8,240   -  51,793 mIU/mL  18 Weeks    9,649   -  55,271 mIU/mL    Results may be falsely decreased if patient taking Biotin.      CBC Auto Differential [[id number]]  (Normal) Collected: 09/14/22 1000    Specimen: Blood Updated: 09/14/22 1017     WBC 10.00 10*3/mm3      RBC 4.97 10*6/mm3      Hemoglobin 14.7 g/dL      Hematocrit 42.8 %      MCV 86.1 fL      MCH 29.6 pg      MCHC 34.3 g/dL      RDW 12.8 %      RDW-SD 39.8 fl      MPV 10.4 fL      Platelets 343 10*3/mm3      Neutrophil % 64.9 %      Lymphocyte % 24.3 %      Monocyte % 7.1 %      Eosinophil % 2.6 %      Basophil % 0.7 %      Immature Grans % 0.4 %      Neutrophils, Absolute 6.49 10*3/mm3      Lymphocytes, Absolute 2.43 10*3/mm3      Monocytes, Absolute 0.71 10*3/mm3      Eosinophils, Absolute 0.26 10*3/mm3      Basophils, Absolute 0.07 10*3/mm3      Immature Grans, Absolute 0.04 10*3/mm3      nRBC 0.0 /100 WBC     Urinalysis With Microscopic If  Indicated (No Culture) - Urine, Clean Catch [[id number]]  (Abnormal) Collected: 09/14/22 1003    Specimen: Urine, Clean Catch Updated: 09/14/22 1016     Color, UA Yellow     Appearance, UA Cloudy     pH, UA 5.5     Specific Gravity, UA 1.012     Glucose, UA Negative     Ketones, UA Negative     Bilirubin, UA Negative     Blood, UA Negative     Protein, UA Negative     Leuk Esterase, UA Negative     Nitrite, UA Negative     Urobilinogen, UA 0.2 E.U./dL    Narrative:      Urine microscopic not indicated.           Imaging:  CT Abdomen Pelvis With Contrast    Result Date: 9/14/2022  PROCEDURE: CT ABDOMEN PELVIS W CONTRAST  COMPARISON: [facility], CT, ABDOMEN/PELVIS WITH CONTRAST, 8/22/2019, 18:53.  INDICATIONS: RLQ abdominal pain (Age >= 14y)  TECHNIQUE: CT images were created with non-ionic intravenous contrast material.   PROTOCOL:   Standard imaging protocol performed    RADIATION:   DLP: 625.3mGy*cm   Automated exposure control was utilized to minimize radiation dose. CONTRAST: 100cc Isovue 370 I.V.  FINDINGS:  The lung bases are clear.  The liver is of normal size.  The liver is of diffusely diminished density consistent with mild fatty infiltration.  Surgical clips are seen in the gallbladder bed, the gallbladder is absent.  No pancreatic or adrenal mass is evident.  The spleen is of normal size.  The kidneys enhance bilaterally.  No renal or ureteral stones are seen.  There is no evidence of hydronephrosis.  The urinary bladder is not abnormally distended.  The uterus and ovaries have a normal appearance.  Bowel loops are not abnormally dilated.  The appendix is normal.  No diverticula are evident.  The anterior abdominal wall is intact, no hernia is evident.  Bony structures appear intact.        CT scan of the abdomen and pelvis with IV contrast demonstrating fatty liver.  Post cholecystectomy.     EM CREWS MD       Electronically Signed and Approved By: EM CREWS MD on 9/14/2022 at  11:59               Procedures:  Procedures    Progress                            Medical Decision Making:  MDM     In my differential diagnosis of this patient with abdominal pain, I considered viral gastroenteritis, acute gastritis, GERD exacerbation with esophagitis, peptic ulcer disease, pancreatitis, cholecystitis, appendicitis.          This patient is a healthy-appearing 26-year-old female presenting with acute onset of severe right lower quadrant abdominal pain.    She had some tenderness on exam and we considered possibility of acute appendicitis or renal colic or ovarian cyst.    All of her lab work here including white blood cell count, LFTs and lipase and urinalysis are reassuring and normal.    Pregnancy test negative.    CT imaging of the abdomen pelvis came back negative for any acute findings, such as appendicitis, and I consider IBS possible and I will start her on a gluten-free diet and some dicyclomine for cramps and have her follow-up with her PCP.      Final diagnoses:   Acute abdominal pain in right lower quadrant   Irritable bowel syndrome without diarrhea        Disposition:  ED Disposition     ED Disposition   Discharge    Condition   Stable    Comment   --             This medical record created using voice recognition software and a virtual scribe.    Documentation assistance provided by Verónica Pelaez acting as scribe for Ronny Oseguera MD. Information recorded by the scribe was done at my direction and has been verified and validated by me.          Verónica Pelaez  09/14/22 6713       Ronny Oseguera MD  09/14/22 8335

## 2022-09-30 ENCOUNTER — OFFICE VISIT (OUTPATIENT)
Dept: FAMILY MEDICINE CLINIC | Facility: CLINIC | Age: 27
End: 2022-09-30

## 2022-09-30 VITALS
SYSTOLIC BLOOD PRESSURE: 121 MMHG | HEART RATE: 93 BPM | WEIGHT: 203.1 LBS | OXYGEN SATURATION: 98 % | BODY MASS INDEX: 34.67 KG/M2 | TEMPERATURE: 98.4 F | HEIGHT: 64 IN | DIASTOLIC BLOOD PRESSURE: 67 MMHG

## 2022-09-30 DIAGNOSIS — E66.9 CLASS 1 OBESITY WITHOUT SERIOUS COMORBIDITY WITH BODY MASS INDEX (BMI) OF 34.0 TO 34.9 IN ADULT, UNSPECIFIED OBESITY TYPE: Chronic | ICD-10-CM

## 2022-09-30 DIAGNOSIS — Z76.89 ENCOUNTER FOR WEIGHT MANAGEMENT: Primary | ICD-10-CM

## 2022-09-30 PROBLEM — E66.811 CLASS 1 OBESITY WITHOUT SERIOUS COMORBIDITY WITH BODY MASS INDEX (BMI) OF 34.0 TO 34.9 IN ADULT: Chronic | Status: ACTIVE | Noted: 2022-09-30

## 2022-09-30 PROCEDURE — 99213 OFFICE O/P EST LOW 20 MIN: CPT

## 2022-09-30 RX ORDER — HYDROXYZINE HYDROCHLORIDE 25 MG/1
25 TABLET, FILM COATED ORAL 3 TIMES DAILY PRN
COMMUNITY

## 2022-09-30 NOTE — ASSESSMENT & PLAN NOTE
Patient's (Body mass index is 34.86 kg/m².) indicates that they are obese (BMI >30) with health conditions that include none . Weight is unchanged. BMI is is above average; BMI management plan is completed. We discussed portion control and increasing exercise.

## 2022-09-30 NOTE — PROGRESS NOTES
Chief Complaint   Patient presents with   • Weight Loss     Feels like no matter what she does she cannot lose weight and at times feels like she is gaining weight. She had been on Topamax for migraines and was told it would help with weight loss but it never did.         Subjective          Mima Wolfe presents to [facility]    History of Present Illness  She is here to be seen for weight issues. She states she has been trying to lose weight for 3 years in different ways and has not been successful in doing so. She would like to try something else. I have explained to her about phentermine or saxenda. She has chosen that she wants to try Saxenda. She has a BMI of 34.86.       Past History:  Medical History: has a past medical history of Anxiety, Chiari malformation type I (HCC), Cholelithiasis, Depression, HSV serum positive I and II, Ingrown toenail, Migraine WITH aura, Ovarian cyst, and Polycystic ovary syndrome.   Surgical History: has a past surgical history that includes Cholecystectomy; Eye surgery; [place] tooth extraction; Esophagogastroduodenoscopy (N/A, 11/15/2021); Colonoscopy (N/A, 11/15/2021); and Cervical Conization (10/03/2017).   Family History: family history includes Colon cancer (age of onset: 30) in her maternal uncle; Colon cancer (age of onset: 40) in her maternal aunt; Diabetes in her father; Fibromyalgia in her mother and sister; Hypertension in her father; Ovarian cancer (age of onset: 25) in her cousin.   Social History: reports that she is a non-smoker but has been exposed to tobacco smoke. She has never used smokeless tobacco. She reports current alcohol use. She reports that she does not use drugs.  Allergies: Metoclopramide and Retin-a [tretinoin]  (Not in a hospital admission)       Social History     Socioeconomic History   • Marital status: Single   Tobacco Use   • Smoking status: Passive Smoke Exposure - Never Smoker   • Smokeless tobacco: Never  "Used   Vaping Use   • Vaping Use: Never used   Substance and Sexual Activity   • Alcohol use: Yes     Comment: CURRENT SOME DAY; OCCASIONALLY DRINKS LESS THAN 1 DRINK PER DAY, HAS BEEN DRINKIGN FOR 2 YEARS    • Drug use: Never   • Sexual activity: Yes     Partners: Male     Birth control/protection: OCP       There are no preventive care reminders to display for this patient.    Objective     Vital Signs:   /67 (BP Location: Right arm, Patient Position: Sitting)   Pulse 93   Temp 98.4 °F (36.9 °C) (Infrared)   Ht 162.6 cm (64\")   Wt 92.1 kg (203 lb 1.6 oz)   SpO2 98%   BMI 34.86 kg/m²       Physical Exam  Constitutional:       Appearance: Normal appearance. She is obese.   HENT:      Nose: Nose normal.      Mouth/Throat:      Mouth: Mucous membranes are moist.   Cardiovascular:      Rate and Rhythm: Normal rate.      Pulses: Normal pulses.   Pulmonary:      Effort: Pulmonary effort is normal.   Skin:     General: Skin is warm and dry.   Neurological:      General: No focal deficit present.      Mental Status: She is alert and oriented to person, place, and time.   Psychiatric:         Mood and Affect: Mood normal.         Behavior: Behavior normal.          Review of Systems   All other systems reviewed and are negative.       Result Review :                 Assessment and Plan    Diagnoses and all orders for this visit:    1. Encounter for weight management (Primary)    2. Class 1 obesity without serious comorbidity with body mass index (BMI) of 34.0 to 34.9 in adult, unspecified obesity type  Assessment & Plan:  Patient's (Body mass index is 34.86 kg/m².) indicates that they are obese (BMI >30) with health conditions that include none . Weight is unchanged. BMI is is above average; BMI management plan is completed. We discussed portion control and increasing exercise.     Orders:  -     Liraglutide (SAXENDA) 18 MG/3ML injection pen; Inject 0.6mg under skin daily for week one, THEN 1.2mg daily for week " two, THEN 1.8mg daily for week three, then 2.4mg daily for week four.  Dispense: 15 mL; Refill: 0    I spent 25 minutes caring for Mima on this date of service. This time includes time spent by me in the following activities:preparing for the visit, reviewing tests, obtaining and/or reviewing a separately obtained history, performing a medically appropriate examination and/or evaluation , counseling and educating the patient/family/caregiver, ordering medications, tests, or procedures and documenting information in the medical record    Pt thought to be clinically stable at this time.    Follow Up   No follow-ups on file.  Patient was given instructions and counseling regarding her condition or for health maintenance advice. Please see specific information pulled into the AVS if appropriate.

## 2022-10-14 ENCOUNTER — LAB (OUTPATIENT)
Dept: OBSTETRICS AND GYNECOLOGY | Facility: CLINIC | Age: 27
End: 2022-10-14

## 2022-10-14 DIAGNOSIS — Z01.812 PRE-PROCEDURAL LABORATORY EXAMINATION: Primary | ICD-10-CM

## 2022-10-14 LAB — HCG INTACT+B SERPL-ACNC: <1 MIU/ML

## 2022-10-14 PROCEDURE — 84702 CHORIONIC GONADOTROPIN TEST: CPT | Performed by: OBSTETRICS & GYNECOLOGY

## 2022-10-14 NOTE — PROGRESS NOTES
"IUD Placement    Chief Complaint   Patient presents with   • Contraception       Sex in last 2 weeks:  no  Bhcg:  Negative  Uhcg:  Not done  Consent signed: yes  Last PAP date and result: 2022 Neg    Procedure was explained in detail.  She understands the potential risks include, but are not limited to, failure (pregnancy), pain, bleeding, uterine perforation, and infection.  Her questions have been answered.      Subjective:  26 y.o. Patient's last menstrual period was 10/03/2022.    Objective:  /83   Pulse 98   Ht 162.6 cm (64\")   Wt 89.8 kg (198 lb)   LMP 10/03/2022   BMI 33.99 kg/m²   General- NAD, alert and oriented, appropriate  Psych- Normal mood, good memory  Abdomen- Soft, non distended, non tender, no masses  External genitalia- Normal, no lesions  Vagina- Normal, no discharge  Uterus- Uterus sounded to 7cm.  Normal size, shape & consistency.  Non tender, mobile, & no prolapse, Anteverted.    Cervix- Normal without lesion or discharge.   Betadine/Hibiclens x3.  Tenaculum placed.  Mirena IUD placed without difficulty.    Strings cut 2cm.      Patient tolerated the procedure well.    Assessment and Plan:  Diagnoses and all orders for this visit:    1. Encounter for IUD insertion (Primary)  -     Levonorgestrel (MIRENA) 20 MCG/DAY IUD intrauterine device 20 each        Counseling:  She was counseled on the use of the IUD.  It provides contraception for 7 years (Mirena), 5years (Kyleena/Liletta) or 3 years (Jaida) or 10 years (Copper).  Failure is <1%.  It does not protect her from STDs and condoms are recommended.  She has been instructed to check the strings monthly.     .  PRECAUTIONS-- If she has any fevers >101.5F, and abdominal/pelvic pain, or bleeding > 1pad/2hours, she needs to call our office or on call/ER (after hours/weekend).  She understands the potential risk of pelvic inflammatory disease and the potential for an effect on her future fertility if she does not seek " immediate evaluation.  Cramping due to the IUD should be controlled with a OTC reliever/NSAID.  If not, she should call our office.  If she misses a period and has a positive pregnancy test she must immediately seek medical attention due to the risk of ectopic pregnancy which can be life threatening.  She understands removal can sometimes be difficult and can require surgery.    Follow Up:  Return in about 1 month (around 11/17/2022) for IUD check.          Indira Ruiz, DO  10/17/2022    [facility]  [address] [place] [address]  Dept: [phone]  Dept Fax: [phone]  Loc: [phone]  Loc Fax: [phone]

## 2022-10-17 ENCOUNTER — PROCEDURE VISIT (OUTPATIENT)
Dept: OBSTETRICS AND GYNECOLOGY | Facility: CLINIC | Age: 27
End: 2022-10-17

## 2022-10-17 VITALS
HEIGHT: 64 IN | BODY MASS INDEX: 33.8 KG/M2 | WEIGHT: 198 LBS | SYSTOLIC BLOOD PRESSURE: 128 MMHG | DIASTOLIC BLOOD PRESSURE: 83 MMHG | HEART RATE: 98 BPM

## 2022-10-17 DIAGNOSIS — Z30.430 ENCOUNTER FOR IUD INSERTION: Primary | ICD-10-CM

## 2022-10-17 PROCEDURE — 58300 INSERT INTRAUTERINE DEVICE: CPT | Performed by: OBSTETRICS & GYNECOLOGY

## 2022-11-02 ENCOUNTER — TELEMEDICINE (OUTPATIENT)
Dept: FAMILY MEDICINE CLINIC | Facility: CLINIC | Age: 27
End: 2022-11-02

## 2022-11-02 DIAGNOSIS — Z53.21 PATIENT LEFT WITHOUT BEING SEEN: Primary | ICD-10-CM

## 2022-11-14 NOTE — PROGRESS NOTES
Post IUD Visit      CC:  Scheduled IUD check  Chief Complaint   Patient presents with   • IUD Check       HPI:  Pain/Cramping:  yes, after intercourse  Vaginal Bleeding:  yes, everyday, had full menses, now random bleeding.  Some clots quarter size and intermittent changes to be clean, but needs at least panty liner daily.    Pain w sex: no, only after all since IUD placement  Feels strings easily:  yes  Last PAP date and results: 06/27/2022 Neg    PHYSICAL EXAM:  /78   Wt 89.8 kg (198 lb)   BMI 33.99 kg/m²   General- NAD, alert and oriented, appropriate  Psych- Normal mood, good memory  Abdomen- Soft, non distended, non tender, no masses  External genitalia- Normal, no lesions  Urethra- Normal, no masses, non tender  Vagina- Normal, no atrophy, no discharge, no prolapse  Bladder- Normal, no masses, non tender, no prolapse  Cervix- Normal, no lesions, no discharge, No cervical motion tenderness, IUD strings visable 1.5cm, Small 1mm x4mm mucous/white membrane at os.  Uterus- Normal size, shape & consistency.  Non tender, mobile, & no prolapse  Adnexa- No mass, non tender    ASSESSMENT AND PLAN:    Diagnoses and all orders for this visit:    1. IUD check up (Primary)    2. Female dyspareunia/after sex  -     hCG, Quantitative, Pregnancy  -     US Pelvis Transvaginal Non OB; Future    Discussed with patient exam is normal today, small mucus at cervix may be within normal limits, recommend rule out pregnancy as etiology of bleeding and pain after intercourse.  By her description of bleeding, I suspect this is normal after 1 month of Mirena IUD placement.  Will check ultrasound to make sure IUD placement appears within normal limits.  Declines removal today.    Counseling:  Pt was counseled to perform monthly string checks. Keep track of menses.    RTO if <q21d, >7d long, or heavy or if positive pregnancy test.    Continue OTC motrin and/or tylenol PRN cramping    Follow Up:  Return in about 1 week (around  11/22/2022) for [facility].          Indira Ruiz,   11/15/2022    [facility]  [address] [place] [address]  Dept: [phone]  Dept Fax: [phone]  Loc: [phone]  Loc Fax: [phone]

## 2022-11-15 ENCOUNTER — OFFICE VISIT (OUTPATIENT)
Dept: OBSTETRICS AND GYNECOLOGY | Facility: CLINIC | Age: 27
End: 2022-11-15

## 2022-11-15 VITALS — SYSTOLIC BLOOD PRESSURE: 124 MMHG | DIASTOLIC BLOOD PRESSURE: 78 MMHG | WEIGHT: 198 LBS | BODY MASS INDEX: 33.99 KG/M2

## 2022-11-15 DIAGNOSIS — N94.10 FEMALE DYSPAREUNIA: ICD-10-CM

## 2022-11-15 DIAGNOSIS — Z30.431 IUD CHECK UP: Primary | ICD-10-CM

## 2022-11-15 LAB — HCG INTACT+B SERPL-ACNC: <0.5 MIU/ML

## 2022-11-15 PROCEDURE — 84702 CHORIONIC GONADOTROPIN TEST: CPT | Performed by: OBSTETRICS & GYNECOLOGY

## 2022-11-15 PROCEDURE — 99213 OFFICE O/P EST LOW 20 MIN: CPT | Performed by: OBSTETRICS & GYNECOLOGY

## 2022-11-17 PROBLEM — N94.10 FEMALE DYSPAREUNIA: Status: ACTIVE | Noted: 2022-11-17

## 2022-11-17 NOTE — PROGRESS NOTES
GYN Visit    Chief Complaint   Patient presents with   • Follow-up     GYN US and Labs, possible IUD removal       HPI:   26 y.o. LMP: No LMP recorded. Patient has had an implant.     Social History     Substance and Sexual Activity   Sexual Activity Yes   • Partners: Male   • Birth control/protection: OCP     hCG, Quantitative, Pregnancy (11/15/2022 11:18)   US Pelvis Transvaginal Non OB (2022 10:30)   Progress Notes by Indira Ruiz DO (11/15/2022 10:30)     Beta-hCG negative and ultrasound showed tip of IUD slightly off place and near versus barely entering internal cervical os.    History: PMHx, Meds, Allergies, PSHx, Social Hx, and POBHx all reviewed and updated.    PHYSICAL EXAM:  /74   Wt 89.8 kg (198 lb)   BMI 33.99 kg/m²   General- NAD, alert and oriented, appropriate  Psych- Normal mood, good memory    External genitalia- Normal female, no lesions  Urethra/meatus- Normal, no masses, non tender, no prolapse  Bladder- Normal, no masses, non tender, no prolapse  Vagina- Normal, no atrophy, no lesions, discharge versus small membranes at os, no prolapse    Lymphatic- No palpable groin nodes  Extremities- No edema, no cyanosis    Skin- No lesions, no rashes      ASSESSMENT AND PLAN:  Diagnoses and all orders for this visit:    1. Female dyspareunia (Primary)  Overview:  Post coital since IUD placement    2. Vaginal discharge  -     Gardnerella vaginalis, Trichomonas vaginalis, Candida albicans, DNA - Swab, Vagina    We discussed the ultrasound findings, I reviewed the pictures with her and the ultrasound technician in detail.  Option of expectant management since the IUD does extend to the fundus.  Should not affect efficacy of the IUD.  Since she is having some pain with intercourse afterwards, option of removing Mirena and replacing slightly smaller Kyleena.  Risks and benefits of Mirena versus Kyleena along with side effects and efficacy reviewed.  Patient desires Mirena removed and  Kyleena replaced.  See below for IUD removal and replacement.        Follow Up:  Return in about 1 month (around 12/18/2022) for IUD check.        Indira Ruiz, DO  11/18/2022    [facility]  [address] [place] [address]  Dept: [phone]  Dept Fax: [phone]  Loc: [phone]  Loc Fax: [phone]     IUD Placement    Chief Complaint   Patient presents with   • Follow-up     GYN US and Labs, possible IUD removal       Sex in last 2 weeks:  yes, Has IUD in place  Bhcg:  Negative  Consent signed: yes    Procedure was explained in detail.  She understands the potential risks include, but are not limited to, failure (pregnancy), pain, bleeding, uterine perforation, and infection.  Her questions have been answered.      Subjective:  See above    Objective:  /74   Wt 89.8 kg (198 lb)   BMI 33.99 kg/m²   General- NAD, alert and oriented, appropriate  Psych- Normal mood, good memory  Abdomen- Soft, non distended, non tender, no masses  External genitalia- Normal, no lesions  Vagina- Normal, no discharge  Uterus- Uterus sounded to 8cm.  Normal size, shape & consistency.  Non tender, mobile, & no prolapse, Anteverted.    Cervix- Normal without lesion or discharge. IUD REMOVAL: Strings visualized, IUD removed intact.  Discarded. 3.5-4cm long by 2mm wide membrane removed from cvx with Federico, discarded.  Betadine/Hibiclens x3.  Tenaculum placed.  Kyleena IUD placed without difficulty.    Strings cut 2cm.      Patient tolerated the procedure well.    Assessment and Plan:  Diagnoses and all orders for this visit:    1. Encounter for IUD removal and insertion  -     levonorgestrel (KYLEENA) 19.5 MG IUD 1 each      Counseling:  She was counseled on the use of the IUD.  It provides contraception for 8 years (Mirena), 5years (Kyleena/Liletta) or 3 years (Jaida) or 10 years (Copper).  Failure is <1%.  It does not protect her from STDs and condoms are recommended.   She has been instructed to check the strings monthly.     .  PRECAUTIONS-- If she has any fevers >101.5F, and abdominal/pelvic pain, or bleeding > 1pad/2hours, she needs to call our office or on call/ER (after hours/weekend).  She understands the potential risk of pelvic inflammatory disease and the potential for an effect on her future fertility if she does not seek immediate evaluation.  Cramping due to the IUD should be controlled with a OTC reliever/NSAID.  If not, she should call our office.  If she misses a period and has a positive pregnancy test she must immediately seek medical attention due to the risk of ectopic pregnancy which can be life threatening.  She understands removal can sometimes be difficult and can require surgery.    Follow Up:  Return in about 1 month (around 12/18/2022) for IUD check.          Indira Ruiz, DO  11/18/2022    [facility]  [address] [place] [address] [address] [place] [address]  Dept: [phone]  Dept Fax: [phone]  Loc: [phone]  Loc Fax: [phone]

## 2022-11-18 ENCOUNTER — OFFICE VISIT (OUTPATIENT)
Dept: OBSTETRICS AND GYNECOLOGY | Facility: CLINIC | Age: 27
End: 2022-11-18

## 2022-11-18 VITALS — WEIGHT: 198 LBS | DIASTOLIC BLOOD PRESSURE: 74 MMHG | BODY MASS INDEX: 33.99 KG/M2 | SYSTOLIC BLOOD PRESSURE: 118 MMHG

## 2022-11-18 DIAGNOSIS — Z30.430 ENCOUNTER FOR IUD INSERTION: ICD-10-CM

## 2022-11-18 DIAGNOSIS — N89.8 VAGINAL DISCHARGE: ICD-10-CM

## 2022-11-18 DIAGNOSIS — N94.10 FEMALE DYSPAREUNIA: Primary | ICD-10-CM

## 2022-11-18 LAB
CANDIDA SPECIES: NEGATIVE
GARDNERELLA VAGINALIS: POSITIVE
T VAGINALIS DNA VAG QL PROBE+SIG AMP: NEGATIVE

## 2022-11-18 PROCEDURE — 87510 GARDNER VAG DNA DIR PROBE: CPT | Performed by: OBSTETRICS & GYNECOLOGY

## 2022-11-18 PROCEDURE — 99212 OFFICE O/P EST SF 10 MIN: CPT | Performed by: OBSTETRICS & GYNECOLOGY

## 2022-11-18 PROCEDURE — 58301 REMOVE INTRAUTERINE DEVICE: CPT | Performed by: OBSTETRICS & GYNECOLOGY

## 2022-11-18 PROCEDURE — 87480 CANDIDA DNA DIR PROBE: CPT | Performed by: OBSTETRICS & GYNECOLOGY

## 2022-11-18 PROCEDURE — 87660 TRICHOMONAS VAGIN DIR PROBE: CPT | Performed by: OBSTETRICS & GYNECOLOGY

## 2022-11-18 PROCEDURE — 58300 INSERT INTRAUTERINE DEVICE: CPT | Performed by: OBSTETRICS & GYNECOLOGY

## 2022-11-21 ENCOUNTER — TELEPHONE (OUTPATIENT)
Dept: OBSTETRICS AND GYNECOLOGY | Facility: CLINIC | Age: 27
End: 2022-11-21

## 2022-11-21 DIAGNOSIS — N76.0 ACUTE VAGINITIS: Primary | ICD-10-CM

## 2022-11-21 RX ORDER — METRONIDAZOLE 7.5 MG/G
GEL VAGINAL
Qty: 70 G | Refills: 0 | Status: SHIPPED | OUTPATIENT
Start: 2022-11-21 | End: 2022-11-28

## 2022-11-21 NOTE — TELEPHONE ENCOUNTER
Patient has viewed her results. She has trouble taking Flagyl and requests the gel to be sent in instead. Please sign the attached order for Metrogel if it is okay to send that instead.

## 2022-11-21 NOTE — TELEPHONE ENCOUNTER
----- Message from Indira Ruiz DO sent at 11/20/2022  5:59 PM EST -----  BV per protocol please.  Thank you.

## 2023-02-27 ENCOUNTER — TELEPHONE (OUTPATIENT)
Dept: OBSTETRICS AND GYNECOLOGY | Facility: CLINIC | Age: 28
End: 2023-02-27
Payer: COMMERCIAL

## 2023-04-05 ENCOUNTER — TELEPHONE (OUTPATIENT)
Dept: OBSTETRICS AND GYNECOLOGY | Facility: CLINIC | Age: 28
End: 2023-04-05
Payer: COMMERCIAL

## 2023-04-25 ENCOUNTER — OFFICE VISIT (OUTPATIENT)
Dept: OBSTETRICS AND GYNECOLOGY | Facility: CLINIC | Age: 28
End: 2023-04-25
Payer: COMMERCIAL

## 2023-04-25 VITALS
HEIGHT: 64 IN | DIASTOLIC BLOOD PRESSURE: 83 MMHG | HEART RATE: 86 BPM | SYSTOLIC BLOOD PRESSURE: 118 MMHG | BODY MASS INDEX: 38.24 KG/M2 | WEIGHT: 224 LBS

## 2023-04-25 DIAGNOSIS — N93.9 ABNORMAL UTERINE BLEEDING: Primary | ICD-10-CM

## 2023-04-25 DIAGNOSIS — R10.2 PELVIC PAIN IN FEMALE: ICD-10-CM

## 2023-04-25 DIAGNOSIS — N89.8 VAGINAL DISCHARGE: ICD-10-CM

## 2023-04-25 LAB
CANDIDA SPECIES: NEGATIVE
GARDNERELLA VAGINALIS: NEGATIVE
T VAGINALIS DNA VAG QL PROBE+SIG AMP: NEGATIVE

## 2023-04-25 PROCEDURE — 87510 GARDNER VAG DNA DIR PROBE: CPT | Performed by: OBSTETRICS & GYNECOLOGY

## 2023-04-25 PROCEDURE — 87480 CANDIDA DNA DIR PROBE: CPT | Performed by: OBSTETRICS & GYNECOLOGY

## 2023-04-25 PROCEDURE — 87660 TRICHOMONAS VAGIN DIR PROBE: CPT | Performed by: OBSTETRICS & GYNECOLOGY

## 2023-04-25 RX ORDER — LEVONORGESTREL 19.5 MG/1
INTRAUTERINE DEVICE INTRAUTERINE
COMMUNITY
Start: 2022-11-18

## 2023-04-25 NOTE — PROGRESS NOTES
"GYN Problem/Follow Up Visit    Chief Complaint   Patient presents with   • iud check           HPI  Mima Wolfe is a 27 y.o. female, , who presents for aub, cramping, and vaginal d/c. States has kyleena and has not had any bleeding for six months and then four days ago she started bleeding. States it is not very heavy. Started cramping today. Intermittent generalized. Also states thought she had bv a couple weeks ago and used metrogel and sx seem better.        Additional OB/GYN History   No LMP recorded. Patient has had an implant.  Current contraception: contraceptive methods: IUD.  Insertion date:   Desires to: continue contraception  Allergies : Metoclopramide and Retin-a [tretinoin]     The additional following portions of the patient's history were reviewed and updated as appropriate: allergies, current medications, past family history, past medical history, past social history, past surgical history and problem list.    Review of Systems    I have reviewed and agree with the HPI, ROS, and historical information as entered above. Stephanie Her, APRN    Objective   /83   Pulse 86   Ht 162.6 cm (64\")   Wt 102 kg (224 lb)   BMI 38.45 kg/m²     Physical Exam  Vitals reviewed.   Genitourinary:     General: Normal vulva.      Vagina: No signs of injury and foreign body. Bleeding (scant dark) present. No vaginal discharge, erythema, tenderness, lesions or prolapsed vaginal walls.      Cervix: No cervical motion tenderness, discharge, friability, lesion or erythema.      Uterus: Normal. Not tender.       Adnexa: Right adnexa normal and left adnexa normal.        Right: No tenderness.          Left: No tenderness.        Comments: iud strings present at 2 cm  Skin:     General: Skin is warm and dry.   Neurological:      Mental Status: She is alert and oriented to person, place, and time.            Assessment and Plan    Diagnoses and all orders for this visit:    1. Abnormal uterine bleeding " (Primary)  -     US Non-ob Transvaginal; Future  -     Gardnerella vaginalis, Trichomonas vaginalis, Candida albicans, DNA - Swab, Vagina    2. Pelvic pain in female  -     US Non-ob Transvaginal; Future  -     Gardnerella vaginalis, Trichomonas vaginalis, Candida albicans, DNA - Swab, Vagina    3. Vaginal discharge  -     Gardnerella vaginalis, Trichomonas vaginalis, Candida albicans, DNA - Swab, Vagina    will check for infections and pelvic u/s. Pt desires to continue with kyleena for now but has some concerns with weight gain so she is considering options. She will f/u after her u/s. To the er for worsening sx or new concerns.     Counseling:  She understands the importance of having the above orders performed in a timely fashion.  She is encouraged to review her results online and/or contact or office if she has questions.     Follow Up:  Return for u/s f/u.      ANDREA Hwang  04/25/2023

## 2023-09-22 ENCOUNTER — OFFICE VISIT (OUTPATIENT)
Dept: FAMILY MEDICINE CLINIC | Facility: CLINIC | Age: 28
End: 2023-09-22
Payer: COMMERCIAL

## 2023-09-22 VITALS
DIASTOLIC BLOOD PRESSURE: 78 MMHG | OXYGEN SATURATION: 98 % | SYSTOLIC BLOOD PRESSURE: 133 MMHG | HEART RATE: 64 BPM | TEMPERATURE: 97.8 F | HEIGHT: 64 IN | BODY MASS INDEX: 37.36 KG/M2 | RESPIRATION RATE: 18 BRPM | WEIGHT: 218.8 LBS

## 2023-09-22 DIAGNOSIS — R11.0 NAUSEA: ICD-10-CM

## 2023-09-22 DIAGNOSIS — R12 HEARTBURN: ICD-10-CM

## 2023-09-22 DIAGNOSIS — Q07.00 ARNOLD-CHIARI SYNDROME WITHOUT SPINA BIFIDA OR HYDROCEPHALUS: Primary | ICD-10-CM

## 2023-09-22 DIAGNOSIS — F41.9 ANXIETY AND DEPRESSION: ICD-10-CM

## 2023-09-22 DIAGNOSIS — G95.0 SYRINGOMYELIA: ICD-10-CM

## 2023-09-22 DIAGNOSIS — F32.A ANXIETY AND DEPRESSION: ICD-10-CM

## 2023-09-22 PROCEDURE — 99214 OFFICE O/P EST MOD 30 MIN: CPT

## 2023-09-22 RX ORDER — DULOXETIN HYDROCHLORIDE 20 MG/1
20 CAPSULE, DELAYED RELEASE ORAL DAILY
Qty: 30 CAPSULE | Refills: 0 | Status: SHIPPED | OUTPATIENT
Start: 2023-09-22

## 2023-09-22 RX ORDER — ESCITALOPRAM OXALATE 10 MG/1
10 TABLET ORAL DAILY
Qty: 30 TABLET | Refills: 2 | Status: CANCELLED | OUTPATIENT
Start: 2023-09-22

## 2023-09-22 RX ORDER — ONDANSETRON 4 MG/1
4 TABLET, FILM COATED ORAL AS NEEDED
Qty: 30 TABLET | Refills: 0 | Status: SHIPPED | OUTPATIENT
Start: 2023-09-22

## 2023-09-22 NOTE — PROGRESS NOTES
Chief Complaint   Patient presents with    Anxiety     Ongoing, needs a medication adjustment.    GI Problem     Having increased nausea and occasional heartburn.     Obesity     Wants to discuss weight loss options.       Subjective          Mima Wolfe presents to [facility]    History of Present Illness  Mima is here to be seen for anxiety that has been ongoing and needs a medication adjustment for this. She states the hydroxyzine is not working and she has tried zoloft and lexapro in the past and neither of those worked. She has an MRI next Friday to see if she needs another brain surgery or not and thinks this is why she is having such increased anxiety and depression. She is also having ongoing nausea and migraines. I have explained to her nausea can also be caused by increase of intracranial pressure.     Past History:  Medical History: has a past medical history of Anxiety, Chiari malformation type I, Cholelithiasis, Depression, HSV serum positive I and II, Ingrown toenail, Migraine WITH aura, Ovarian cyst, and Polycystic ovary syndrome.   Surgical History: has a past surgical history that includes Cholecystectomy; Eye surgery; [place] tooth extraction; Esophagogastroduodenoscopy (N/A, 11/15/2021); Colonoscopy (N/A, 11/15/2021); and Cervical Conization (10/03/2017).   Family History: family history includes Colon cancer (age of onset: 30) in her maternal uncle; Colon cancer (age of onset: 40) in her maternal aunt; Diabetes in her father; Fibromyalgia in her mother and sister; Hypertension in her father; Ovarian cancer (age of onset: 25) in her cousin.   Social History: reports that she has never smoked. She has been exposed to tobacco smoke. She has never used smokeless tobacco. She reports current alcohol use. She reports that she does not use drugs.  Allergies: Metoclopramide and Retin-a [tretinoin]  (Not in a hospital admission)       Social History     Socioeconomic  "History    Marital status: Single   Tobacco Use    Smoking status: Never     Passive exposure: Yes    Smokeless tobacco: Never   Vaping Use    Vaping Use: Never used   Substance and Sexual Activity    Alcohol use: Yes     Comment: CURRENT SOME DAY; OCCASIONALLY DRINKS LESS THAN 1 DRINK PER DAY, HAS BEEN DRINKIGN FOR 2 YEARS     Drug use: Never    Sexual activity: Yes     Partners: Male     Birth control/protection: I.U.D.     Comment: Kyleena Nov 2022       There are no preventive care reminders to display for this patient.    Objective     Vital Signs:   /78 (BP Location: Right arm, Patient Position: Sitting, Cuff Size: Adult)   Pulse 64   Temp 97.8 °F (36.6 °C) (Temporal)   Resp 18   Ht 162.6 cm (64\")   Wt 99.2 kg (218 lb 12.8 oz)   SpO2 98%   BMI 37.56 kg/m²       Physical Exam  Constitutional:       Appearance: Normal appearance.   HENT:      Nose: Nose normal.      Mouth/Throat:      Mouth: Mucous membranes are moist.   Cardiovascular:      Rate and Rhythm: Normal rate and regular rhythm.      Pulses: Normal pulses.      Heart sounds: Normal heart sounds.   Pulmonary:      Effort: Pulmonary effort is normal.      Breath sounds: Normal breath sounds.   Skin:     General: Skin is warm and dry.   Neurological:      General: No focal deficit present.      Mental Status: She is alert and oriented to person, place, and time.   Psychiatric:         Mood and Affect: Mood normal.         Behavior: Behavior normal.        Review of Systems   Gastrointestinal:  Positive for nausea.   Neurological:  Positive for headache.   Psychiatric/Behavioral:  Positive for depressed mood. The patient is nervous/anxious.    All other systems reviewed and are negative.     Result Review :                 Assessment and Plan    Diagnoses and all orders for this visit:    1. 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 Syringomyelia    3. Nausea  -     ondansetron (ZOFRAN) 4 MG tablet; Take 1 " tablet by mouth As Needed for Nausea or Vomiting.  Dispense: 30 tablet; Refill: 0  -     Ambulatory Referral to Gastroenterology    4. Anxiety and depression  -     DULoxetine (CYMBALTA) 20 MG capsule; Take 1 capsule by mouth Daily.  Dispense: 30 capsule; Refill: 0  -     Ambulatory Referral to Behavioral Health    5. Heartburn  -     Ambulatory Referral to Gastroenterology          Pt thought to be clinically stable at this time.    Follow Up   Return if symptoms worsen or fail to improve.  Patient was given instructions and counseling regarding her condition or for health maintenance advice. Please see specific information pulled into the AVS if appropriate.

## 2023-09-27 ENCOUNTER — TRANSCRIBE ORDERS (OUTPATIENT)
Dept: ADMINISTRATIVE | Facility: HOSPITAL | Age: 28
End: 2023-09-27
Payer: COMMERCIAL

## 2023-09-27 DIAGNOSIS — I82.0 BUDD-CHIARI SYNDROME: Primary | ICD-10-CM

## 2023-10-18 ENCOUNTER — APPOINTMENT (OUTPATIENT)
Dept: MRI IMAGING | Facility: HOSPITAL | Age: 28
End: 2023-10-18
Payer: COMMERCIAL

## 2023-10-18 ENCOUNTER — HOSPITAL ENCOUNTER (EMERGENCY)
Facility: HOSPITAL | Age: 28
Discharge: HOME | End: 2023-10-18
Attending: EMERGENCY MEDICINE | Admitting: EMERGENCY MEDICINE
Payer: COMMERCIAL

## 2023-10-18 ENCOUNTER — APPOINTMENT (OUTPATIENT)
Dept: CT IMAGING | Facility: HOSPITAL | Age: 28
End: 2023-10-18
Payer: COMMERCIAL

## 2023-10-18 VITALS
HEART RATE: 72 BPM | TEMPERATURE: 98.5 F | DIASTOLIC BLOOD PRESSURE: 62 MMHG | BODY MASS INDEX: 36.51 KG/M2 | SYSTOLIC BLOOD PRESSURE: 107 MMHG | OXYGEN SATURATION: 100 % | HEIGHT: 64 IN | RESPIRATION RATE: 22 BRPM | WEIGHT: 213.85 LBS

## 2023-10-18 DIAGNOSIS — G43.809 OTHER MIGRAINE WITHOUT STATUS MIGRAINOSUS, NOT INTRACTABLE: Primary | ICD-10-CM

## 2023-10-18 LAB
ALBUMIN SERPL-MCNC: 4.5 G/DL (ref 3.5–5.2)
ALBUMIN/GLOB SERPL: 1.3 G/DL
ALP SERPL-CCNC: 76 U/L (ref 39–117)
ALT SERPL W P-5'-P-CCNC: 26 U/L (ref 1–33)
ANION GAP SERPL CALCULATED.3IONS-SCNC: 12 MMOL/L (ref 5–15)
AST SERPL-CCNC: 18 U/L (ref 1–32)
BASOPHILS # BLD AUTO: 0.07 10*3/MM3 (ref 0–0.2)
BASOPHILS NFR BLD AUTO: 0.5 % (ref 0–1.5)
BILIRUB SERPL-MCNC: 0.4 MG/DL (ref 0–1.2)
BUN SERPL-MCNC: 12 MG/DL (ref 6–20)
BUN/CREAT SERPL: 13.8 (ref 7–25)
CALCIUM SPEC-SCNC: 9.6 MG/DL (ref 8.6–10.5)
CHLORIDE SERPL-SCNC: 101 MMOL/L (ref 98–107)
CO2 SERPL-SCNC: 26 MMOL/L (ref 22–29)
CREAT SERPL-MCNC: 0.87 MG/DL (ref 0.57–1)
DEPRECATED RDW RBC AUTO: 38.6 FL (ref 37–54)
EGFRCR SERPLBLD CKD-EPI 2021: 95 ML/MIN/1.73
EOSINOPHIL # BLD AUTO: 0.22 10*3/MM3 (ref 0–0.4)
EOSINOPHIL NFR BLD AUTO: 1.7 % (ref 0.3–6.2)
ERYTHROCYTE [DISTWIDTH] IN BLOOD BY AUTOMATED COUNT: 12.4 % (ref 12.3–15.4)
GLOBULIN UR ELPH-MCNC: 3.4 GM/DL
GLUCOSE SERPL-MCNC: 101 MG/DL (ref 65–99)
HCG INTACT+B SERPL-ACNC: <0.5 MIU/ML
HCT VFR BLD AUTO: 43.7 % (ref 34–46.6)
HGB BLD-MCNC: 14.7 G/DL (ref 12–15.9)
HOLD SPECIMEN: NORMAL
HOLD SPECIMEN: NORMAL
IMM GRANULOCYTES # BLD AUTO: 0.05 10*3/MM3 (ref 0–0.05)
IMM GRANULOCYTES NFR BLD AUTO: 0.4 % (ref 0–0.5)
LYMPHOCYTES # BLD AUTO: 2.79 10*3/MM3 (ref 0.7–3.1)
LYMPHOCYTES NFR BLD AUTO: 21.8 % (ref 19.6–45.3)
MCH RBC QN AUTO: 28.7 PG (ref 26.6–33)
MCHC RBC AUTO-ENTMCNC: 33.6 G/DL (ref 31.5–35.7)
MCV RBC AUTO: 85.4 FL (ref 79–97)
MONOCYTES # BLD AUTO: 0.88 10*3/MM3 (ref 0.1–0.9)
MONOCYTES NFR BLD AUTO: 6.9 % (ref 5–12)
NEUTROPHILS NFR BLD AUTO: 68.7 % (ref 42.7–76)
NEUTROPHILS NFR BLD AUTO: 8.77 10*3/MM3 (ref 1.7–7)
NRBC BLD AUTO-RTO: 0 /100 WBC (ref 0–0.2)
PLATELET # BLD AUTO: 322 10*3/MM3 (ref 140–450)
PMV BLD AUTO: 10.5 FL (ref 6–12)
POTASSIUM SERPL-SCNC: 3.9 MMOL/L (ref 3.5–5.2)
PROT SERPL-MCNC: 7.9 G/DL (ref 6–8.5)
RBC # BLD AUTO: 5.12 10*6/MM3 (ref 3.77–5.28)
SODIUM SERPL-SCNC: 139 MMOL/L (ref 136–145)
WBC NRBC COR # BLD: 12.78 10*3/MM3 (ref 3.4–10.8)
WHOLE BLOOD HOLD COAG: NORMAL
WHOLE BLOOD HOLD SPECIMEN: NORMAL

## 2023-10-18 PROCEDURE — 96374 THER/PROPH/DIAG INJ IV PUSH: CPT

## 2023-10-18 PROCEDURE — 25010000002 KETOROLAC TROMETHAMINE PER 15 MG

## 2023-10-18 PROCEDURE — 96375 TX/PRO/DX INJ NEW DRUG ADDON: CPT

## 2023-10-18 PROCEDURE — 70551 MRI BRAIN STEM W/O DYE: CPT

## 2023-10-18 PROCEDURE — 99284 EMERGENCY DEPT VISIT MOD MDM: CPT

## 2023-10-18 PROCEDURE — 36415 COLL VENOUS BLD VENIPUNCTURE: CPT

## 2023-10-18 PROCEDURE — 85025 COMPLETE CBC W/AUTO DIFF WBC: CPT

## 2023-10-18 PROCEDURE — 80053 COMPREHEN METABOLIC PANEL: CPT

## 2023-10-18 PROCEDURE — 25010000002 DIPHENHYDRAMINE PER 50 MG

## 2023-10-18 PROCEDURE — 25010000002 PROCHLORPERAZINE 10 MG/2ML SOLUTION

## 2023-10-18 PROCEDURE — 70450 CT HEAD/BRAIN W/O DYE: CPT

## 2023-10-18 PROCEDURE — 25810000003 SODIUM CHLORIDE 0.9 % SOLUTION

## 2023-10-18 PROCEDURE — 84702 CHORIONIC GONADOTROPIN TEST: CPT | Performed by: EMERGENCY MEDICINE

## 2023-10-18 RX ORDER — KETOROLAC TROMETHAMINE 15 MG/ML
30 INJECTION, SOLUTION INTRAMUSCULAR; INTRAVENOUS ONCE
Status: COMPLETED | OUTPATIENT
Start: 2023-10-18 | End: 2023-10-18

## 2023-10-18 RX ORDER — PROCHLORPERAZINE EDISYLATE 5 MG/ML
5 INJECTION INTRAMUSCULAR; INTRAVENOUS ONCE
Status: COMPLETED | OUTPATIENT
Start: 2023-10-18 | End: 2023-10-18

## 2023-10-18 RX ORDER — PROCHLORPERAZINE EDISYLATE 5 MG/ML
5 INJECTION INTRAMUSCULAR; INTRAVENOUS EVERY 6 HOURS PRN
Status: DISCONTINUED | OUTPATIENT
Start: 2023-10-18 | End: 2023-10-18

## 2023-10-18 RX ORDER — DIPHENHYDRAMINE HYDROCHLORIDE 50 MG/ML
25 INJECTION INTRAMUSCULAR; INTRAVENOUS ONCE
Status: COMPLETED | OUTPATIENT
Start: 2023-10-18 | End: 2023-10-18

## 2023-10-18 RX ADMIN — DIPHENHYDRAMINE HYDROCHLORIDE 25 MG: 50 INJECTION INTRAMUSCULAR; INTRAVENOUS at 17:08

## 2023-10-18 RX ADMIN — PROCHLORPERAZINE EDISYLATE 5 MG: 5 INJECTION INTRAMUSCULAR; INTRAVENOUS at 17:08

## 2023-10-18 RX ADMIN — SODIUM CHLORIDE 500 ML: 9 INJECTION, SOLUTION INTRAVENOUS at 17:07

## 2023-10-18 RX ADMIN — KETOROLAC TROMETHAMINE 30 MG: 15 INJECTION, SOLUTION INTRAMUSCULAR; INTRAVENOUS at 17:08

## 2023-10-18 NOTE — DISCHARGE INSTRUCTIONS
Your CT and MRI completed in the emergency department today are normal.  Return for new or worsening symptoms concerning to you.

## 2023-10-18 NOTE — ED PROVIDER NOTES
Time: 3:41 PM EDT  Date of encounter:  10/18/2023  Independent Historian/Clinical History and Information was obtained by:   Patient and Family    History is limited by: N/A    Chief Complaint   Patient presents with    Headache     migraine x3 days,dizzy and vomiting at work today, pt fell last week/passed out and had loc and doesn't know if she hit her head,    Vomiting         History of Present Illness:  Patient is a 27 y.o. year old female who presents to the emergency department for evaluation of fall, migraine, nausea, vomiting. Has hx of chiari decompression surgery on October 3, 2017.  Was recently on vacation 3 days ago slipped on wet stairs hitting the back of her head, she states that she fell down approximately 5-6 stairs.  Did sustain LOC.  Has had migraines since.  Has been having blurred vision and spots in her visual field.  This happened before the fall, day of the fall and this morning.  She has an MRI scheduled in a couple of weeks.  Denies any numbness or tingling.  Has chronic back pain where she had surgery but no change from baseline.  Has been having nausea and vomiting over the past 3 days. Denies abdominal pain. (Tung Kelly PA-C provider in triage 3:42 PM EDT ) patient states that she is occasionally having shortness of air but denies any chest pain.  Patient states that shortness of air occurs when her migraine becomes severe and she states that she begins to have a panic attack.    Patient Care Team  Primary Care Provider: Janina Hardin APRN    Past Medical History:     Allergies   Allergen Reactions    Metoclopramide Shortness Of Breath    Retin-A [Tretinoin] Rash     Past Medical History:   Diagnosis Date    Anxiety     Chiari malformation type I     Cholelithiasis     Depression     HSV serum positive I and II     Ingrown toenail     Migraine WITH aura     Ovarian cyst     Polycystic ovary syndrome      Past Surgical History:   Procedure Laterality Date    CERVICAL CHIARI  DECOMPRESSION POSTERIOR  10/03/2017    CHOLECYSTECTOMY      COLONOSCOPY N/A 11/15/2021    Procedure: COLONOSCOPY with biopsy;  Surgeon: Khari Carlson MD;  Location: [facility];  Service: Gastroenterology;  Laterality: N/A;  normal colon    ENDOSCOPY N/A 11/15/2021    Procedure: ESOPHAGOGASTRODUODENOSCOPY;  Surgeon: Khari Carlson MD;  Location: [facility] ENDOSCOPY;  Service: Gastroenterology;  Laterality: N/A;  normal EGD    EYE SURGERY      x2    WISDOM TOOTH EXTRACTION       Family History   Problem Relation Age of Onset    Diabetes Father         borderline    Hypertension Father     Fibromyalgia Mother     Fibromyalgia Sister     Colon cancer Maternal Aunt 40    Colon cancer Maternal Uncle 30    Ovarian cancer Cousin 25    Malig Hyperthermia Neg Hx     Uterine cancer Neg Hx     Breast cancer Neg Hx        Home Medications:  Prior to Admission medications    Medication Sig Start Date End Date Taking? Authorizing Provider   DULoxetine (CYMBALTA) 20 MG capsule Take 1 capsule by mouth Daily. 9/22/23   Janina Hardin APRN   hydrOXYzine (ATARAX) 25 MG tablet Take 1 tablet by mouth 3 (Three) Times a Day As Needed for Itching.    Provider, MD Marcela   levonorgestrel (Kyleena) 19.5 MG intrauterine device IUD  11/18/22   Provider, MD Marcela   ondansetron (ZOFRAN) 4 MG tablet Take 1 tablet by mouth As Needed for Nausea or Vomiting. 9/22/23   Janina Hardin APRN        Social History:   Social History     Tobacco Use    Smoking status: Never     Passive exposure: Yes    Smokeless tobacco: Never   Vaping Use    Vaping Use: Never used   Substance Use Topics    Alcohol use: Yes     Comment: CURRENT SOME DAY; OCCASIONALLY DRINKS LESS THAN 1 DRINK PER DAY, HAS BEEN DRINKIGN FOR 2 YEARS     Drug use: Never         Review of Systems:  Review of Systems   Constitutional:  Negative for fever.   Eyes:  Positive for visual disturbance.   Respiratory:  Positive for shortness of breath.   "  Cardiovascular:  Negative for chest pain.   Gastrointestinal:  Positive for nausea and vomiting. Negative for abdominal pain.   Neurological:  Positive for syncope and headaches. Negative for numbness.        Physical Exam:  /68   Pulse 63   Temp 98.5 °F (36.9 °C) (Oral)   Resp 22   Ht 162.6 cm (64\")   Wt 97 kg (213 lb 13.5 oz)   SpO2 100%   BMI 36.71 kg/m²         Physical Exam  Vitals and nursing note reviewed.   Constitutional:       General: She is not in acute distress.     Appearance: Normal appearance. She is not ill-appearing, toxic-appearing or diaphoretic.      Comments: tearful   HENT:      Head: Normocephalic and atraumatic.        Nose: Nose normal.      Mouth/Throat:      Mouth: Mucous membranes are moist.   Eyes:      Extraocular Movements: Extraocular movements intact.      Conjunctiva/sclera: Conjunctivae normal.      Pupils: Pupils are equal, round, and reactive to light.   Cardiovascular:      Rate and Rhythm: Normal rate and regular rhythm.      Heart sounds: Normal heart sounds.   Pulmonary:      Effort: Pulmonary effort is normal.      Breath sounds: Normal breath sounds.   Abdominal:      General: Abdomen is flat. Bowel sounds are normal. There is no distension.      Palpations: Abdomen is soft. There is no mass.      Tenderness: There is no abdominal tenderness. There is no guarding or rebound.      Hernia: No hernia is present.   Musculoskeletal:         General: Normal range of motion.      Cervical back: Normal range of motion and neck supple.   Skin:     General: Skin is warm and dry.   Neurological:      General: No focal deficit present.      Mental Status: She is alert and oriented to person, place, and time.      Cranial Nerves: No cranial nerve deficit.      Sensory: No sensory deficit.      Motor: No weakness.      Coordination: Coordination normal.   Psychiatric:         Mood and Affect: Mood normal.         Behavior: Behavior normal.         Thought Content: Thought " content normal.         Judgment: Judgment normal.                    Procedures:  Procedures      Medical Decision Making:    Comorbidities that affect care:    Migraine, Brittany malformation type I    External Notes reviewed:    Previous Clinic Note: Urgent care visit from today where patient was seen for same complaint referred to the emergency department for further care      The following orders were placed and all results were independently analyzed by me:  Orders Placed This Encounter   Procedures    CT Head Without Contrast    MRI Brain Without Contrast    Comprehensive Metabolic Panel    hCG, Quantitative, Pregnancy    [place] Draw    CBC Auto Differential    CBC & Differential    Green Top (Gel)    Lavender Top    Gold Top - SST    Light Blue Top       Medications Given in the Emergency Department:  Medications   ketorolac (TORADOL) injection 30 mg (30 mg Intravenous Given 10/18/23 1708)   diphenhydrAMINE (BENADRYL) injection 25 mg (25 mg Intravenous Given 10/18/23 1708)   sodium chloride 0.9 % bolus 500 mL (0 mL Intravenous Stopped 10/18/23 1750)   prochlorperazine (COMPAZINE) injection 5 mg (5 mg Intravenous Given 10/18/23 1708)        ED Course:    The patient was initially evaluated in the triage area where orders were placed. The patient was later dispositioned by Gerard Skinner PA-C.      The patient was advised to stay for completion of workup which includes but is not limited to communication of labs and radiological results, reassessment and plan. The patient was advised that leaving prior to disposition by a provider could result in critical findings that are not communicated to the patient.     ED Course as of 10/18/23 1930   Wed Oct 18, 2023   1907 MRI Brain Without Contrast  Normal brain MRI examination.  Findings of suboccipital craniotomy. [MD]      ED Course User Index  [MD] Gerard Skinner PA-C       Labs:    Lab Results (last 24 hours)       Procedure Component Value Units Date/Time     CBC & Differential [[id number]]  (Abnormal) Collected: 10/18/23 1545    Specimen: Blood from Arm, Right Updated: 10/18/23 1551    Narrative:      The following orders were created for panel order CBC & Differential.  Procedure                               Abnormality         Status                     ---------                               -----------         ------                     CBC Auto Differential[[id number]]        Abnormal            Final result                 Please view results for these tests on the individual orders.    Comprehensive Metabolic Panel [[id number]]  (Abnormal) Collected: 10/18/23 1545    Specimen: Blood from Arm, Right Updated: 10/18/23 1614     Glucose 101 mg/dL      BUN 12 mg/dL      Creatinine 0.87 mg/dL      Sodium 139 mmol/L      Potassium 3.9 mmol/L      Chloride 101 mmol/L      CO2 26.0 mmol/L      Calcium 9.6 mg/dL      Total Protein 7.9 g/dL      Albumin 4.5 g/dL      ALT (SGPT) 26 U/L      AST (SGOT) 18 U/L      Alkaline Phosphatase 76 U/L      Total Bilirubin 0.4 mg/dL      Globulin 3.4 gm/dL      A/G Ratio 1.3 g/dL      BUN/Creatinine Ratio 13.8     Anion Gap 12.0 mmol/L      eGFR 95.0 mL/min/1.73     Narrative:      GFR Normal >60  Chronic Kidney Disease <60  Kidney Failure <15      hCG, Quantitative, Pregnancy [[id number]] Collected: 10/18/23 1545    Specimen: Blood from Arm, Right Updated: 10/18/23 1634     HCG Quantitative <0.50 mIU/mL     Narrative:      HCG Ranges by Gestational Age    Females - non-pregnant premenopausal   </= 1mIU/mL HCG  Females - postmenopausal               </= 7mIU/mL HCG    3 Weeks       5.4   -      72 mIU/mL  4 Weeks      10.2   -     708 mIU/mL  5 Weeks       217   -   8,245 mIU/mL  6 Weeks       152   -  32,177 mIU/mL  7 Weeks     4,059   - 153,767 mIU/mL  8 Weeks    31,366   - 149,094 mIU/mL  9 Weeks    59,109   - 135,901 mIU/mL  10 Weeks   44,186   - 170,409 mIU/mL  12 Weeks   27,107   - 201,615 mIU/mL  14 Weeks   24,302   -  93,646  mIU/mL  15 Weeks   12,540   -  69,747 mIU/mL  16 Weeks    8,904   -  55,332 mIU/mL  17 Weeks    8,240   -  51,793 mIU/mL  18 Weeks    9,649   -  55,271 mIU/mL      CBC Auto Differential [[id number]]  (Abnormal) Collected: 10/18/23 1545    Specimen: Blood from Arm, Right Updated: 10/18/23 1551     WBC 12.78 10*3/mm3      RBC 5.12 10*6/mm3      Hemoglobin 14.7 g/dL      Hematocrit 43.7 %      MCV 85.4 fL      MCH 28.7 pg      MCHC 33.6 g/dL      RDW 12.4 %      RDW-SD 38.6 fl      MPV 10.5 fL      Platelets 322 10*3/mm3      Neutrophil % 68.7 %      Lymphocyte % 21.8 %      Monocyte % 6.9 %      Eosinophil % 1.7 %      Basophil % 0.5 %      Immature Grans % 0.4 %      Neutrophils, Absolute 8.77 10*3/mm3      Lymphocytes, Absolute 2.79 10*3/mm3      Monocytes, Absolute 0.88 10*3/mm3      Eosinophils, Absolute 0.22 10*3/mm3      Basophils, Absolute 0.07 10*3/mm3      Immature Grans, Absolute 0.05 10*3/mm3      nRBC 0.0 /100 WBC              Imaging:    MRI Brain Without Contrast    Result Date: 10/18/2023  PROCEDURE: MRI BRAIN WO CONTRAST  COMPARISON: None  INDICATIONS: headahce, visual disturbance      TECHNIQUE: A variety of imaging planes and parameters were utilized for visualization of suspected pathology.  Images were performed without contrast.  FINDINGS:  No intracranial hemorrhage.  No restricted diffusion to suggest acute or subacute infarct.  Brain volume is appropriate.  Gray-white matter differentiation is intact.  Ventricles and sulci are symmetric.  Cerebellar pontine angles are normal appearance.  The pituitary gland is normal appearance.  Craniocervical junction is normal.  Findings of suboptimal suboccipital craniotomy.       Normal brain MRI examination.  Findings of suboccipital craniotomy.      CINTHIA DRAKE MD       Electronically Signed and Approved By: CINTHIA DRAKE MD on 10/18/2023 at 18:56             CT Head Without Contrast    Result Date: 10/18/2023  PROCEDURE: CT HEAD WO CONTRAST  COMPARISON:   [facility], CT, HEAD W/O CONTRAST, 8/18/2020, 0:03. INDICATIONS: PASS OUT FEELING, CHANGE IN VISION, AND NAUSEA X TODAY Head trauma, intracranial arterial injury suspected  PROTOCOL:   Standard imaging protocol performed    RADIATION:   DLP: 1018.2 mGy*cm   MA and/or KV was adjusted to minimize radiation dose.     TECHNIQUE: After obtaining the patient's consent, CT images were obtained without non-ionic intravenous contrast material.  FINDINGS:  CEREBRUM: No edema, hemorrhage, mass, acute infarction, or inappropriate atrophy.  CEREBELLUM: No edema, hemorrhage, mass, acute infarction, or inappropriate atrophy.  BRAINSTEM: No edema, hemorrhage, mass, acute infarction, or inappropriate atrophy.  CSF SPACES: Ventricles, cisterns, and sulci are appropriate for age.  No hydrocephalus, subarachnoid hemorrhage, or mass.  SKULL: No mass or fracture.  Findings of suboccipital craniotomy. SINUSES: Limited views demonstrate no significant mucosal thickening or fluid.  ORBITS: Limited views are unremarkable.  OTHER: Negative.        No acute intracranial abnormality on head CT.  Brain MRI is more sensitive to evaluate for acute or subacute infarct.    CINTHIA DRAKE MD       Electronically Signed and Approved By: CINTHIA DRAKE MD on 10/18/2023 at 16:32                Differential Diagnosis and Discussion:      Headache: Differential diagnosis includes but is not limited to migraine, cluster headache, hypertension, tumor, subarachnoid bleeding, pseudotumor cerebri, temporal arteritis, infections, tension headache, and TMJ syndrome.    All labs were reviewed and interpreted by me.  CT scan radiology impression was interpreted by me.  MRI impression was interpreted by me.     MDM  Number of Diagnoses or Management Options  Other migraine without status migrainosus, not intractable  Diagnosis management comments: Patient presented to the emergency department today for evaluation of migraine with visual disturbance.  CBC  notes white blood cell count 12.78.  CMP is unremarkable.  hCG is unremarkable.  CT head was negative for any acute findings.  MRI is negative for any findings.  On reevaluation patient's migraine is improved and she is appropriate for discharge.       Amount and/or Complexity of Data Reviewed  Clinical lab tests: reviewed and ordered  Tests in the radiology section of CPT®: reviewed and ordered    Risk of Complications, Morbidity, and/or Mortality  Presenting problems: moderate  Diagnostic procedures: moderate  Management options: low    Patient Progress  Patient progress: stable      Consultants/Shared Management Plan:    None    Social Determinants of Health:    Patient is independent, reliable, and has access to care.       Disposition and Care Coordination:    Discharged: The patient is suitable and stable for discharge with no need for consideration of observation or admission.    I have explained the patient´s condition, diagnoses and treatment plan based on the information available to me at this time. I have answered questions and addressed any concerns. The patient has a good  understanding of the patient´s diagnosis, condition, and treatment plan as can be expected at this point. The vital signs have been stable. The patient´s condition is stable and appropriate for discharge from the emergency department.      The patient will pursue further outpatient evaluation with the primary care physician or other designated or consulting physician as outlined in the discharge instructions. They are agreeable to this plan of care and follow-up instructions have been explained in detail. The patient has received these instructions in written format and have expressed an understanding of the discharge instructions. The patient is aware that any significant change in condition or worsening of symptoms should prompt an immediate return to this or the closest emergency department or call to 911.  I have explained  discharge medications and the need for follow up with the patient/caretakers. This was also printed in the discharge instructions. Patient was discharged with the following medications and follow up:      Medication List      No changes were made to your prescriptions during this visit.      Janina Hardin APRN  [address] [id number] [phone]             Final diagnoses:   Other migraine without status migrainosus, not intractable        ED Disposition       ED Disposition   Discharge    Condition   Stable    Comment   --               This medical record created using voice recognition software.             Gerard Skinner PA-C  10/18/23 1930

## 2023-10-23 DIAGNOSIS — F32.A ANXIETY AND DEPRESSION: ICD-10-CM

## 2023-10-23 DIAGNOSIS — F41.9 ANXIETY AND DEPRESSION: ICD-10-CM

## 2023-10-23 PROBLEM — Z86.69 HISTORY OF MIGRAINE WITH AURA: Status: ACTIVE | Noted: 2022-06-27

## 2023-10-23 RX ORDER — DULOXETIN HYDROCHLORIDE 20 MG/1
40 CAPSULE, DELAYED RELEASE ORAL DAILY
Qty: 60 CAPSULE | Refills: 2 | Status: SHIPPED | OUTPATIENT
Start: 2023-10-23

## 2023-10-23 NOTE — PROGRESS NOTES
"Well Woman Visit    CC: Scheduled annual well gyn visit  Chief Complaint   Patient presents with    Gynecologic Exam       HPI:   27 y.o.   Social History     Substance and Sexual Activity   Sexual Activity Yes    Partners: Male    Birth control/protection: I.U.D.    Comment: [facility] 2022     Office Visit with Indira Ruiz, DO (2022)    IGP,rfx Aptima HPV All Pth (2022 10:56) Pap smear only negative    Hx PCOS  Menses:   q 2-3mo, lasts 1-2 days, just spotting   Occas intermittent pelvic cramping, short lived, none now.    Vag dc for a couple months, random.  No STD concerns.    Pt has no complaints today.    PCP: does manage PMHx and preventative labs  History: PMHx, Meds, Allergies, PSHx, Social Hx, and POBHx all reviewed and updated.    PHYSICAL EXAM:  /80   Pulse 82   Ht 162.6 cm (64.02\")   Wt 98 kg (216 lb)   BMI 37.06 kg/m²  Not found.  General- NAD, alert and oriented, appropriate  Psych- Normal mood, good memory  Neck- No masses, no thyroid enlargement  CV- Regular rhythm, no murmurs  Resp- CTA to bases, no wheezes  Abdomen- Soft, non distended, non tender, no masses    Breast left-  Bilaterally symmetrical, no masses, non tender, no nipple discharge, no axillary or supraclavicular nodes palpable.    Breast right- Bilaterally symmetrical, no masses, non tender, no nipple discharge, no axillary or supraclavicular nodes palpable.      External genitalia- Normal female, no lesions  Urethra/meatus- Normal, no masses, non tender  Bladder- Normal, no masses, non tender  Vagina- Normal, no atrophy, no lesions, no abnormal  odor or discharge.  Prolapse : none noted, not examined with split speculum to delineate  Cvx- Normal, no lesions, no discharge, No cervical motion tenderness, IUD strings visable 2cm  Uterus- Normal size, shape & consistency.  Non tender, mobile, & no prolapse  Adnexa- No mass, non tender  Anus/Rectum/Perineum- Not performed    Lymphatic- No palpable neck, axillary, " or groin nodes  Ext- No edema, no cyanosis    Skin- No lesions, no rashes, no acanthosis nigricans      ASSESSMENT and PLAN:    Diagnoses and all orders for this visit:    1. Well woman exam (Primary)    2. IUD check up    3. Vaginal discharge  -     Gardnerella vaginalis, Trichomonas vaginalis, Candida albicans, DNA - Swab, Vagina    4. Hx of PCOS (polycystic ovarian syndrome)  Overview:  By US polycystic ovaries and chronic anovulation      5. Family history of ovarian cancer  Overview:  Genetic screen sent for testing options 10/24/2023, pt agrees to testing      6. Family history of colon cancer        Preventative:  BREAST HEALTH- Monthly self breast exam importance and how to reviewed. MMG and/or MRI (prn) reviewed per society guidelines and her individual history. Screen: Not medically needed  CERVICAL CANCER Screening- Reviewed current ASCCP guidelines for screening w and wo cotest HPV, age specific.  Screen: Already up to date  COLON CANCER Screening- Reviewed current medical society guidelines and options.  Screen:  Already up to date  Importance of WEIGHT MANAGEMENT, nutrition, and exercise reviewed  BONE HEALTH- Reviewed current medical society guidelines and options for testing, calcium and vit D intake.  Weight bearing exercise.  DEXA: Not medically needed  VACCINATIONS Recommended: Covid vaccine, Flu annually, Gardisil/HPV vaccine (up to 44yo), Tdap [id number].  Importance discussed, risk being unvaccinated reviewed.  Questions answered  Smoking status- NON SMOKER  Follow up PCP/Specialist PMHx and/or Labs  HEREDITARY CANCER SCREEN : Counseled and evaluated for hereditary cancer testing. Testing accepted. Patients information and Fhx will be sent to genetic counselor to review and discuss with patient options for testing if she qualifies.   She is to contact our office if she has not heard from the genetic counselor in the next 2 weeks.       She understands the importance of having any ordered tests to  be performed in a timely fashion.  The risks of not performing them include, but are not limited to, advanced cancer stages, bone loss from osteoporosis and/or subsequent increase in morbidity and/or mortality.  She is encouraged to review her results online and/or contact or office if she has questions.     Follow Up:  Return in about 1 year (around 10/24/2024) for WWE.            Indira Ruiz, DO  10/24/2023    [facility]  [facility] [place] [address]  Dept: [phone]  Dept Fax: [phone]  Loc: [phone]  Loc Fax: [phone]

## 2023-10-24 ENCOUNTER — OFFICE VISIT (OUTPATIENT)
Dept: OBSTETRICS AND GYNECOLOGY | Facility: CLINIC | Age: 28
End: 2023-10-24
Payer: COMMERCIAL

## 2023-10-24 VITALS
HEART RATE: 82 BPM | WEIGHT: 216 LBS | SYSTOLIC BLOOD PRESSURE: 136 MMHG | DIASTOLIC BLOOD PRESSURE: 80 MMHG | HEIGHT: 64 IN | BODY MASS INDEX: 36.88 KG/M2

## 2023-10-24 DIAGNOSIS — Z80.41 FAMILY HISTORY OF OVARIAN CANCER: ICD-10-CM

## 2023-10-24 DIAGNOSIS — Z01.419 WELL WOMAN EXAM: Primary | ICD-10-CM

## 2023-10-24 DIAGNOSIS — E28.2 PCOS (POLYCYSTIC OVARIAN SYNDROME): ICD-10-CM

## 2023-10-24 DIAGNOSIS — N89.8 VAGINAL DISCHARGE: ICD-10-CM

## 2023-10-24 DIAGNOSIS — Z80.0 FAMILY HISTORY OF COLON CANCER: ICD-10-CM

## 2023-10-24 DIAGNOSIS — Z30.431 IUD CHECK UP: ICD-10-CM

## 2023-10-24 PROCEDURE — 87510 GARDNER VAG DNA DIR PROBE: CPT | Performed by: OBSTETRICS & GYNECOLOGY

## 2023-10-24 PROCEDURE — 87480 CANDIDA DNA DIR PROBE: CPT | Performed by: OBSTETRICS & GYNECOLOGY

## 2023-10-24 PROCEDURE — 87660 TRICHOMONAS VAGIN DIR PROBE: CPT | Performed by: OBSTETRICS & GYNECOLOGY

## 2023-12-15 ENCOUNTER — TELEPHONE (OUTPATIENT)
Dept: GASTROENTEROLOGY | Facility: CLINIC | Age: 28
End: 2023-12-15

## 2023-12-15 NOTE — TELEPHONE ENCOUNTER
Attempted to contact Mima Wolfe 1995 regarding the appointment no show with ANDREA Machado on 12/15/23 @ 2:15 pm. Patient is aware that there is a 24-hour cancellation policy and understands that a no-show letter will be mailed to them at the address on file.Left message on VM to call back if she wishes to reschedule

## 2024-01-02 ENCOUNTER — OFFICE VISIT (OUTPATIENT)
Dept: OBSTETRICS AND GYNECOLOGY | Facility: CLINIC | Age: 29
End: 2024-01-02
Payer: COMMERCIAL

## 2024-01-02 VITALS
DIASTOLIC BLOOD PRESSURE: 75 MMHG | HEART RATE: 76 BPM | SYSTOLIC BLOOD PRESSURE: 132 MMHG | WEIGHT: 229 LBS | HEIGHT: 64 IN | BODY MASS INDEX: 39.09 KG/M2

## 2024-01-02 DIAGNOSIS — Z30.432 ENCOUNTER FOR REMOVAL OF INTRAUTERINE CONTRACEPTIVE DEVICE (IUD): Primary | ICD-10-CM

## 2024-01-02 PROCEDURE — 58301 REMOVE INTRAUTERINE DEVICE: CPT | Performed by: OBSTETRICS & GYNECOLOGY

## 2024-01-02 NOTE — PROGRESS NOTES
IUD Removal    Mima Wolfe  Date of procedure:  1/2/2024    Risks and benefits discussed? yes  All questions answered? yes  Consents given by The patient  Written consent obtained? yes  Reason for removal: pain    Local anesthesia used:  no    Procedure documentation:    A speculum was placed in order to view the cervix.  A tenaculum did not need to be placed on the anterior cervical lip.  Cervical dilation did not need to be performed in order to access the string.  The IUD string was easily seen.  The string was grasped and the IUD was removed without difficulty.  The IUD did not appear to be adherent to the uterine cavity. It was removed intact.    She tolerated the procedure without any difficulty.     Post procedure instructions: Patient notified to call with heavy bleeding, fever or increasing pain. Declines bc. Pnv daily.     Follow up Return for Annual physical.

## 2024-01-05 DIAGNOSIS — F32.A ANXIETY AND DEPRESSION: ICD-10-CM

## 2024-01-05 DIAGNOSIS — F41.9 ANXIETY AND DEPRESSION: ICD-10-CM

## 2024-01-05 RX ORDER — DULOXETIN HYDROCHLORIDE 20 MG/1
40 CAPSULE, DELAYED RELEASE ORAL DAILY
Qty: 60 CAPSULE | Refills: 2 | Status: SHIPPED | OUTPATIENT
Start: 2024-01-05

## 2024-01-25 ENCOUNTER — LAB (OUTPATIENT)
Dept: LAB | Facility: HOSPITAL | Age: 29
End: 2024-01-25
Payer: COMMERCIAL

## 2024-01-25 DIAGNOSIS — N92.6 LATE MENSES: Primary | ICD-10-CM

## 2024-01-25 DIAGNOSIS — N92.6 LATE MENSES: ICD-10-CM

## 2024-01-25 LAB — HCG INTACT+B SERPL-ACNC: <0.5 MIU/ML

## 2024-01-25 PROCEDURE — 36415 COLL VENOUS BLD VENIPUNCTURE: CPT

## 2024-01-25 PROCEDURE — 84702 CHORIONIC GONADOTROPIN TEST: CPT

## 2024-05-07 ENCOUNTER — LAB (OUTPATIENT)
Dept: LAB | Facility: HOSPITAL | Age: 29
End: 2024-05-07
Payer: COMMERCIAL

## 2024-05-07 ENCOUNTER — OFFICE VISIT (OUTPATIENT)
Dept: FAMILY MEDICINE CLINIC | Facility: CLINIC | Age: 29
End: 2024-05-07
Payer: COMMERCIAL

## 2024-05-07 VITALS
BODY MASS INDEX: 37.76 KG/M2 | WEIGHT: 221.2 LBS | SYSTOLIC BLOOD PRESSURE: 111 MMHG | DIASTOLIC BLOOD PRESSURE: 69 MMHG | OXYGEN SATURATION: 96 % | HEIGHT: 64 IN | HEART RATE: 76 BPM

## 2024-05-07 DIAGNOSIS — Z13.220 SCREENING FOR LIPID DISORDERS: ICD-10-CM

## 2024-05-07 DIAGNOSIS — F32.A ANXIETY AND DEPRESSION: ICD-10-CM

## 2024-05-07 DIAGNOSIS — F41.9 ANXIETY AND DEPRESSION: ICD-10-CM

## 2024-05-07 DIAGNOSIS — Z13.29 SCREENING FOR THYROID DISORDER: ICD-10-CM

## 2024-05-07 DIAGNOSIS — Z00.00 ANNUAL PHYSICAL EXAM: Primary | ICD-10-CM

## 2024-05-07 DIAGNOSIS — E66.09 CLASS 2 OBESITY DUE TO EXCESS CALORIES WITHOUT SERIOUS COMORBIDITY WITH BODY MASS INDEX (BMI) OF 37.0 TO 37.9 IN ADULT: ICD-10-CM

## 2024-05-07 DIAGNOSIS — Z00.00 ANNUAL PHYSICAL EXAM: ICD-10-CM

## 2024-05-07 DIAGNOSIS — Z76.89 ENCOUNTER TO ESTABLISH CARE: ICD-10-CM

## 2024-05-07 LAB
ALBUMIN SERPL-MCNC: 4.4 G/DL (ref 3.5–5.2)
ALBUMIN/GLOB SERPL: 1.7 G/DL
ALP SERPL-CCNC: 70 U/L (ref 39–117)
ALT SERPL W P-5'-P-CCNC: 27 U/L (ref 1–33)
ANION GAP SERPL CALCULATED.3IONS-SCNC: 8 MMOL/L (ref 5–15)
AST SERPL-CCNC: 18 U/L (ref 1–32)
BASOPHILS # BLD AUTO: 0.08 10*3/MM3 (ref 0–0.2)
BASOPHILS NFR BLD AUTO: 0.8 % (ref 0–1.5)
BILIRUB SERPL-MCNC: 0.3 MG/DL (ref 0–1.2)
BUN SERPL-MCNC: 14 MG/DL (ref 6–20)
BUN/CREAT SERPL: 16.9 (ref 7–25)
CALCIUM SPEC-SCNC: 9.3 MG/DL (ref 8.6–10.5)
CHLORIDE SERPL-SCNC: 104 MMOL/L (ref 98–107)
CHOLEST SERPL-MCNC: 142 MG/DL (ref 0–200)
CO2 SERPL-SCNC: 27 MMOL/L (ref 22–29)
CREAT SERPL-MCNC: 0.83 MG/DL (ref 0.57–1)
DEPRECATED RDW RBC AUTO: 41.3 FL (ref 37–54)
EGFRCR SERPLBLD CKD-EPI 2021: 99.9 ML/MIN/1.73
EOSINOPHIL # BLD AUTO: 0.28 10*3/MM3 (ref 0–0.4)
EOSINOPHIL NFR BLD AUTO: 2.8 % (ref 0.3–6.2)
ERYTHROCYTE [DISTWIDTH] IN BLOOD BY AUTOMATED COUNT: 13 % (ref 12.3–15.4)
GLOBULIN UR ELPH-MCNC: 2.6 GM/DL
GLUCOSE SERPL-MCNC: 94 MG/DL (ref 65–99)
HCT VFR BLD AUTO: 43.6 % (ref 34–46.6)
HDLC SERPL-MCNC: 36 MG/DL (ref 40–60)
HGB BLD-MCNC: 14.1 G/DL (ref 12–15.9)
IMM GRANULOCYTES # BLD AUTO: 0.02 10*3/MM3 (ref 0–0.05)
IMM GRANULOCYTES NFR BLD AUTO: 0.2 % (ref 0–0.5)
LDLC SERPL CALC-MCNC: 86 MG/DL (ref 0–100)
LDLC/HDLC SERPL: 2.34 {RATIO}
LYMPHOCYTES # BLD AUTO: 3.1 10*3/MM3 (ref 0.7–3.1)
LYMPHOCYTES NFR BLD AUTO: 31.3 % (ref 19.6–45.3)
MCH RBC QN AUTO: 28.3 PG (ref 26.6–33)
MCHC RBC AUTO-ENTMCNC: 32.3 G/DL (ref 31.5–35.7)
MCV RBC AUTO: 87.6 FL (ref 79–97)
MONOCYTES # BLD AUTO: 0.82 10*3/MM3 (ref 0.1–0.9)
MONOCYTES NFR BLD AUTO: 8.3 % (ref 5–12)
NEUTROPHILS NFR BLD AUTO: 5.62 10*3/MM3 (ref 1.7–7)
NEUTROPHILS NFR BLD AUTO: 56.6 % (ref 42.7–76)
NRBC BLD AUTO-RTO: 0 /100 WBC (ref 0–0.2)
PLATELET # BLD AUTO: 321 10*3/MM3 (ref 140–450)
PMV BLD AUTO: 11.5 FL (ref 6–12)
POTASSIUM SERPL-SCNC: 4.4 MMOL/L (ref 3.5–5.2)
PROT SERPL-MCNC: 7 G/DL (ref 6–8.5)
RBC # BLD AUTO: 4.98 10*6/MM3 (ref 3.77–5.28)
SODIUM SERPL-SCNC: 139 MMOL/L (ref 136–145)
T4 FREE SERPL-MCNC: 1.54 NG/DL (ref 0.93–1.7)
TRIGL SERPL-MCNC: 108 MG/DL (ref 0–150)
TSH SERPL DL<=0.05 MIU/L-ACNC: 3.41 UIU/ML (ref 0.27–4.2)
VLDLC SERPL-MCNC: 20 MG/DL (ref 5–40)
WBC NRBC COR # BLD AUTO: 9.92 10*3/MM3 (ref 3.4–10.8)

## 2024-05-07 PROCEDURE — 99395 PREV VISIT EST AGE 18-39: CPT

## 2024-05-07 PROCEDURE — 80061 LIPID PANEL: CPT

## 2024-05-07 PROCEDURE — 84439 ASSAY OF FREE THYROXINE: CPT

## 2024-05-07 PROCEDURE — 99214 OFFICE O/P EST MOD 30 MIN: CPT

## 2024-05-07 PROCEDURE — 80050 GENERAL HEALTH PANEL: CPT

## 2024-05-07 PROCEDURE — 36415 COLL VENOUS BLD VENIPUNCTURE: CPT

## 2024-05-07 RX ORDER — BUPROPION HYDROCHLORIDE 150 MG/1
150 TABLET ORAL DAILY
Qty: 60 TABLET | Refills: 0 | Status: SHIPPED | OUTPATIENT
Start: 2024-05-07

## 2024-05-31 ENCOUNTER — TRANSCRIBE ORDERS (OUTPATIENT)
Dept: ADMINISTRATIVE | Facility: HOSPITAL | Age: 29
End: 2024-05-31
Payer: COMMERCIAL

## 2024-05-31 DIAGNOSIS — G93.2 BENIGN INTRACRANIAL HYPERTENSION: Primary | ICD-10-CM

## 2024-06-19 ENCOUNTER — OFFICE VISIT (OUTPATIENT)
Dept: FAMILY MEDICINE CLINIC | Facility: CLINIC | Age: 29
End: 2024-06-19
Payer: COMMERCIAL

## 2024-06-19 VITALS
BODY MASS INDEX: 37.18 KG/M2 | HEART RATE: 58 BPM | WEIGHT: 217.8 LBS | OXYGEN SATURATION: 98 % | SYSTOLIC BLOOD PRESSURE: 112 MMHG | DIASTOLIC BLOOD PRESSURE: 67 MMHG | HEIGHT: 64 IN

## 2024-06-19 DIAGNOSIS — E66.09 CLASS 2 OBESITY DUE TO EXCESS CALORIES WITHOUT SERIOUS COMORBIDITY WITH BODY MASS INDEX (BMI) OF 37.0 TO 37.9 IN ADULT: ICD-10-CM

## 2024-06-19 DIAGNOSIS — R11.0 NAUSEA: ICD-10-CM

## 2024-06-19 DIAGNOSIS — F41.9 ANXIETY AND DEPRESSION: Primary | ICD-10-CM

## 2024-06-19 DIAGNOSIS — F32.A ANXIETY AND DEPRESSION: Primary | ICD-10-CM

## 2024-06-19 PROCEDURE — 99214 OFFICE O/P EST MOD 30 MIN: CPT

## 2024-06-19 RX ORDER — OMEPRAZOLE 40 MG/1
40 CAPSULE, DELAYED RELEASE ORAL DAILY
COMMUNITY

## 2024-06-19 RX ORDER — BUPROPION HYDROCHLORIDE 150 MG/1
300 TABLET ORAL DAILY
Qty: 60 TABLET | Refills: 1 | Status: SHIPPED | OUTPATIENT
Start: 2024-06-19

## 2024-06-19 RX ORDER — ONDANSETRON 4 MG/1
4 TABLET, FILM COATED ORAL EVERY 8 HOURS PRN
Qty: 15 TABLET | Refills: 1 | Status: SHIPPED | OUTPATIENT
Start: 2024-06-19

## 2024-06-19 NOTE — PROGRESS NOTES
Chief Complaint  Anxiety, Depression, and Obesity    SUBJECTIVE  Mima Wolfe presents to [facility]    History of Present Illness  Patient is 28-year-old female who presents today for 6 week follow-up on chronic conditions to include anxiety, depression, obesity.  Pt started Wellbutrin 150 mg and states that her depression is better, but anxiety is not improved. She has also started taking omeprazole 40 mg for heartburn that started after she began the new medication which provides relief. She was unable to start Saxenda due to cost but has noticed the Wellbutrin has curbed her appetite some. She is also requesting Zofran for nausea. States she gets car sick and often has random bouts of nausea.       Past Medical History:   Diagnosis Date    Anxiety     Chiari malformation type I     Cholelithiasis     Depression     HSV serum positive I and II     Ingrown toenail     Migraine WITH aura     Ovarian cyst     Polycystic ovary syndrome       Family History   Problem Relation Age of Onset    Diabetes Father         borderline    Hypertension Father     Fibromyalgia Mother     Fibromyalgia Sister     Colon cancer Maternal Aunt 40    Colon cancer Maternal Uncle 30    Ovarian cancer Cousin 25    Malig Hyperthermia Neg Hx     Uterine cancer Neg Hx     Breast cancer Neg Hx     Deep vein thrombosis Neg Hx     Pulmonary embolism Neg Hx       Past Surgical History:   Procedure Laterality Date    CERVICAL CHIARI DECOMPRESSION POSTERIOR  10/03/2017    CHOLECYSTECTOMY      COLONOSCOPY N/A 11/15/2021    Procedure: COLONOSCOPY with biopsy;  Surgeon: Khari Carlson MD;  Location: [facility] ENDOSCOPY;  Service: Gastroenterology;  Laterality: N/A;  normal colon    ENDOSCOPY N/A 11/15/2021    Procedure: ESOPHAGOGASTRODUODENOSCOPY;  Surgeon: Khari Carlson MD;  Location: [facility] ENDOSCOPY;  Service: Gastroenterology;  Laterality: N/A;  normal EGD    EYE SURGERY      x2    WISDOM TOOTH  "EXTRACTION          Current Outpatient Medications:     buPROPion XL (Wellbutrin XL) 150 MG 24 hr tablet, Take 2 tablets by mouth Daily., Disp: 60 tablet, Rfl: 1    omeprazole (priLOSEC) 40 MG capsule, Take 1 capsule by mouth Daily., Disp: , Rfl:     ondansetron (Zofran) 4 MG tablet, Take 1 tablet by mouth Every 8 (Eight) Hours As Needed for Nausea., Disp: 15 tablet, Rfl: 1    OBJECTIVE  Vital Signs:   /67 (BP Location: Left arm, Patient Position: Sitting, Cuff Size: Large Adult)   Pulse 58   Ht 162.6 cm (64\")   Wt 98.8 kg (217 lb 12.8 oz)   SpO2 98%   BMI 37.39 kg/m²    Estimated body mass index is 37.39 kg/m² as calculated from the following:    Height as of this encounter: 162.6 cm (64\").    Weight as of this encounter: 98.8 kg (217 lb 12.8 oz).     Wt Readings from Last 3 Encounters:   06/19/24 98.8 kg (217 lb 12.8 oz)   05/07/24 100 kg (221 lb 3.2 oz)   01/02/24 104 kg (229 lb)     BP Readings from Last 3 Encounters:   06/19/24 112/67   05/07/24 111/69   01/02/24 132/75       Physical Exam  Vitals reviewed.   Constitutional:       General: She is not in acute distress.     Appearance: She is not ill-appearing.   HENT:      Head: Normocephalic and atraumatic.   Eyes:      Conjunctiva/sclera: Conjunctivae normal.   Cardiovascular:      Rate and Rhythm: Normal rate and regular rhythm.      Heart sounds: Normal heart sounds.   Pulmonary:      Effort: Pulmonary effort is normal.      Breath sounds: Normal breath sounds.   Skin:     General: Skin is warm and dry.   Neurological:      Mental Status: She is alert and oriented to person, place, and time.   Psychiatric:         Mood and Affect: Mood normal.         Behavior: Behavior normal.         Thought Content: Thought content normal.         Judgment: Judgment normal.          Result Review    Common labs          10/18/2023    15:45 5/7/2024    07:43   Common Labs   Glucose 101  94    BUN 12  14    Creatinine 0.87  0.83    Sodium 139  139    Potassium " 3.9  4.4    Chloride 101  104    Calcium 9.6  9.3    Albumin 4.5  4.4    Total Bilirubin 0.4  0.3    Alkaline Phosphatase 76  70    AST (SGOT) 18  18    ALT (SGPT) 26  27    WBC 12.78  9.92    Hemoglobin 14.7  14.1    Hematocrit 43.7  43.6    Platelets 322  321    Total Cholesterol  142    Triglycerides  108    HDL Cholesterol  36    LDL Cholesterol   86        No Images in the past 120 days found..      The above data has been reviewed by ANDREA Hayward 06/19/2024 06:47 EDT.          Patient Care Team:  Tamera Cruz APRN as PCP - General (Nurse Practitioner)            ASSESSMENT & PLAN    Diagnoses and all orders for this visit:    1. Anxiety and depression (Primary)  Comments:  improving but needs better control, increase wellbutrin to 300 daily, follow up in 6 weeks  Orders:  -     buPROPion XL (Wellbutrin XL) 150 MG 24 hr tablet; Take 2 tablets by mouth Daily.  Dispense: 60 tablet; Refill: 1    2. Nausea  Comments:  sent in zofran for PRN use  Overview:  Added automatically from request for surgery [id number]    Orders:  -     ondansetron (Zofran) 4 MG tablet; Take 1 tablet by mouth Every 8 (Eight) Hours As Needed for Nausea.  Dispense: 15 tablet; Refill: 1    3. Class 2 obesity due to excess calories without serious comorbidity with body mass index (BMI) of 37.0 to 37.9 in adult  Comments:  increase wellbutrin, work on diet and exercise         Tobacco Use: Low Risk  (6/19/2024)    Patient History     Smoking Tobacco Use: Never     Smokeless Tobacco Use: Never     Passive Exposure: Past       Follow Up     Return in about 6 weeks (around 7/31/2024) for depression/anxiety.      Patient was given instructions and counseling regarding her condition or for health maintenance advice. Please see specific information pulled into the AVS if appropriate.   I have reviewed information obtained and documented by others and I have confirmed the accuracy of this documented note.    ANDREA Hayward

## 2024-07-03 ENCOUNTER — TELEPHONE (OUTPATIENT)
Dept: FAMILY MEDICINE CLINIC | Facility: CLINIC | Age: 29
End: 2024-07-03
Payer: COMMERCIAL

## 2024-07-03 RX ORDER — BUPROPION HYDROCHLORIDE 300 MG/1
300 TABLET ORAL DAILY
Qty: 90 TABLET | Refills: 0 | Status: SHIPPED | OUTPATIENT
Start: 2024-07-03

## 2024-07-03 NOTE — TELEPHONE ENCOUNTER
Caller: Mima Wolfe    Relationship: Self    Best call back number: [phone]    What medication are you requesting: WELLBUTRIN 300 MG.     If a prescription is needed, what is your preferred pharmacy and phone number: [id number] - [place], [place] - [address] AT [facility] [address] - [phone]  - [phone] FX     Additional notes: PHARMACY STATED THAT THE INSURANCE WON'T PAY  MG. PLEASE CALL AND ADVISE.

## 2024-07-30 NOTE — PROGRESS NOTES
Chief Complaint  Anxiety and Depression    SUBJECTIVE  Mima Wolfe presents to [facility]    You have chosen to receive care through a telehealth visit.  Do you consent to use a video/audio connection for your medical care today? Yes    Provider location- office- [address] [place] [address] [address] [place] [id number]    Patient location- [address] [place] [address]        History of Present Illness  Patient is 28-year-old female who presents today via telehealth for 6 week follow-up on depression and anxiety. Pt is on Wellbutrin 300 mg. Since increasing medication she has noticed improvement in her mood. She is still having some anxiety however. She has never tried Buspar in the past but is open to trying it. Otherwise doing well.       Past Medical History:   Diagnosis Date    Anxiety     Chiari malformation type I     Cholelithiasis     Depression     HSV serum positive I and II     Ingrown toenail     Migraine WITH aura     Ovarian cyst     Polycystic ovary syndrome       Family History   Problem Relation Age of Onset    Diabetes Father         borderline    Hypertension Father     Fibromyalgia Mother     Fibromyalgia Sister     Colon cancer Maternal Aunt 40    Colon cancer Maternal Uncle 30    Ovarian cancer Cousin 25    Malig Hyperthermia Neg Hx     Uterine cancer Neg Hx     Breast cancer Neg Hx     Deep vein thrombosis Neg Hx     Pulmonary embolism Neg Hx       Past Surgical History:   Procedure Laterality Date    CERVICAL CHIARI DECOMPRESSION POSTERIOR  10/03/2017    CHOLECYSTECTOMY      COLONOSCOPY N/A 11/15/2021    Procedure: COLONOSCOPY with biopsy;  Surgeon: Khari Carlson MD;  Location: [facility] ENDOSCOPY;  Service: Gastroenterology;  Laterality: N/A;  normal colon    ENDOSCOPY N/A 11/15/2021    Procedure: ESOPHAGOGASTRODUODENOSCOPY;  Surgeon: Khari Carlson MD;  Location: [facility] ENDOSCOPY;  Service: Gastroenterology;  Laterality: N/A;  normal EGD  "   EYE SURGERY      x2    WISDOM TOOTH EXTRACTION          Current Outpatient Medications:     buPROPion XL (Wellbutrin XL) 300 MG 24 hr tablet, Take 1 tablet by mouth Daily., Disp: 90 tablet, Rfl: 1    azithromycin (Zithromax Z-Carson) 250 MG tablet, Take 2 tablets by mouth on day 1, then 1 tablet daily on days 2-5, Disp: 6 tablet, Rfl: 0    busPIRone (BUSPAR) 7.5 MG tablet, Take 1 tablet by mouth 2 (Two) Times a Day., Disp: 60 tablet, Rfl: 1    ipratropium (ATROVENT) 0.03 % nasal spray, 2 sprays into the nostril(s) as directed by provider 3 (Three) Times a Day., Disp: 30 mL, Rfl: 0    omeprazole (priLOSEC) 40 MG capsule, Take 1 capsule by mouth Daily., Disp: , Rfl:     ondansetron (Zofran) 4 MG tablet, Take 1 tablet by mouth Every 8 (Eight) Hours As Needed for Nausea., Disp: 15 tablet, Rfl: 1    OBJECTIVE  Vital Signs:   LMP 05/26/2024 (Exact Date)    Estimated body mass index is 37.39 kg/m² as calculated from the following:    Height as of 6/19/24: 162.6 cm (64\").    Weight as of 6/19/24: 98.8 kg (217 lb 12.8 oz).     Wt Readings from Last 3 Encounters:   06/19/24 98.8 kg (217 lb 12.8 oz)   05/07/24 100 kg (221 lb 3.2 oz)   01/02/24 104 kg (229 lb)     BP Readings from Last 3 Encounters:   07/08/24 126/70   06/19/24 112/67   05/07/24 111/69       Physical Exam  Vitals reviewed.   Constitutional:       General: She is not in acute distress.  HENT:      Head: Normocephalic and atraumatic.   Eyes:      Conjunctiva/sclera: Conjunctivae normal.   Pulmonary:      Effort: Pulmonary effort is normal.   Neurological:      Mental Status: She is alert and oriented to person, place, and time.   Psychiatric:         Mood and Affect: Mood normal.         Behavior: Behavior normal.         Thought Content: Thought content normal.         Judgment: Judgment normal.          Result Review    Common labs          10/18/2023    15:45 5/7/2024    07:43   Common Labs   Glucose 101  94    BUN 12  14    Creatinine 0.87  0.83    Sodium 139 "  139    Potassium 3.9  4.4    Chloride 101  104    Calcium 9.6  9.3    Albumin 4.5  4.4    Total Bilirubin 0.4  0.3    Alkaline Phosphatase 76  70    AST (SGOT) 18  18    ALT (SGPT) 26  27    WBC 12.78  9.92    Hemoglobin 14.7  14.1    Hematocrit 43.7  43.6    Platelets 322  321    Total Cholesterol  142    Triglycerides  108    HDL Cholesterol  36    LDL Cholesterol   86        No Images in the past 120 days found..      The above data has been reviewed by ANDREA Hayward 07/31/2024 16:16 EDT.          Patient Care Team:  Tamera Cruz APRN as PCP - General (Nurse Practitioner)            ASSESSMENT & PLAN    Diagnoses and all orders for this visit:    1. Anxiety (Primary)  Comments:  start buspar 7.5 mg twice daily, side effects discussed  Orders:  -     busPIRone (BUSPAR) 7.5 MG tablet; Take 1 tablet by mouth 2 (Two) Times a Day.  Dispense: 60 tablet; Refill: 1    2. Anxiety and depression  Comments:  doing good on wellbutrin 300 mg.  Orders:  -     buPROPion XL (Wellbutrin XL) 300 MG 24 hr tablet; Take 1 tablet by mouth Daily.  Dispense: 90 tablet; Refill: 1         Tobacco Use: Low Risk  (7/31/2024)    Patient History     Smoking Tobacco Use: Never     Smokeless Tobacco Use: Never     Passive Exposure: Past       Follow Up     Return in about 1 month (around 8/31/2024).      Patient was given instructions and counseling regarding her condition or for health maintenance advice. Please see specific information pulled into the AVS if appropriate.   I have reviewed information obtained and documented by others and I have confirmed the accuracy of this documented note.    ANDREA Hayward

## 2024-07-31 ENCOUNTER — PATIENT MESSAGE (OUTPATIENT)
Dept: FAMILY MEDICINE CLINIC | Facility: CLINIC | Age: 29
End: 2024-07-31

## 2024-07-31 ENCOUNTER — TELEMEDICINE (OUTPATIENT)
Dept: FAMILY MEDICINE CLINIC | Facility: CLINIC | Age: 29
End: 2024-07-31
Payer: COMMERCIAL

## 2024-07-31 DIAGNOSIS — F32.A ANXIETY AND DEPRESSION: ICD-10-CM

## 2024-07-31 DIAGNOSIS — F41.9 ANXIETY AND DEPRESSION: ICD-10-CM

## 2024-07-31 DIAGNOSIS — F41.9 ANXIETY: ICD-10-CM

## 2024-07-31 DIAGNOSIS — F41.9 ANXIETY: Primary | ICD-10-CM

## 2024-07-31 PROCEDURE — 99214 OFFICE O/P EST MOD 30 MIN: CPT

## 2024-07-31 RX ORDER — BUPROPION HYDROCHLORIDE 300 MG/1
300 TABLET ORAL DAILY
Qty: 90 TABLET | Refills: 1 | Status: SHIPPED | OUTPATIENT
Start: 2024-07-31 | End: 2024-07-31 | Stop reason: SDUPTHER

## 2024-07-31 RX ORDER — BUPROPION HYDROCHLORIDE 300 MG/1
300 TABLET ORAL DAILY
Qty: 90 TABLET | Refills: 1 | Status: SHIPPED | OUTPATIENT
Start: 2024-07-31

## 2024-07-31 RX ORDER — BUSPIRONE HYDROCHLORIDE 7.5 MG/1
7.5 TABLET ORAL 2 TIMES DAILY
Qty: 60 TABLET | Refills: 1 | Status: SHIPPED | OUTPATIENT
Start: 2024-07-31

## 2024-07-31 RX ORDER — BUSPIRONE HYDROCHLORIDE 7.5 MG/1
7.5 TABLET ORAL 2 TIMES DAILY
Qty: 60 TABLET | Refills: 1 | Status: SHIPPED | OUTPATIENT
Start: 2024-07-31 | End: 2024-07-31 | Stop reason: SDUPTHER

## 2024-07-31 NOTE — TELEPHONE ENCOUNTER
From: Mima Wolfe  To: Tamera Cruz  Sent: 7/31/2024 9:51 AM EDT  Subject: RX to new pharmacy     I was wondering if you could re-send my two prescriptions to [facility] in [place], instead? I checked with my insurance and that is the cheapest place I can get it. I didn’t realize how expensive [facility] was compared to them with my insurance plan. Sorry for the trouble. Please and thank you.

## 2024-08-16 ENCOUNTER — LAB (OUTPATIENT)
Dept: OBSTETRICS AND GYNECOLOGY | Facility: CLINIC | Age: 29
End: 2024-08-16
Payer: COMMERCIAL

## 2024-08-16 DIAGNOSIS — Z32.01 POSITIVE PREGNANCY TEST: ICD-10-CM

## 2024-08-16 DIAGNOSIS — Z32.01 POSITIVE PREGNANCY TEST: Primary | ICD-10-CM

## 2024-08-16 LAB — HCG INTACT+B SERPL-ACNC: 626 MIU/ML

## 2024-08-16 PROCEDURE — 84702 CHORIONIC GONADOTROPIN TEST: CPT | Performed by: OBSTETRICS & GYNECOLOGY

## 2024-08-19 ENCOUNTER — LAB (OUTPATIENT)
Dept: LAB | Facility: HOSPITAL | Age: 29
End: 2024-08-19
Payer: COMMERCIAL

## 2024-08-19 DIAGNOSIS — Z32.01 POSITIVE PREGNANCY TEST: ICD-10-CM

## 2024-08-19 LAB — HCG INTACT+B SERPL-ACNC: 2473 MIU/ML

## 2024-08-19 PROCEDURE — 36415 COLL VENOUS BLD VENIPUNCTURE: CPT

## 2024-08-19 PROCEDURE — 84702 CHORIONIC GONADOTROPIN TEST: CPT

## 2024-08-21 NOTE — PROGRESS NOTES
"GYN Visit    Chief Complaint   Patient presents with    Confirmation of Pregnancy       HPI:   28 y.o. LMP: Patient's last menstrual period was 2024 (exact date).     Social History     Substance and Sexual Activity   Sexual Activity Yes    Partners: Male    Birth control/protection: None     hCG, Quantitative, Pregnancy (2024 13:08) beta-hCG rising appropriately, up to 2400+    US today: cwd by 1 day, CRL, no FHR yet, 5+6    LMP: 12Jul, sure, q 30-40d (IUD out in )  N/V: + nausea, has GERD, no change from baseline, some vomiting also baseline  VB: none    PCOS- plan early glucola    Anxiety/depression- stopped meds w +preg test (Wellbutrin and Buspar).  Discussed importance of mental health and current guidelines to use meds if needed.    Serum positive HSV 1 and 2 (cold sores only)- plan prophylaxis    Can't take big pills- rec try w M+Ms, not all pills can be substituted with chewables or small pills    Elev BP- check at home, bring log, if diagnosed with chronic hypertension will do baseline labs and baby aspirin at 12 weeks    Intracranial hypertension suspected- awaiting dx, SHIRLEY Pretty, had CT scan of head planned-recommend patient make neurosurgery aware, and will plan Mfm consult, may require primary  to avoid intracranial pressure    Elev BMI 37- plan APT and growth US    History: PMHx, Meds, Allergies, PSHx, Social Hx, and POBHx all reviewed and updated.    PHYSICAL EXAM:  /83   Pulse 80   Ht 162.6 cm (64\")   Wt 98.4 kg (217 lb)   LMP 2024 (Exact Date)   BMI 37.25 kg/m²   Facility age limit for growth %yomaira is 20 years.   General- NAD, alert and oriented, appropriate  Psych- Normal mood, good memory    ASSESSMENT AND PLAN:  Diagnoses and all orders for this visit:    1. Positive pregnancy test (Primary)  Comments:  Suspect early pregnancy, suspect on the verge of fetal heart rate  Orders:  -     US Ob < 14 Weeks Single or First Gestation; Future    2. Nausea " and vomiting, unspecified vomiting type  -     doxylamine (UNISOM) 25 MG tablet; Take 1 tablet by mouth At Night As Needed for Sleep or Nausea (or anxiety). May take and extra 1/2 tablet PRN persistent nausea or anxiety  Dispense: 30 tablet; Refill: 1  -     vitamin B-6 (PYRIDOXINE) 25 MG tablet; Take 1 tablet by mouth 2 (Two) Times a Day. For nausea in pregnancy  Dispense: 60 tablet; Refill: 1    3. Gastroesophageal reflux disease without esophagitis  Overview:  Added automatically from request for surgery [id number]      4. Difficulty swallowing pills  -     folic acid (FOLVITE) 1 MG tablet; Take 1 tablet by mouth Daily.  Dispense: 90 tablet; Refill: 3    5. Elevated blood pressure reading    6. PCOS (polycystic ovarian syndrome)  Overview:  By US polycystic ovaries and chronic anovulation      7. Intracranial hypertension  Overview:  Suspected, NS follows.      8. Anxiety and depression    9. Obesity in pregnancy, antepartum      FIRST TRIMESTER precautions provided- return to office/ER for pain and/or vaginal bleeding.    Follow Up:  Return in about 1 week (around 8/30/2024) for FU US then 4weeks later new OB.          Indira Ruiz, DO  08/23/2024    [facility]  [facility] [place] [address]  Dept: [phone]  Dept Fax: [phone]  Loc: [phone]  Loc Fax: [phone]

## 2024-08-23 ENCOUNTER — OFFICE VISIT (OUTPATIENT)
Dept: OBSTETRICS AND GYNECOLOGY | Facility: CLINIC | Age: 29
End: 2024-08-23
Payer: COMMERCIAL

## 2024-08-23 VITALS
BODY MASS INDEX: 37.05 KG/M2 | HEIGHT: 64 IN | SYSTOLIC BLOOD PRESSURE: 140 MMHG | HEART RATE: 80 BPM | WEIGHT: 217 LBS | DIASTOLIC BLOOD PRESSURE: 83 MMHG

## 2024-08-23 DIAGNOSIS — F41.9 ANXIETY: ICD-10-CM

## 2024-08-23 DIAGNOSIS — E28.2 PCOS (POLYCYSTIC OVARIAN SYNDROME): ICD-10-CM

## 2024-08-23 DIAGNOSIS — R13.10 DIFFICULTY SWALLOWING PILLS: ICD-10-CM

## 2024-08-23 DIAGNOSIS — Z32.01 POSITIVE PREGNANCY TEST: Primary | ICD-10-CM

## 2024-08-23 DIAGNOSIS — K21.9 GASTROESOPHAGEAL REFLUX DISEASE WITHOUT ESOPHAGITIS: ICD-10-CM

## 2024-08-23 DIAGNOSIS — O99.210 OBESITY IN PREGNANCY, ANTEPARTUM: ICD-10-CM

## 2024-08-23 DIAGNOSIS — R11.2 NAUSEA AND VOMITING, UNSPECIFIED VOMITING TYPE: ICD-10-CM

## 2024-08-23 DIAGNOSIS — R03.0 ELEVATED BLOOD PRESSURE READING: ICD-10-CM

## 2024-08-23 DIAGNOSIS — G93.2 INTRACRANIAL HYPERTENSION: ICD-10-CM

## 2024-08-23 RX ORDER — DIPHENHYDRAMINE HYDROCHLORIDE 25 MG/1
25 CAPSULE ORAL 2 TIMES DAILY
Qty: 60 TABLET | Refills: 1 | Status: SHIPPED | OUTPATIENT
Start: 2024-08-23

## 2024-08-23 RX ORDER — FOLIC ACID 1 MG/1
1 TABLET ORAL DAILY
Qty: 90 TABLET | Refills: 3 | Status: SHIPPED | OUTPATIENT
Start: 2024-08-23

## 2024-08-30 ENCOUNTER — OFFICE VISIT (OUTPATIENT)
Dept: OBSTETRICS AND GYNECOLOGY | Facility: CLINIC | Age: 29
End: 2024-08-30
Payer: COMMERCIAL

## 2024-08-30 VITALS
SYSTOLIC BLOOD PRESSURE: 124 MMHG | WEIGHT: 215 LBS | HEART RATE: 84 BPM | HEIGHT: 64 IN | DIASTOLIC BLOOD PRESSURE: 82 MMHG | BODY MASS INDEX: 36.7 KG/M2

## 2024-08-30 DIAGNOSIS — Z34.00 SUPERVISION OF NORMAL FIRST PREGNANCY, ANTEPARTUM: Primary | ICD-10-CM

## 2024-08-30 PROCEDURE — 80307 DRUG TEST PRSMV CHEM ANLYZR: CPT | Performed by: STUDENT IN AN ORGANIZED HEALTH CARE EDUCATION/TRAINING PROGRAM

## 2024-08-30 PROCEDURE — 87086 URINE CULTURE/COLONY COUNT: CPT | Performed by: STUDENT IN AN ORGANIZED HEALTH CARE EDUCATION/TRAINING PROGRAM

## 2024-08-30 NOTE — PROGRESS NOTES
"GYN Problem Visit -fetal viability    Chief Complaint   Patient presents with    Follow-up     Follow up US viability                HPI  Mima Wolfe is a 28 y.o. female, , who presents for follow-up for fetal viability.   Had ultrasound performed last week which was too early to confirm viability.  No acute complaints.  Denies any concerns for nausea or vomiting or vaginal bleeding.  G1.  Denies any history of chronic hypertension.  History of cholecystectomy.  LMP: 2024      Additional OB/GYN History   Patient's last menstrual period was 2024 (exact date).  Current contraception: contraceptive methods: None  Desires to: do not start contraception  Allergies : Metoclopramide and Retin-a [tretinoin]     The additional following portions of the patient's history were reviewed and updated as appropriate: allergies, current medications, past family history, past medical history, past social history, past surgical history, and problem list.    Review of Systems   Constitutional:  Negative for fatigue and fever.   Respiratory:  Negative for cough and shortness of breath.    Cardiovascular:  Negative for chest pain.   Gastrointestinal:  Negative for abdominal distention, nausea and vomiting.   Genitourinary:  Positive for amenorrhea. Negative for difficulty urinating and dysuria.       I have reviewed and agree with the HPI, ROS, and historical information as entered above. Bib Cuevas MD    Objective   /82   Pulse 84   Ht 162.6 cm (64\")   Wt 97.5 kg (215 lb)   LMP 2024 (Exact Date)   BMI 36.90 kg/m²     Physical Exam  Vitals and nursing note reviewed.   Constitutional:       General: She is not in acute distress.     Appearance: Normal appearance. She is not toxic-appearing.   HENT:      Head: Normocephalic and atraumatic.   Eyes:      Extraocular Movements: Extraocular movements intact.      Conjunctiva/sclera: Conjunctivae normal.   Cardiovascular:      Pulses: Normal pulses. "   Pulmonary:      Effort: Pulmonary effort is normal.   Abdominal:      General: There is no distension.      Palpations: Abdomen is soft.      Tenderness: There is no abdominal tenderness.   Genitourinary:     Comments: deferred  Musculoskeletal:      Cervical back: Normal range of motion.   Skin:     General: Skin is warm and dry.   Neurological:      Mental Status: She is alert and oriented to person, place, and time.   Psychiatric:         Mood and Affect: Mood normal.         Behavior: Behavior normal.         Thought Content: Thought content normal.            Assessment and Plan  Mima Wolfe is a 28 y.o.  presenting for ultrasound for fetal viability.  Ultrasound today with a 6-week 5-day viable single intrauterine pregnancy with estimated due date of 2025.  This is consistent with patient reported last menstrual cycle with a gestational age of 7 weeks and 0 days with an estimated due date of 2025.  Recommendation for continue folate and prenatal vitamin.  Return to office later this month for new OB visit.  Prenatal labs will be drawn today.  Dehydration and miscarriage precautions.  OB packet provided.    Diagnoses and all orders for this visit:    1. Supervision of normal first pregnancy, antepartum (Primary)  -     Urine Culture - Urine, Urine, Clean Catch  -     Urine Drug Screen - Urine, Clean Catch; Future  -     OB Panel With HIV  -     Hemoglobinopathy Fractionation [place]; Future        Counseling:  Calcium/Vit D/PNV  Miscarriage precautions  Dehydration precautions      She understands the importance of having the above orders performed in a timely fashion.  The risks of not performing them include, but are not limited to, cancer and/or subsequent increase in morbidity and/or mortality.  She is encouraged to review her results online and/or contact or office if she has questions.     Follow Up:  Return in about 4 weeks (around 2024).        Bib Cuevas MD  2024

## 2024-08-31 LAB
AMPHET+METHAMPHET UR QL: NEGATIVE
BACTERIA SPEC AEROBE CULT: NORMAL
BARBITURATES UR QL SCN: NEGATIVE
BENZODIAZ UR QL SCN: NEGATIVE
CANNABINOIDS SERPL QL: POSITIVE
COCAINE UR QL: NEGATIVE
FENTANYL UR-MCNC: NEGATIVE NG/ML
METHADONE UR QL SCN: NEGATIVE
OPIATES UR QL: NEGATIVE
OXYCODONE UR QL SCN: NEGATIVE

## 2024-09-04 PROBLEM — O99.320 MARIJUANA USE DURING PREGNANCY: Status: ACTIVE | Noted: 2024-09-04

## 2024-09-04 PROBLEM — F12.90 MARIJUANA USE DURING PREGNANCY: Status: ACTIVE | Noted: 2024-09-04

## 2024-09-18 ENCOUNTER — LAB (OUTPATIENT)
Dept: LAB | Facility: HOSPITAL | Age: 29
End: 2024-09-18
Payer: COMMERCIAL

## 2024-09-18 DIAGNOSIS — Z34.00 SUPERVISION OF NORMAL FIRST PREGNANCY, ANTEPARTUM: ICD-10-CM

## 2024-09-18 LAB
ABO GROUP BLD: NORMAL
BASOPHILS # BLD AUTO: 0.05 10*3/MM3 (ref 0–0.2)
BASOPHILS NFR BLD AUTO: 0.4 % (ref 0–1.5)
BLD GP AB SCN SERPL QL: NEGATIVE
DEPRECATED RDW RBC AUTO: 42.5 FL (ref 37–54)
EOSINOPHIL # BLD AUTO: 0.1 10*3/MM3 (ref 0–0.4)
EOSINOPHIL NFR BLD AUTO: 0.8 % (ref 0.3–6.2)
ERYTHROCYTE [DISTWIDTH] IN BLOOD BY AUTOMATED COUNT: 13 % (ref 12.3–15.4)
HBV SURFACE AG SERPL QL IA: NORMAL
HCT VFR BLD AUTO: 41.5 % (ref 34–46.6)
HCV AB SER QL: NORMAL
HGB BLD-MCNC: 13.5 G/DL (ref 12–15.9)
HIV 1+2 AB+HIV1 P24 AG SERPL QL IA: NORMAL
IMM GRANULOCYTES # BLD AUTO: 0.05 10*3/MM3 (ref 0–0.05)
IMM GRANULOCYTES NFR BLD AUTO: 0.4 % (ref 0–0.5)
LYMPHOCYTES # BLD AUTO: 2.32 10*3/MM3 (ref 0.7–3.1)
LYMPHOCYTES NFR BLD AUTO: 18.4 % (ref 19.6–45.3)
MCH RBC QN AUTO: 28.7 PG (ref 26.6–33)
MCHC RBC AUTO-ENTMCNC: 32.5 G/DL (ref 31.5–35.7)
MCV RBC AUTO: 88.3 FL (ref 79–97)
MONOCYTES # BLD AUTO: 0.92 10*3/MM3 (ref 0.1–0.9)
MONOCYTES NFR BLD AUTO: 7.3 % (ref 5–12)
NEUTROPHILS NFR BLD AUTO: 72.7 % (ref 42.7–76)
NEUTROPHILS NFR BLD AUTO: 9.18 10*3/MM3 (ref 1.7–7)
NRBC BLD AUTO-RTO: 0 /100 WBC (ref 0–0.2)
PLATELET # BLD AUTO: 310 10*3/MM3 (ref 140–450)
PMV BLD AUTO: 11.8 FL (ref 6–12)
RBC # BLD AUTO: 4.7 10*6/MM3 (ref 3.77–5.28)
RH BLD: NEGATIVE
RPR SER QL: NORMAL
WBC NRBC COR # BLD AUTO: 12.62 10*3/MM3 (ref 3.4–10.8)

## 2024-09-18 PROCEDURE — 86803 HEPATITIS C AB TEST: CPT | Performed by: STUDENT IN AN ORGANIZED HEALTH CARE EDUCATION/TRAINING PROGRAM

## 2024-09-18 PROCEDURE — 80081 OBSTETRIC PANEL INC HIV TSTG: CPT | Performed by: STUDENT IN AN ORGANIZED HEALTH CARE EDUCATION/TRAINING PROGRAM

## 2024-09-18 PROCEDURE — 83020 HEMOGLOBIN ELECTROPHORESIS: CPT

## 2024-09-19 ENCOUNTER — OFFICE VISIT (OUTPATIENT)
Dept: OBSTETRICS AND GYNECOLOGY | Facility: CLINIC | Age: 29
End: 2024-09-19
Payer: COMMERCIAL

## 2024-09-19 VITALS
WEIGHT: 215 LBS | HEART RATE: 89 BPM | SYSTOLIC BLOOD PRESSURE: 130 MMHG | BODY MASS INDEX: 36.7 KG/M2 | HEIGHT: 64 IN | DIASTOLIC BLOOD PRESSURE: 79 MMHG

## 2024-09-19 DIAGNOSIS — N89.8 VAGINAL ITCHING: Primary | ICD-10-CM

## 2024-09-19 DIAGNOSIS — R35.0 URINE FREQUENCY: ICD-10-CM

## 2024-09-19 LAB
BILIRUB BLD-MCNC: NEGATIVE MG/DL
GLUCOSE UR STRIP-MCNC: NEGATIVE MG/DL
KETONES UR QL: NEGATIVE
LEUKOCYTE EST, POC: NEGATIVE
NITRITE UR-MCNC: NEGATIVE MG/ML
PH UR: 6 [PH] (ref 5–8)
PROT UR STRIP-MCNC: NEGATIVE MG/DL
RBC # UR STRIP: NEGATIVE /UL
SP GR UR: 1.01 (ref 1–1.03)
UROBILINOGEN UR QL: NORMAL

## 2024-09-19 RX ORDER — CLINDAMYCIN HCL 300 MG
300 CAPSULE ORAL 2 TIMES DAILY
Qty: 14 CAPSULE | Refills: 0 | Status: SHIPPED | OUTPATIENT
Start: 2024-09-19 | End: 2024-09-26

## 2024-09-20 LAB
HGB A MFR BLD ELPH: 97.4 % (ref 96.4–98.8)
HGB A2 MFR BLD ELPH: 2.6 % (ref 1.8–3.2)
HGB F MFR BLD ELPH: 0 % (ref 0–2)
HGB FRACT BLD-IMP: NORMAL
HGB S MFR BLD ELPH: 0 %
RUBV IGG SERPL IA-ACNC: 4.56 INDEX

## 2024-09-21 LAB
A VAGINAE DNA VAG QL NAA+PROBE: ABNORMAL SCORE
BVAB2 DNA VAG QL NAA+PROBE: ABNORMAL SCORE
C ALBICANS DNA VAG QL NAA+PROBE: POSITIVE
C GLABRATA DNA VAG QL NAA+PROBE: NEGATIVE
MEGA1 DNA VAG QL NAA+PROBE: ABNORMAL SCORE

## 2024-09-23 RX ORDER — MICONAZOLE NITRATE 2 %
1 CREAM WITH APPLICATOR VAGINAL NIGHTLY
Qty: 7 G | Refills: 0 | Status: SHIPPED | OUTPATIENT
Start: 2024-09-23 | End: 2024-09-30

## 2024-09-27 ENCOUNTER — INITIAL PRENATAL (OUTPATIENT)
Dept: OBSTETRICS AND GYNECOLOGY | Facility: CLINIC | Age: 29
End: 2024-09-27
Payer: COMMERCIAL

## 2024-09-27 VITALS — BODY MASS INDEX: 37.08 KG/M2 | DIASTOLIC BLOOD PRESSURE: 84 MMHG | WEIGHT: 216 LBS | SYSTOLIC BLOOD PRESSURE: 125 MMHG

## 2024-09-27 DIAGNOSIS — O21.9 NAUSEA AND VOMITING IN PREGNANCY: ICD-10-CM

## 2024-09-27 DIAGNOSIS — Z34.00 SUPERVISION OF NORMAL FIRST PREGNANCY, ANTEPARTUM: Primary | ICD-10-CM

## 2024-09-27 DIAGNOSIS — G93.2 INTRACRANIAL HYPERTENSION: ICD-10-CM

## 2024-09-27 DIAGNOSIS — L29.9 ITCHING: ICD-10-CM

## 2024-09-27 LAB
BILE AC SERPL-SCNC: 6 UMOL/L (ref 0–10)
C TRACH RRNA CVX QL NAA+PROBE: NOT DETECTED
N GONORRHOEA RRNA SPEC QL NAA+PROBE: NOT DETECTED

## 2024-09-27 PROCEDURE — 82239 BILE ACIDS TOTAL: CPT | Performed by: OBSTETRICS & GYNECOLOGY

## 2024-09-27 PROCEDURE — 87591 N.GONORRHOEAE DNA AMP PROB: CPT | Performed by: OBSTETRICS & GYNECOLOGY

## 2024-09-27 PROCEDURE — 87491 CHLMYD TRACH DNA AMP PROBE: CPT | Performed by: OBSTETRICS & GYNECOLOGY

## 2024-09-27 PROCEDURE — 87086 URINE CULTURE/COLONY COUNT: CPT | Performed by: OBSTETRICS & GYNECOLOGY

## 2024-09-27 RX ORDER — ONDANSETRON 4 MG/1
4 TABLET, ORALLY DISINTEGRATING ORAL EVERY 8 HOURS PRN
Qty: 30 TABLET | Refills: 1 | Status: SHIPPED | OUTPATIENT
Start: 2024-09-27

## 2024-09-27 RX ORDER — PROMETHAZINE HYDROCHLORIDE 25 MG/1
25 TABLET ORAL EVERY 6 HOURS PRN
Qty: 30 TABLET | Refills: 1 | Status: SHIPPED | OUTPATIENT
Start: 2024-09-27

## 2024-09-28 LAB — BACTERIA SPEC AEROBE CULT: NORMAL

## 2024-10-03 DIAGNOSIS — Z34.91 FIRST TRIMESTER PREGNANCY: Primary | ICD-10-CM

## 2024-10-07 LAB
CITATION REF LAB TEST: NORMAL
ETHNIC BACKGROUND STATED: NORMAL
GENE DIS ANL CARRIER INTERP-IMP: NORMAL
GENE STUDIED ID: NORMAL
LAB DIRECTOR NAME PROVIDER: NORMAL
Lab: NORMAL
MOL DX INTERP BLD/T QL: NORMAL
REASON FOR REFERRAL (NARRATIVE): NORMAL
RECOMMENDATION PATIENT DOC-IMP: NORMAL
REF LAB TEST METHOD: NORMAL
SERVICE CMNT-IMP: NORMAL
SPECIMEN SOURCE: NORMAL

## 2024-10-23 PROBLEM — Z86.69 HISTORY OF MIGRAINE WITH AURA: Status: RESOLVED | Noted: 2022-06-27 | Resolved: 2024-10-23

## 2024-10-23 PROBLEM — E28.2 PCOS (POLYCYSTIC OVARIAN SYNDROME): Status: RESOLVED | Noted: 2022-04-25 | Resolved: 2024-10-23

## 2024-10-24 NOTE — PROGRESS NOTES
OB FOLLOW UP      Chief Complaint   Patient presents with    Routine Prenatal Visit     Subjective:   No complaints    Objective:  /70   Wt 98 kg (216 lb)   LMP 07/12/2024 (Exact Date)   BMI 37.08 kg/m²  0.454 kg (1 lb) Body mass index is 37.08 kg/m².    See OB flow sheet for fundal height (not performed if US day of OV), FHT, edema, cvx exam if performed, and Upro/Uglu.   Chaperone present during pelvic exam if performed.      Assessment and Plan:  15w0d     Diagnoses and all orders for this visit:    1. Supervision of normal first pregnancy, antepartum (Primary)  Overview:  DEBI finalized: 4/18/24 by LMP and confirmed by 6week US    Optional testing NIPS,CF/SMA,AFP:  CF neg.  SMA neg.  NIPS 9/27 insufficient plasma- redraw next OV.  NIPS 10/25/2024.  Desires AFP     COVID: Recommended   Flu: Recommended   Tdap:  RSV:    Rhogam:  1hr Glucola:  FU TPA w glucola:  ? Desires Sterilization:    Anatomy US: w M[facility] 11/22/24  FU US:    PROBLEM LIST/PLAN:   PCOS- early glucola 10/25/2024   Anxiety/depression- stopped meds w +preg test (Wellbutrin and Buspar).  Previously discussed importance of mental health and current guidelines to use meds if needed.  Stable  Serum positive HSV 1 and 2 (cold sores only)- plan prophylaxis at 34-36weeks  Elev BP early on viability OV- FU wnl.  Checking BP at home and wnl  Intracranial hypertension- awaiting dx, NS Sukhwinder  [facility] consult 9/27/2024   Elev pregravid BMI 37- plan APT and growth US  +THC- Stopped after positive preg test, continue to avoid    Can't take big pills- rec try w M+Ms, not all pills can be substituted with chewables or small pills    Orders:  -     Jose Panorama Prenatal Test: Chromosomes 13, 18, 21, X & Y: Triploidy 22Q.11.2 Deletion - Blood,; Future  -     POC Urinalysis Dipstick  -     Gestational Diabetes Screen 1 Hour; Future  -     Jose Panorama Prenatal Test: Chromosomes 13, 18, 21, X & Y: Triploidy 22Q.11.2 Deletion - Blood, Blood, Venous  -      Gestational Diabetes Screen 1 Hour      Counseling:    Second trimester precautions  Discussed insufficient fetal fraction on NIPS    Reassuring pregnancy progress. Questions answered.  Continue PNV.  Importance of healthy eating, obtaining sufficient sleep, and staying active unless hypertensive- activity modified as directed.    Return in about 4 weeks (around 11/22/2024) for FU OB x2.            Indira Ruiz,   10/25/2024    [facility]  [facility] [place] [address]  Dept: [phone]  Dept Fax: [phone]  Loc: [phone]  Loc Fax: [phone]

## 2024-10-25 ENCOUNTER — ROUTINE PRENATAL (OUTPATIENT)
Dept: OBSTETRICS AND GYNECOLOGY | Facility: CLINIC | Age: 29
End: 2024-10-25
Payer: COMMERCIAL

## 2024-10-25 VITALS — DIASTOLIC BLOOD PRESSURE: 70 MMHG | WEIGHT: 216 LBS | SYSTOLIC BLOOD PRESSURE: 114 MMHG | BODY MASS INDEX: 37.08 KG/M2

## 2024-10-25 DIAGNOSIS — Z34.00 SUPERVISION OF NORMAL FIRST PREGNANCY, ANTEPARTUM: Primary | ICD-10-CM

## 2024-10-25 LAB
GLUCOSE 1H P GLC SERPL-MCNC: 95 MG/DL (ref 65–139)
GLUCOSE UR STRIP-MCNC: NEGATIVE MG/DL
PROT UR STRIP-MCNC: NEGATIVE MG/DL

## 2024-10-25 PROCEDURE — 82950 GLUCOSE TEST: CPT | Performed by: OBSTETRICS & GYNECOLOGY

## 2024-11-11 ENCOUNTER — TRANSCRIBE ORDERS (OUTPATIENT)
Dept: ULTRASOUND IMAGING | Facility: HOSPITAL | Age: 29
End: 2024-11-11
Payer: COMMERCIAL

## 2024-11-11 DIAGNOSIS — G93.2 INTRACRANIAL HYPERTENSION: Primary | ICD-10-CM

## 2024-11-12 NOTE — PROGRESS NOTES
OB FOLLOW UP      Chief Complaint   Patient presents with    Routine Prenatal Visit     Subjective:   Shoulder left and neck discomfort, long term, worsening in preg, desires PT.      Objective:  /81   Wt 98.4 kg (217 lb)   LMP 07/12/2024 (Exact Date)   BMI 37.25 kg/m²  0.907 kg (2 lb) Body mass index is 37.25 kg/m².    See OB flow sheet for fundal height (not performed if US day of OV), FHT, edema, cvx exam if performed, and Upro/Uglu.   Chaperone present during pelvic exam if performed.      Assessment and Plan:  18w0d     Diagnoses and all orders for this visit:    1. Supervision of normal first pregnancy, antepartum (Primary)  Overview:  DEBI finalized: 4/18/24 by LMP and confirmed by 6week US    Optional testing NIPS,CF/SMA,AFP:  CF neg.  SMA neg.  NIPS 9/27 insufficient plasma, redraw low risk XY.  Desires AFP     COVID: Recommended   Flu: Vaccinated 11/15/2024   Tdap:    Rhogam:  1hr Glucola:  FU TPA w glucola:  ? Desires Sterilization:    Anatomy US: w [facility] 11/22/24  FU US:    PROBLEM LIST/PLAN:   PCOS- early glucola 10/25 wnl  Anxiety/depression- stopped meds w +preg test (Wellbutrin and Buspar).  Stable  Previously discussed importance of mental health and current guidelines to use meds if needed.    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  Checking BP at home and wnl  Intracranial hypertension- awaiting dx, [facility] consulted  Elev pregravid BMI 37- plan APT and growth US  +THC- Stopped after positive preg test, continue to avoid    Can't take big pills- rec try w M+Ms, not all pills can be substituted with chewables or small pills    Orders:  -     POC Urinalysis Dipstick  -     Alpha Fetoprotein, Maternal    2. Neck and shoulder pain  -     Ambulatory Referral to Physical Therapy for Evaluation & Treatment    3. Need for influenza vaccination  -     Fluzone >6mos      Counseling:     Second trimester precautions    Reassuring pregnancy progress. Questions answered.  Continue PNV.  Importance of healthy eating, obtaining sufficient sleep, and staying active unless hypertensive- activity modified as directed.    Return in about 4 weeks (around 12/13/2024) for FU OB x2.            Indira Ruiz, DO  11/15/2024    [facility]  [facility] [place] [address]  Dept: [phone]  Dept Fax: [phone]  Loc: [phone]  Loc Fax: [phone]

## 2024-11-15 ENCOUNTER — ROUTINE PRENATAL (OUTPATIENT)
Dept: OBSTETRICS AND GYNECOLOGY | Facility: CLINIC | Age: 29
End: 2024-11-15
Payer: COMMERCIAL

## 2024-11-15 VITALS — WEIGHT: 217 LBS | BODY MASS INDEX: 37.25 KG/M2 | DIASTOLIC BLOOD PRESSURE: 81 MMHG | SYSTOLIC BLOOD PRESSURE: 118 MMHG

## 2024-11-15 DIAGNOSIS — M54.2 NECK AND SHOULDER PAIN: ICD-10-CM

## 2024-11-15 DIAGNOSIS — M25.519 NECK AND SHOULDER PAIN: ICD-10-CM

## 2024-11-15 DIAGNOSIS — Z23 NEED FOR INFLUENZA VACCINATION: ICD-10-CM

## 2024-11-15 DIAGNOSIS — Z34.00 SUPERVISION OF NORMAL FIRST PREGNANCY, ANTEPARTUM: Primary | ICD-10-CM

## 2024-11-15 LAB
GLUCOSE UR STRIP-MCNC: NEGATIVE MG/DL
PROT UR STRIP-MCNC: NEGATIVE MG/DL

## 2024-11-15 PROCEDURE — 82105 ALPHA-FETOPROTEIN SERUM: CPT | Performed by: OBSTETRICS & GYNECOLOGY

## 2024-11-18 DIAGNOSIS — N89.8 VAGINAL DISCHARGE: ICD-10-CM

## 2024-11-18 DIAGNOSIS — N89.8 VAGINAL DISCHARGE: Primary | ICD-10-CM

## 2024-11-18 PROCEDURE — 87510 GARDNER VAG DNA DIR PROBE: CPT | Performed by: NURSE PRACTITIONER

## 2024-11-18 PROCEDURE — 87480 CANDIDA DNA DIR PROBE: CPT | Performed by: NURSE PRACTITIONER

## 2024-11-18 PROCEDURE — 87660 TRICHOMONAS VAGIN DIR PROBE: CPT | Performed by: NURSE PRACTITIONER

## 2024-11-20 LAB
AFP INTERP SERPL-IMP: NORMAL
AFP INTERP SERPL-IMP: NORMAL
AFP MOM SERPL: 0.86
AFP SERPL-MCNC: 29.6 NG/ML
AGE AT DELIVERY: 27.3 YR
GA METHOD: NORMAL
GA: 18 WEEKS
IDDM PATIENT QL: NORMAL
LABORATORY COMMENT REPORT: NORMAL
MULTIPLE PREGNANCY: NORMAL
NEURAL TUBE DEFECT RISK FETUS: NORMAL %
RESULT: NORMAL

## 2024-11-22 ENCOUNTER — HOSPITAL ENCOUNTER (OUTPATIENT)
Dept: ULTRASOUND IMAGING | Facility: HOSPITAL | Age: 29
Discharge: HOME | End: 2024-11-22
Payer: COMMERCIAL

## 2024-11-22 ENCOUNTER — OFFICE VISIT (OUTPATIENT)
Dept: OBSTETRICS AND GYNECOLOGY | Facility: CLINIC | Age: 29
End: 2024-11-22
Payer: COMMERCIAL

## 2024-11-22 VITALS
TEMPERATURE: 97.8 F | BODY MASS INDEX: 37.59 KG/M2 | DIASTOLIC BLOOD PRESSURE: 64 MMHG | WEIGHT: 219 LBS | HEART RATE: 90 BPM | OXYGEN SATURATION: 98 % | SYSTOLIC BLOOD PRESSURE: 119 MMHG

## 2024-11-22 DIAGNOSIS — O99.210 OBESITY IN PREGNANCY, ANTEPARTUM: ICD-10-CM

## 2024-11-22 DIAGNOSIS — G93.2 INTRACRANIAL HYPERTENSION: ICD-10-CM

## 2024-11-22 DIAGNOSIS — I82.0 CHIARI SYNDROME: ICD-10-CM

## 2024-11-22 DIAGNOSIS — G93.2 INTRACRANIAL HYPERTENSION: Primary | ICD-10-CM

## 2024-11-22 PROCEDURE — 76811 OB US DETAILED SNGL FETUS: CPT

## 2024-11-22 NOTE — PROGRESS NOTES
MATERNAL FETAL MEDICINE Consult Note    Dear Dr Indira Ruiz, DO:    Thank you for your kind referral of Mima Wolfe.  As you know, she is a 28 y.o.   19w0d gestation (Estimated Date of Delivery: 25). This is a consult.      Her antepartum course is complicated by:  Pseudotumor cerebri with significant symptoms  Hx of Chiari malformation s/p decompression    Aneuploidy Screening: low risk    HPI: Today, she denies headache, blurry vision, RUQ pain. No vaginal bleeding, no contractions.     Review of History:  Past Medical History:   Diagnosis Date    Anxiety     Chiari malformation type I     Cholelithiasis     Depression     GERD (gastroesophageal reflux disease)     History of migraine with aura 2022    HSV serum positive I and II     IBS (irritable bowel syndrome)     Idiopathic intracranial hypertension     Sees Dr. Fontanez with [facility]- will folow PP    Ingrown toenail     Ovarian cyst     PCOS (polycystic ovarian syndrome) 2022    By US polycystic ovaries and chronic anovulation       Past Surgical History:   Procedure Laterality Date    CERVICAL CHIARI DECOMPRESSION POSTERIOR  10/03/2017    CHOLECYSTECTOMY      COLONOSCOPY N/A 11/15/2021    Procedure: COLONOSCOPY with biopsy;  Surgeon: Khari Carlson MD;  Location: [facility] ENDOSCOPY;  Service: Gastroenterology;  Laterality: N/A;  normal colon    ENDOSCOPY N/A 11/15/2021    Procedure: ESOPHAGOGASTRODUODENOSCOPY;  Surgeon: Khari Carlson MD;  Location: [facility] ENDOSCOPY;  Service: Gastroenterology;  Laterality: N/A;  normal EGD    EYE SURGERY      x2    WISDOM TOOTH EXTRACTION         Social History     Socioeconomic History    Marital status: Single   Tobacco Use    Smoking status: Passive Smoke Exposure - Never Smoker     Passive exposure: Past    Smokeless tobacco: Never   Vaping Use    Vaping status: Never Used   Substance and Sexual Activity    Alcohol use: Not Currently     Comment: 1-2 times a month     Drug use: Never    Sexual activity: Yes     Partners: Male     Birth control/protection: None     Family History   Problem Relation Age of Onset    Diabetes Father         borderline    Hypertension Father     Fibromyalgia Mother     Fibromyalgia Sister     Colon cancer Maternal Aunt 40    Colon cancer Maternal Uncle 30    Ovarian cancer Cousin 25    Malig Hyperthermia Neg Hx     Uterine cancer Neg Hx     Breast cancer Neg Hx     Deep vein thrombosis Neg Hx     Pulmonary embolism Neg Hx       Allergies   Allergen Reactions    Metoclopramide Shortness Of Breath    Retin-A [Tretinoin] Rash      Current Outpatient Medications on File Prior to Visit   Medication Sig Dispense Refill    doxylamine (UNISOM) 25 MG tablet Take 1 tablet by mouth At Night As Needed for Sleep or Nausea (or anxiety). May take and extra 1/2 tablet PRN persistent nausea or anxiety 30 tablet 1    folic acid (FOLVITE) 1 MG tablet Take 1 tablet by mouth Daily. 90 tablet 3    ondansetron ODT (ZOFRAN-ODT) 4 MG disintegrating tablet Place 1 tablet on the tongue Every 8 (Eight) Hours As Needed for Nausea or Vomiting. 30 tablet 1    vitamin B-6 (PYRIDOXINE) 25 MG tablet Take 1 tablet by mouth 2 (Two) Times a Day. For nausea in pregnancy 60 tablet 1    promethazine (PHENERGAN) 25 MG tablet Take 1 tablet by mouth Every 6 (Six) Hours As Needed for Nausea or Vomiting. (Patient not taking: Reported on 11/22/2024) 30 tablet 1     No current facility-administered medications on file prior to visit.        Past obstetric, gynecological, medical, surgical, family and social history reviewed.  Relevant lab work and imaging reviewed.    Review of systems  Constitutional:  denies fever, chills, malaise.   ENT/Mouth:  denies sore throat, tinnitus  Eyes: denies vision changes/pain  CV:  denies chest pain  Respiratory:  denies cough/SOB  GI:  denies N/V, diarrhea, abdominal pain.    :   denies dysuria  Skin:  denies lesions or pruritus   Neuro:  denies weakness, focal  neurologic symptoms    Vitals:    24 1404   BP: 119/64   BP Location: Right arm   Patient Position: Sitting   Pulse: 90   Temp: 97.8 °F (36.6 °C)   TempSrc: Temporal   SpO2: 98%   Weight: 99.3 kg (219 lb)       PHYSICAL EXAM   GENERAL: Not in acute distress, AAOx3, pleasant  CARDIO: regular rate and rhythm  PULM: symmetric chest rise, speaking in complete sentences without difficulty  NEURO: awake, alert and oriented to person, place, and time  ABDOMINAL: No fundal tenderness, no rebound or guarding, gravid  EXTREMITIES: no bilateral lower extremity edema/tenderness  SKIN: Warm, well-perfused      ULTRASOUND   Please view full ultrasound note on Imaging tab in ViewPoint.  Cephalic presentation.  Posterior placenta with lake noted.  MVP 5.3 cm, which is normal.    g (AC 50%)  Normal appearing anatomy except suboptimal heart views, EIFs noted but not clinically significant int he setting of low risk NIPT.    Transabdominal CL >3.5 cm, which is normal.      ASSESSMENT/COUNSELIN y.o.   19w0d gestation (Estimated Date of Delivery: 25).       -Pregnancy  [ X ] stable  [   ] improving [  ] worsening    Diagnoses and all orders for this visit:    1. Intracranial hypertension (Primary)  Overview:  Suspected, NS follows.      2. Obesity in pregnancy, antepartum    3. Chiari syndrome         Pseudotumor cerebri  Hx of Chiari malformation s/p decompression  Idiopathic intracranial hypertension (IIH), also sometimes referred to as benign intracranial hypertension (BIH) or pseudotumor cerebri,  is a syndrome of increased intracranial pressure related to elevated cerebrospinal fluid (CSF) pressure but  unrelated to intracranial mass or other pathology.  The rate of pregnancy complications in patients with IHH is similar to the general population. However, the presence of headache can complicate the diagnosis of complications of hypertensive disorders of pregnancy such as pre-eclampsia.  In general,  the management of IIH in pregnancy is the same as for nonpregnant patients.  Serial LP, and medications such as acetazolamide (Diamox) and corticosteroids may be used in pregnancy as clinically indicated.  Regarding anesthesia for delivery, in general regional anesthesia is a reasonable option for patients without prior LP shunt or other complicating factors (pre-delivery anesthesia consult may be beneficial for patient-specific counseling).      She can take diamox if absolutely needed for symptoms, sparingly.      She of note, has a hx of chiari malformation s/p decompression.  She has chronic headaches/pressure that worsens with sneezing/bearing down.  She said in 2017 when she had her decompression, her neurosurgeon told her she would need to have a CS.  We will try to get these records (pt working on it), but given her symptoms with valsalva, I would recommend a primary CS to avoid hindbrain herniation, etc.  She is amenable to this.     Early 2024 LP:  1.Successful fluoroscopically guided lumbar puncture with opening   pressure of 18 cm of water.     Summary of Plan  -Recommend serial growths by 32-36 weeks (by primary OB)   -Careful attention to symptoms--may have to re-evaluate mediation if pt becomes more symptomatic.   -Recommend anesthesia consult to be scheduled by primary OB and primary CS for hx of Chiari decompression and significant symptoms with valsalva.    --Follow up with M for completion of anatomy.  -Delivery 39th week unless indicated sooner.    Follow-up: 1 month growth.    Thank you for the consult and opportunity to care for this patient.  Please feel free to reach out with any questions or concerns.      I spent 35 minutes caring for this patient on this date of service. This time includes time spent by me in the following activities: preparing for the visit, reviewing tests, obtaining and/or reviewing a separately obtained history, performing a medically appropriate examination and/or  evaluation, counseling and educating the patient/family/caregiver and independently interpreting results and communicating that information with the patient/family/caregiver with greater than 50% spent in counseling and coordination of care.       I spent 5 minutes on the separately reported service of US imaging not included in the time used to support the E/M service also reported today.      Di Garcia MD [facility]  Maternal Fetal Medicine-[facility]  Office: [phone]  [email or web address]

## 2024-11-22 NOTE — PROGRESS NOTES
Pt reports that she is doing well and denies vaginal bleeding, cramping, contractions or LOF at this time. Reports active fetal movement. Reviewed when to call OB office or present to L&D for evaluation with symptoms such as decreased fetal movement, vaginal bleeding, LOF or ctxs. Pt verbalized understanding. Reports history of migraines and headaches with intense pressure after sneezing, coughing, etc. Notes visual changes (not outside her normal) and denies epigastric pain. Notes occasional swelling in lower extremities, denies consistency. Next OB appointment scheduled for 12/6.  Reports she is following with Dr. Fontanez with [facility], no appointments scheduled at this time and will plan to follow up postpartum unless encouraged otherwise.     Vitals:    11/22/24 1404   BP: 119/64   Pulse: 90   Temp: 97.8 °F (36.6 °C)   SpO2: 98%

## 2024-11-22 NOTE — LETTER
2024     Indira Ruiz DO  [address] [place] [address]    Patient: Mima Wolfe   YOB: 1995   Date of Visit: 2024       Dear Indira Ruiz DO,    Thank you for referring Mima Wolfe to me for evaluation. Below is a copy of my consult note.    If you have questions, please do not hesitate to call me. I look forward to following Mima along with you.         Sincerely,        Di Garcia MD    MATERNAL FETAL MEDICINE Consult Note    Dear Dr Indira Ruiz DO:    Thank you for your kind referral of Mima Wolfe.  As you know, she is a 28 y.o.   19w0d gestation (Estimated Date of Delivery: 25). This is a consult.      Her antepartum course is complicated by:  Pseudotumor cerebri with significant symptoms  Hx of Chiari malformation s/p decompression    Aneuploidy Screening: low risk    HPI: Today, she denies headache, blurry vision, RUQ pain. No vaginal bleeding, no contractions.     Review of History:  Past Medical History:   Diagnosis Date   • Anxiety    • Chiari malformation type I    • Cholelithiasis    • Depression    • GERD (gastroesophageal reflux disease)    • History of migraine with aura 2022   • HSV serum positive I and II    • IBS (irritable bowel syndrome)    • Idiopathic intracranial hypertension     Sees Dr. Fontanez with [facility]   • Ingrown toenail    • Ovarian cyst    • PCOS (polycystic ovarian syndrome) 2022    By US polycystic ovaries and chronic anovulation       Past Surgical History:   Procedure Laterality Date   • CERVICAL CHIARI DECOMPRESSION POSTERIOR  10/03/2017   • CHOLECYSTECTOMY     • COLONOSCOPY N/A 11/15/2021    Procedure: COLONOSCOPY with biopsy;  Surgeon: Khari Carlson MD;  Location: [facility] ENDOSCOPY;  Service: Gastroenterology;  Laterality: N/A;  normal colon   • ENDOSCOPY N/A 11/15/2021    Procedure: ESOPHAGOGASTRODUODENOSCOPY;  Surgeon: Khari Carlson MD;  Location:   [facility] ENDOSCOPY;  Service: Gastroenterology;  Laterality: N/A;  normal EGD   • EYE SURGERY      x2   • WISDOM TOOTH EXTRACTION         Social History     Socioeconomic History   • Marital status: Single   Tobacco Use   • Smoking status: Passive Smoke Exposure - Never Smoker     Passive exposure: Past   • Smokeless tobacco: Never   Vaping Use   • Vaping status: Never Used   Substance and Sexual Activity   • Alcohol use: Not Currently     Comment: 1-2 times a month   • Drug use: Never   • Sexual activity: Yes     Partners: Male     Birth control/protection: None     Family History   Problem Relation Age of Onset   • Diabetes Father         borderline   • Hypertension Father    • Fibromyalgia Mother    • Fibromyalgia Sister    • Colon cancer Maternal Aunt 40   • Colon cancer Maternal Uncle 30   • Ovarian cancer Cousin 25   • Malig Hyperthermia Neg Hx    • Uterine cancer Neg Hx    • Breast cancer Neg Hx    • Deep vein thrombosis Neg Hx    • Pulmonary embolism Neg Hx       Allergies   Allergen Reactions   • Metoclopramide Shortness Of Breath   • Retin-A [Tretinoin] Rash      Current Outpatient Medications on File Prior to Visit   Medication Sig Dispense Refill   • doxylamine (UNISOM) 25 MG tablet Take 1 tablet by mouth At Night As Needed for Sleep or Nausea (or anxiety). May take and extra 1/2 tablet PRN persistent nausea or anxiety 30 tablet 1   • folic acid (FOLVITE) 1 MG tablet Take 1 tablet by mouth Daily. 90 tablet 3   • ondansetron ODT (ZOFRAN-ODT) 4 MG disintegrating tablet Place 1 tablet on the tongue Every 8 (Eight) Hours As Needed for Nausea or Vomiting. 30 tablet 1   • vitamin B-6 (PYRIDOXINE) 25 MG tablet Take 1 tablet by mouth 2 (Two) Times a Day. For nausea in pregnancy 60 tablet 1   • promethazine (PHENERGAN) 25 MG tablet Take 1 tablet by mouth Every 6 (Six) Hours As Needed for Nausea or Vomiting. (Patient not taking: Reported on 11/22/2024) 30 tablet 1     No current facility-administered medications on  file prior to visit.        Past obstetric, gynecological, medical, surgical, family and social history reviewed.  Relevant lab work and imaging reviewed.    Review of systems  Constitutional:  denies fever, chills, malaise.   ENT/Mouth:  denies sore throat, tinnitus  Eyes: denies vision changes/pain  CV:  denies chest pain  Respiratory:  denies cough/SOB  GI:  denies N/V, diarrhea, abdominal pain.    :   denies dysuria  Skin:  denies lesions or pruritus   Neuro:  denies weakness, focal neurologic symptoms    Vitals:    24 1404   BP: 119/64   BP Location: Right arm   Patient Position: Sitting   Pulse: 90   Temp: 97.8 °F (36.6 °C)   TempSrc: Temporal   SpO2: 98%   Weight: 99.3 kg (219 lb)       PHYSICAL EXAM   GENERAL: Not in acute distress, AAOx3, pleasant  CARDIO: regular rate and rhythm  PULM: symmetric chest rise, speaking in complete sentences without difficulty  NEURO: awake, alert and oriented to person, place, and time  ABDOMINAL: No fundal tenderness, no rebound or guarding, gravid  EXTREMITIES: no bilateral lower extremity edema/tenderness  SKIN: Warm, well-perfused      ULTRASOUND   Please view full ultrasound note on Imaging tab in ViewPoint.  Cephalic presentation.  Posterior placenta with lake noted.  MVP 5.3 cm, which is normal.    g (AC 50%)  Normal appearing anatomy except suboptimal heart views, EIFs noted but not clinically significant int he setting of low risk NIPT.    Transabdominal CL >3.5 cm, which is normal.      ASSESSMENT/COUNSELIN y.o.   19w0d gestation (Estimated Date of Delivery: 25).       -Pregnancy  [ X ] stable  [   ] improving [  ] worsening    Diagnoses and all orders for this visit:    1. Intracranial hypertension (Primary)  Overview:  Suspected, NS follows.      2. Obesity in pregnancy, antepartum    3. Chiari syndrome         Pseudotumor cerebri  Hx of Chiari malformation s/p decompression  Idiopathic intracranial hypertension (IIH), also  sometimes referred to as benign intracranial hypertension (BIH) or pseudotumor cerebri,  is a syndrome of increased intracranial pressure related to elevated cerebrospinal fluid (CSF) pressure but  unrelated to intracranial mass or other pathology.  The rate of pregnancy complications in patients with IHH is similar to the general population. However, the presence of headache can complicate the diagnosis of complications of hypertensive disorders of pregnancy such as pre-eclampsia.  In general, the management of IIH in pregnancy is the same as for nonpregnant patients.  Serial LP, and medications such as acetazolamide (Diamox) and corticosteroids may be used in pregnancy as clinically indicated.  Regarding anesthesia for delivery, in general regional anesthesia is a reasonable option for patients without prior LP shunt or other complicating factors (pre-delivery anesthesia consult may be beneficial for patient-specific counseling).      She can take diamox if absolutely needed for symptoms, sparingly.      She of note, has a hx of chiari malformation s/p decompression.  She has chronic headaches/pressure that worsens with sneezing/bearing down.  She said in 2017 when she had her decompression, her neurosurgeon told her she would need to have a CS.  We will try to get these records (pt working on it), but given her symptoms with valsalva, I would recommend a primary CS to avoid hindbrain herniation, etc.  She is amenable to this.     Early 2024 LP:  1.Successful fluoroscopically guided lumbar puncture with opening   pressure of 18 cm of water.     Summary of Plan  -Recommend serial growths by 32-36 weeks (by primary OB)   -Careful attention to symptoms--may have to re-evaluate mediation if pt becomes more symptomatic.   -Recommend anesthesia consult to be scheduled by primary OB and primary CS for hx of Chiari decompression and significant symptoms with valsalva.    --Follow up with M for completion of  anatomy.  -Delivery 39th week unless indicated sooner.    Follow-up: 1 month growth.    Thank you for the consult and opportunity to care for this patient.  Please feel free to reach out with any questions or concerns.      I spent 35 minutes caring for this patient on this date of service. This time includes time spent by me in the following activities: preparing for the visit, reviewing tests, obtaining and/or reviewing a separately obtained history, performing a medically appropriate examination and/or evaluation, counseling and educating the patient/family/caregiver and independently interpreting results and communicating that information with the patient/family/caregiver with greater than 50% spent in counseling and coordination of care.       I spent 5 minutes on the separately reported service of US imaging not included in the time used to support the E/M service also reported today.      Di Garcia MD FACOG  Maternal Fetal Medicine-[facility]  Office: [phone]  [email or web address]

## 2024-11-25 PROBLEM — K21.9 GASTROESOPHAGEAL REFLUX DISEASE: Status: RESOLVED | Noted: 2021-07-27 | Resolved: 2024-11-25

## 2024-11-25 PROBLEM — R10.84 GENERALIZED ABDOMINAL PAIN: Status: RESOLVED | Noted: 2021-07-27 | Resolved: 2024-11-25

## 2024-11-25 PROBLEM — R14.0 ABDOMINAL BLOATING: Status: RESOLVED | Noted: 2021-07-27 | Resolved: 2024-11-25

## 2024-11-25 PROBLEM — Q07.00 ARNOLD-CHIARI MALFORMATION: Status: ACTIVE | Noted: 2024-11-25

## 2024-11-25 PROBLEM — R11.0 NAUSEA: Status: RESOLVED | Noted: 2021-07-27 | Resolved: 2024-11-25

## 2024-11-25 PROBLEM — R19.7 DIARRHEA: Status: RESOLVED | Noted: 2021-07-27 | Resolved: 2024-11-25

## 2024-11-27 NOTE — PROGRESS NOTES
OB FOLLOW UP      Chief Complaint   Patient presents with    Routine Prenatal Visit     Subjective:   No complaints.  Denies VB, leaking fluid, current regular cramping or contractions.    Objective:  /75   Wt 100 kg (221 lb)   LMP 07/12/2024 (Exact Date)   BMI 37.93 kg/m²  2.722 kg (6 lb) Body mass index is 37.93 kg/m².    See OB flow sheet for fundal height (not performed if US day of OV), FHT, edema, cvx exam if performed, and Upro/Uglu.   Chaperone present during pelvic exam if performed.      Assessment and Plan:  21w0d     Diagnoses and all orders for this visit:    1. Supervision of normal first pregnancy, antepartum (Primary)  Overview:  DEBI finalized: 4/18/24 by LMP and confirmed by 6week US    CF neg.  SMA neg.  NIPS 9/27 insufficient plasma, redraw low risk XY.  AFP neg.    COVID: Recommended, declines  Flu: Vaccinated Nov 2024  Tdap:    Rhogam:  1hr Glucola:  FU TPA w glucola:  ? Desires Sterilization:    Anatomy US: [facility] 12/6/24 Vertex, posterior placenta with placental lake.  Normal growth AC 50%.  Normal anatomy.  Suboptimal cardiac views.  EIF-not clinically significant  FU US: [facility] 12/20/24    PROBLEM LIST/PLAN:   Pseudotumor cerebri with significant symptoms   SHIRLEY Pretty   [facility]   Serial LP, and medications such as acetazolamide (Diamox) and corticosteroids may be used in pregnancy as clinically indicated   Hx of Chiari malformation s/p decompression- symptoms w valsalva  SHIRLYE Pretty  [facility]  Plan primary CS to avoid hindbrain herniation  Anesthesia consult  Serial US growth at 32weeks [facility]  Del at 39th week  Anxiety/depression- stopped meds w +preg test (Wellbutrin and Buspar).  Stable  Previously discussed importance of mental health and current guidelines to use meds if needed.    12/6/2024  doing well  Serum positive HSV 1 and 2 (cold sores only)- plan prophylaxis at 34-36weeks  Elev pregravid BMI 37- plan APT and growth US    PCOS- early glucola 10/25 wnl  +THC- Stopped after positive  preg test  Elev BP early on viability OV- FU wnl.      Can't take big pills- rec try w M+Ms, not all pills can be substituted with chewables or small pills    Orders:  -     POC Urinalysis Dipstick    2. Gastroesophageal reflux disease without esophagitis  -     pantoprazole (Protonix) 20 MG EC tablet; Take 1 tablet by mouth Daily As Needed for Heartburn.  Dispense: 30 tablet; Refill: 1      Counseling:    Second trimester precautions  GERD in pregnancy discussed.  Recommend smaller meals, avoid lying down at least 2hrs after meals, and avoid foods that trigger it (commonly highly seasoned foods, pizza, italian, mexican etc).  OTC Tums safe.  If using them regularly recommend other medication options that can be prescribed.       Reassuring pregnancy progress. Questions answered.  Continue PNV.  Importance of healthy eating, obtaining sufficient sleep, and staying active unless hypertensive- activity modified as directed.    Return in about 4 weeks (around 1/3/2025) for FU OB w glucola, then OV 3weeks later w rhogam.            Indira Ruiz,   12/06/2024    [facility]  [address] [place] [address]  Dept: [phone]  Dept Fax: [phone]  Loc: [phone]  Loc Fax: [phone]

## 2024-12-06 ENCOUNTER — ROUTINE PRENATAL (OUTPATIENT)
Dept: OBSTETRICS AND GYNECOLOGY | Facility: CLINIC | Age: 29
End: 2024-12-06
Payer: COMMERCIAL

## 2024-12-06 VITALS — BODY MASS INDEX: 37.93 KG/M2 | WEIGHT: 221 LBS | SYSTOLIC BLOOD PRESSURE: 108 MMHG | DIASTOLIC BLOOD PRESSURE: 75 MMHG

## 2024-12-06 DIAGNOSIS — K21.9 GASTROESOPHAGEAL REFLUX DISEASE WITHOUT ESOPHAGITIS: ICD-10-CM

## 2024-12-06 DIAGNOSIS — Z34.00 SUPERVISION OF NORMAL FIRST PREGNANCY, ANTEPARTUM: Primary | ICD-10-CM

## 2024-12-06 LAB
GLUCOSE UR STRIP-MCNC: NEGATIVE MG/DL
PROT UR STRIP-MCNC: NEGATIVE MG/DL

## 2024-12-06 RX ORDER — PANTOPRAZOLE SODIUM 20 MG/1
20 TABLET, DELAYED RELEASE ORAL DAILY PRN
Qty: 30 TABLET | Refills: 1 | Status: SHIPPED | OUTPATIENT
Start: 2024-12-06

## 2024-12-13 ENCOUNTER — TRANSCRIBE ORDERS (OUTPATIENT)
Dept: ULTRASOUND IMAGING | Facility: HOSPITAL | Age: 29
End: 2024-12-13
Payer: COMMERCIAL

## 2024-12-13 DIAGNOSIS — G93.2 INTRACRANIAL HYPERTENSION: Primary | ICD-10-CM

## 2024-12-13 DIAGNOSIS — O28.3 ABNORMAL FETAL ULTRASOUND: ICD-10-CM

## 2024-12-20 ENCOUNTER — OFFICE VISIT (OUTPATIENT)
Dept: OBSTETRICS AND GYNECOLOGY | Facility: CLINIC | Age: 29
End: 2024-12-20
Payer: COMMERCIAL

## 2024-12-20 ENCOUNTER — HOSPITAL ENCOUNTER (OUTPATIENT)
Dept: ULTRASOUND IMAGING | Facility: HOSPITAL | Age: 29
Discharge: HOME | End: 2024-12-20
Payer: COMMERCIAL

## 2024-12-20 ENCOUNTER — HOSPITAL ENCOUNTER (OUTPATIENT)
Dept: GENERAL RADIOLOGY | Facility: HOSPITAL | Age: 29
Discharge: HOME | End: 2024-12-20
Payer: COMMERCIAL

## 2024-12-20 VITALS — HEART RATE: 99 BPM | TEMPERATURE: 98.4 F | SYSTOLIC BLOOD PRESSURE: 111 MMHG | DIASTOLIC BLOOD PRESSURE: 69 MMHG

## 2024-12-20 DIAGNOSIS — R05.1 ACUTE COUGH: ICD-10-CM

## 2024-12-20 DIAGNOSIS — G93.2 INTRACRANIAL HYPERTENSION: ICD-10-CM

## 2024-12-20 DIAGNOSIS — R05.1 ACUTE COUGH: Primary | ICD-10-CM

## 2024-12-20 DIAGNOSIS — Q07.00 ARNOLD-CHIARI MALFORMATION: ICD-10-CM

## 2024-12-20 DIAGNOSIS — G93.2 INTRACRANIAL HYPERTENSION: Primary | ICD-10-CM

## 2024-12-20 DIAGNOSIS — O28.3 ABNORMAL FETAL ULTRASOUND: ICD-10-CM

## 2024-12-20 PROCEDURE — 71046 X-RAY EXAM CHEST 2 VIEWS: CPT

## 2024-12-20 PROCEDURE — 76816 OB US FOLLOW-UP PER FETUS: CPT

## 2024-12-20 RX ORDER — AMOXICILLIN 500 MG/1
500 CAPSULE ORAL EVERY 8 HOURS
COMMUNITY

## 2024-12-20 NOTE — LETTER
2024     Indira Ruiz DO  [address] [place] [address]    Patient: Mima Wolfe   YOB: 1995   Date of Visit: 2024       Dear Indira Ruiz DO    iMma Wolfe was in my office today. Below is a copy of my note.    If you have questions, please do not hesitate to call me. I look forward to following Mima along with you.         Sincerely,        ANDREA Patel        CC: No Recipients    Pt reports that she is doing well and denies vaginal bleeding, cramping, contractions or LOF at this time. Reports active fetal movement. Reviewed when to call OB office or present to L&D for evaluation with symptoms such as decreased fetal movement, vaginal bleeding, LOF or ctxs. Pt verbalized understanding. Denies epigastric pain. Reports she was currently seen for sinus/ear infection and has been having HA and seeing floaters since sinus infection started. Denies any additional complaints at time of appointment. Next OB appointment scheduled for 1/3.    Vitals:    24 1222   BP: 111/69   Pulse: 99   Temp: 98.4 °F (36.9 °C)         MATERNAL FETAL MEDICINE Consult Note    Dear Dr Indira Ruiz DO:    Thank you for your kind referral of Mima Wolfe.  As you know, she is a 29 y.o.   23w0d gestation (Estimated Date of Delivery: 25). This is a consult.      Her antepartum course is complicated by:  Pseudotumor cerebri with significant symptoms  Hx of Chiari malformation s/p decompression    Aneuploidy Screening: low risk    HPI: Today, she denies headache, blurry vision, RUQ pain. No vaginal bleeding, no contractions.     Review of History:  Past Medical History:   Diagnosis Date   • Anxiety    • Chiari malformation type I    • Cholelithiasis    • Depression    • GERD (gastroesophageal reflux disease)    • History of migraine with aura 2022   • HSV serum positive I and II    • IBS (irritable bowel syndrome)    • Idiopathic intracranial hypertension      Sees Dr. Fontanez with [facility]- will fol PP   • Ingrown toenail    • Ovarian cyst    • PCOS (polycystic ovarian syndrome) 04/25/2022    By US polycystic ovaries and chronic anovulation       Past Surgical History:   Procedure Laterality Date   • CERVICAL CHIARI DECOMPRESSION POSTERIOR  10/03/2017   • CHOLECYSTECTOMY     • COLONOSCOPY N/A 11/15/2021    Procedure: COLONOSCOPY with biopsy;  Surgeon: Khari Carlson MD;  Location: [facility] ENDOSCOPY;  Service: Gastroenterology;  Laterality: N/A;  normal colon   • ENDOSCOPY N/A 11/15/2021    Procedure: ESOPHAGOGASTRODUODENOSCOPY;  Surgeon: Khari Carlson MD;  Location: [facility] ENDOSCOPY;  Service: Gastroenterology;  Laterality: N/A;  normal EGD   • EYE SURGERY      x2   • WISDOM TOOTH EXTRACTION         Social History     Socioeconomic History   • Marital status: Single   Tobacco Use   • Smoking status: Passive Smoke Exposure - Never Smoker     Passive exposure: Past   • Smokeless tobacco: Never   Vaping Use   • Vaping status: Never Used   Substance and Sexual Activity   • Alcohol use: Not Currently     Comment: 1-2 times a month   • Drug use: Never   • Sexual activity: Yes     Partners: Male     Birth control/protection: None     Family History   Problem Relation Age of Onset   • Diabetes Father         borderline   • Hypertension Father    • Fibromyalgia Mother    • Fibromyalgia Sister    • Colon cancer Maternal Aunt 40   • Colon cancer Maternal Uncle 30   • Ovarian cancer Cousin 25   • Malig Hyperthermia Neg Hx    • Uterine cancer Neg Hx    • Breast cancer Neg Hx    • Deep vein thrombosis Neg Hx    • Pulmonary embolism Neg Hx       Allergies   Allergen Reactions   • Metoclopramide Shortness Of Breath   • Retin-A [Tretinoin] Rash      Current Outpatient Medications on File Prior to Visit   Medication Sig Dispense Refill   • amoxicillin (AMOXIL) 500 MG capsule Take 1 capsule by mouth Every 8 (Eight) Hours.     • folic acid (FOLVITE) 1 MG tablet Take  1 tablet by mouth Daily. 90 tablet 3   • ondansetron ODT (ZOFRAN-ODT) 4 MG disintegrating tablet Place 1 tablet on the tongue Every 8 (Eight) Hours As Needed for Nausea or Vomiting. 30 tablet 1   • pantoprazole (Protonix) 20 MG EC tablet Take 1 tablet by mouth Daily As Needed for Heartburn. 30 tablet 1   • doxylamine (UNISOM) 25 MG tablet Take 1 tablet by mouth At Night As Needed for Sleep or Nausea (or anxiety). May take and extra 1/2 tablet PRN persistent nausea or anxiety (Patient not taking: Reported on 12/20/2024) 30 tablet 1   • promethazine (PHENERGAN) 25 MG tablet Take 1 tablet by mouth Every 6 (Six) Hours As Needed for Nausea or Vomiting. (Patient not taking: Reported on 12/6/2024) 30 tablet 1   • vitamin B-6 (PYRIDOXINE) 25 MG tablet Take 1 tablet by mouth 2 (Two) Times a Day. For nausea in pregnancy (Patient not taking: Reported on 12/20/2024) 60 tablet 1     No current facility-administered medications on file prior to visit.        Past obstetric, gynecological, medical, surgical, family and social history reviewed.  Relevant lab work and imaging reviewed.    Review of systems  Constitutional:  denies fever, chills, malaise.   ENT/Mouth:  denies sore throat, tinnitus  Eyes: denies vision changes/pain  CV:  denies chest pain  Respiratory:  denies cough/SOB  GI:  denies N/V, diarrhea, abdominal pain.    :   denies dysuria  Skin:  denies lesions or pruritus   Neuro:  denies weakness, focal neurologic symptoms    Vitals:    12/20/24 1222   BP: 111/69   BP Location: Right arm   Patient Position: Sitting   Pulse: 99   Temp: 98.4 °F (36.9 °C)   TempSrc: Temporal       PHYSICAL EXAM   GENERAL: Not in acute distress, AAOx3, pleasant  CARDIO: regular rate and rhythm  PULM: symmetric chest rise, speaking in complete sentences without difficulty  NEURO: awake, alert and oriented to person, place, and time  ABDOMINAL: No fundal tenderness, no rebound or guarding, gravid  EXTREMITIES: no bilateral lower extremity  edema/tenderness  SKIN: Warm, well-perfused      ULTRASOUND   Please view full ultrasound note on Imaging tab in ViewPoint.  Breech presentation  Posterior placenta  MVP 3.6 cm which is normal  Fetal biometry is consistent with dates EFW 45%, AC 35%  The fetal anatomic survey is now successfully complete and appears normal  Transabdominal cervical length 3.7 cm which is normal    ASSESSMENT/COUNSELIN y.o.   23w0d gestation (Estimated Date of Delivery: 25).       -Pregnancy  [ X ] stable  [   ] improving [  ] worsening    Diagnoses and all orders for this visit:    1. Intracranial hypertension (Primary)    2. Arnold-Chiari malformation  Overview:  S/p decompression  Surgeon and MFM rec primary CS due to persistent sxs w valsalva        Pseudotumor cerebri  Hx of Chiari malformation s/p decompression  Idiopathic intracranial hypertension (IIH), also sometimes referred to as benign intracranial hypertension (BIH) or pseudotumor cerebri,  is a syndrome of increased intracranial pressure related to elevated cerebrospinal fluid (CSF) pressure but  unrelated to intracranial mass or other pathology.  The rate of pregnancy complications in patients with IHH is similar to the general population. However, the presence of headache can complicate the diagnosis of complications of hypertensive disorders of pregnancy such as pre-eclampsia.  In general, the management of IIH in pregnancy is the same as for nonpregnant patients.  Serial LP, and medications such as acetazolamide (Diamox) and corticosteroids may be used in pregnancy as clinically indicated.  Regarding anesthesia for delivery, in general regional anesthesia is a reasonable option for patients without prior LP shunt or other complicating factors (pre-delivery anesthesia consult may be beneficial for patient-specific counseling).      She can take diamox if absolutely needed for symptoms, sparingly.      She of note, has a hx of chiari malformation s/p  decompression.  She has chronic headaches/pressure that worsens with sneezing/bearing down.  She said in 2017 when she had her decompression, her neurosurgeon told her she would need to have a CS.  We will try to get these records (pt working on it), but given her symptoms with valsalva, I would recommend a primary CS to avoid hindbrain herniation, etc.  She is amenable to this.     Early 2024 LP:  1.Successful fluoroscopically guided lumbar puncture with opening   pressure of 18 cm of water.     Summary of Plan  -Recommend serial growths by 32-36 weeks (by primary OB)   -Careful attention to symptoms--may have to re-evaluate medication if pt becomes more symptomatic.   -Recommend anesthesia consult to be scheduled by primary OB and primary CS for hx of Chiari decompression and significant symptoms with valsalva.    --Follow up with MFM for completion of anatomy.  -Delivery 39th week unless indicated sooner.    Follow-up:No follow up with MFM scheduled, but I am happy to see for follow up at request of primary obstetrician      Thank you for the consult and opportunity to care for this patient.  Please feel free to reach out with any questions or concerns.      I spent 15 minutes caring for this patient on this date of service. This time includes time spent by me in the following activities: preparing for the visit, reviewing tests, obtaining and/or reviewing a separately obtained history, performing a medically appropriate examination and/or evaluation, counseling and educating the patient/family/caregiver and independently interpreting results and communicating that information with the patient/family/caregiver with greater than 50% spent in counseling and coordination of care.     ANDREA Locke  Maternal Fetal Medicine-[facility]  Office: [phone]

## 2024-12-20 NOTE — PROGRESS NOTES
Pt reports that she is doing well and denies vaginal bleeding, cramping, contractions or LOF at this time. Reports active fetal movement. Reviewed when to call OB office or present to L&D for evaluation with symptoms such as decreased fetal movement, vaginal bleeding, LOF or ctxs. Pt verbalized understanding. Denies epigastric pain. Reports she was currently seen for sinus/ear infection and has been having HA and seeing floaters since sinus infection started. Denies any additional complaints at time of appointment. Next OB appointment scheduled for 1/3.    Vitals:    12/20/24 1222   BP: 111/69   Pulse: 99   Temp: 98.4 °F (36.9 °C)

## 2024-12-20 NOTE — PROGRESS NOTES
MATERNAL FETAL MEDICINE Consult Note    Dear Dr Indira Ruiz, DO:    Thank you for your kind referral of Mima Wolfe.  As you know, she is a 29 y.o.   23w0d gestation (Estimated Date of Delivery: 25). This is a consult.      Her antepartum course is complicated by:  Pseudotumor cerebri with significant symptoms  Hx of Chiari malformation s/p decompression    Aneuploidy Screening: low risk    HPI: Today, she denies headache, blurry vision, RUQ pain. No vaginal bleeding, no contractions.     Review of History:  Past Medical History:   Diagnosis Date    Anxiety     Chiari malformation type I     Cholelithiasis     Depression     GERD (gastroesophageal reflux disease)     History of migraine with aura 2022    HSV serum positive I and II     IBS (irritable bowel syndrome)     Idiopathic intracranial hypertension     Sees Dr. Fontanez with [facility]- [facility] folow PP    Ingrown toenail     Ovarian cyst     PCOS (polycystic ovarian syndrome) 2022    By US polycystic ovaries and chronic anovulation       Past Surgical History:   Procedure Laterality Date    CERVICAL CHIARI DECOMPRESSION POSTERIOR  10/03/2017    CHOLECYSTECTOMY      COLONOSCOPY N/A 11/15/2021    Procedure: COLONOSCOPY with biopsy;  Surgeon: Khari Carlson MD;  Location: [facility] ENDOSCOPY;  Service: Gastroenterology;  Laterality: N/A;  normal colon    ENDOSCOPY N/A 11/15/2021    Procedure: ESOPHAGOGASTRODUODENOSCOPY;  Surgeon: Khari Carlson MD;  Location: [facility] ENDOSCOPY;  Service: Gastroenterology;  Laterality: N/A;  normal EGD    EYE SURGERY      x2    WISDOM TOOTH EXTRACTION         Social History     Socioeconomic History    Marital status: Single   Tobacco Use    Smoking status: Passive Smoke Exposure - Never Smoker     Passive exposure: Past    Smokeless tobacco: Never   Vaping Use    Vaping status: Never Used   Substance and Sexual Activity    Alcohol use: Not Currently     Comment: 1-2 times a month     Drug use: Never    Sexual activity: Yes     Partners: Male     Birth control/protection: None     Family History   Problem Relation Age of Onset    Diabetes Father         borderline    Hypertension Father     Fibromyalgia Mother     Fibromyalgia Sister     Colon cancer Maternal Aunt 40    Colon cancer Maternal Uncle 30    Ovarian cancer Cousin 25    Malig Hyperthermia Neg Hx     Uterine cancer Neg Hx     Breast cancer Neg Hx     Deep vein thrombosis Neg Hx     Pulmonary embolism Neg Hx       Allergies   Allergen Reactions    Metoclopramide Shortness Of Breath    Retin-A [Tretinoin] Rash      Current Outpatient Medications on File Prior to Visit   Medication Sig Dispense Refill    amoxicillin (AMOXIL) 500 MG capsule Take 1 capsule by mouth Every 8 (Eight) Hours.      folic acid (FOLVITE) 1 MG tablet Take 1 tablet by mouth Daily. 90 tablet 3    ondansetron ODT (ZOFRAN-ODT) 4 MG disintegrating tablet Place 1 tablet on the tongue Every 8 (Eight) Hours As Needed for Nausea or Vomiting. 30 tablet 1    pantoprazole (Protonix) 20 MG EC tablet Take 1 tablet by mouth Daily As Needed for Heartburn. 30 tablet 1    doxylamine (UNISOM) 25 MG tablet Take 1 tablet by mouth At Night As Needed for Sleep or Nausea (or anxiety). May take and extra 1/2 tablet PRN persistent nausea or anxiety (Patient not taking: Reported on 12/20/2024) 30 tablet 1    promethazine (PHENERGAN) 25 MG tablet Take 1 tablet by mouth Every 6 (Six) Hours As Needed for Nausea or Vomiting. (Patient not taking: Reported on 12/6/2024) 30 tablet 1    vitamin B-6 (PYRIDOXINE) 25 MG tablet Take 1 tablet by mouth 2 (Two) Times a Day. For nausea in pregnancy (Patient not taking: Reported on 12/20/2024) 60 tablet 1     No current facility-administered medications on file prior to visit.        Past obstetric, gynecological, medical, surgical, family and social history reviewed.  Relevant lab work and imaging reviewed.    Review of systems  Constitutional:  denies fever,  chills, malaise.   ENT/Mouth:  denies sore throat, tinnitus  Eyes: denies vision changes/pain  CV:  denies chest pain  Respiratory:  denies cough/SOB  GI:  denies N/V, diarrhea, abdominal pain.    :   denies dysuria  Skin:  denies lesions or pruritus   Neuro:  denies weakness, focal neurologic symptoms    Vitals:    24 1222   BP: 111/69   BP Location: Right arm   Patient Position: Sitting   Pulse: 99   Temp: 98.4 °F (36.9 °C)   TempSrc: Temporal       PHYSICAL EXAM   GENERAL: Not in acute distress, AAOx3, pleasant  CARDIO: regular rate and rhythm  PULM: symmetric chest rise, speaking in complete sentences without difficulty  NEURO: awake, alert and oriented to person, place, and time  ABDOMINAL: No fundal tenderness, no rebound or guarding, gravid  EXTREMITIES: no bilateral lower extremity edema/tenderness  SKIN: Warm, well-perfused      ULTRASOUND   Please view full ultrasound note on Imaging tab in ViewPoint.  Breech presentation  Posterior placenta  MVP 3.6 cm which is normal  Fetal biometry is consistent with dates EFW 45%, AC 35%  The fetal anatomic survey is now successfully complete and appears normal  Transabdominal cervical length 3.7 cm which is normal    ASSESSMENT/COUNSELIN y.o.   23w0d gestation (Estimated Date of Delivery: 25).       -Pregnancy  [ X ] stable  [   ] improving [  ] worsening    Diagnoses and all orders for this visit:    1. Intracranial hypertension (Primary)    2. Arnold-Chiari malformation  Overview:  S/p decompression  Surgeon and MFM rec primary CS due to persistent sxs w valsalva        Pseudotumor cerebri  Hx of Chiari malformation s/p decompression  Idiopathic intracranial hypertension (IIH), also sometimes referred to as benign intracranial hypertension (BIH) or pseudotumor cerebri,  is a syndrome of increased intracranial pressure related to elevated cerebrospinal fluid (CSF) pressure but  unrelated to intracranial mass or other pathology.  The rate of  pregnancy complications in patients with IHH is similar to the general population. However, the presence of headache can complicate the diagnosis of complications of hypertensive disorders of pregnancy such as pre-eclampsia.  In general, the management of IIH in pregnancy is the same as for nonpregnant patients.  Serial LP, and medications such as acetazolamide (Diamox) and corticosteroids may be used in pregnancy as clinically indicated.  Regarding anesthesia for delivery, in general regional anesthesia is a reasonable option for patients without prior LP shunt or other complicating factors (pre-delivery anesthesia consult may be beneficial for patient-specific counseling).      She can take diamox if absolutely needed for symptoms, sparingly.      She of note, has a hx of chiari malformation s/p decompression.  She has chronic headaches/pressure that worsens with sneezing/bearing down.  She said in 2017 when she had her decompression, her neurosurgeon told her she would need to have a CS.  We will try to get these records (pt working on it), but given her symptoms with valsalva, I would recommend a primary CS to avoid hindbrain herniation, etc.  She is amenable to this.     Early 2024 LP:  1.Successful fluoroscopically guided lumbar puncture with opening   pressure of 18 cm of water.     Summary of Plan  -Recommend serial growths by 32-36 weeks (by primary OB)   -Careful attention to symptoms--may have to re-evaluate medication if pt becomes more symptomatic.   -Recommend anesthesia consult to be scheduled by primary OB and primary CS for hx of Chiari decompression and significant symptoms with valsalva.    --Follow up with MFM for completion of anatomy.  -Delivery 39th week unless indicated sooner.    Follow-up:No follow up with MFM scheduled, but I am happy to see for follow up at request of primary obstetrician      Thank you for the consult and opportunity to care for this patient.  Please feel free to reach  out with any questions or concerns.      I spent 15 minutes caring for this patient on this date of service. This time includes time spent by me in the following activities: preparing for the visit, reviewing tests, obtaining and/or reviewing a separately obtained history, performing a medically appropriate examination and/or evaluation, counseling and educating the patient/family/caregiver and independently interpreting results and communicating that information with the patient/family/caregiver with greater than 50% spent in counseling and coordination of care.     ANDREA Locke  Maternal Fetal Medicine-[facility]  Office: [phone]

## 2024-12-31 NOTE — PROGRESS NOTES
OB FOLLOW UP      Chief Complaint   Patient presents with    Routine Prenatal Visit     Subjective:   No complaints.  Denies VB, leaking fluid, current regular cramping or contractions.    Objective:  /76   Wt 104 kg (229 lb)   LMP 07/12/2024 (Exact Date)   BMI 39.31 kg/m²  6.35 kg (14 lb) Body mass index is 39.31 kg/m².  Chaperone present during pelvic exam if performed.  See OB flow sheet for fundal height (not performed if US day of OV), FHT, edema, cvx exam if performed, and Upro/Uglu.       Assessment and Plan:  25w0d     Diagnoses and all orders for this visit:    1. Supervision of normal first pregnancy, antepartum (Primary)  Overview:  DEBI finalized: 4/18/24 by LMP and confirmed by 6week US    CF neg.  SMA neg.  NIPS 9/27 insufficient plasma, redraw low risk XY.  AFP neg.    COVID: Recommended, declines  Flu: Vaccinated Nov 2024  Tdap:  Recommended, s/p Rx 1/3/2025     Rhogam:  1hr Glucola: 1/3/2025   FU TPA w glucola: 1/3/2025   ? Desires Sterilization: declines 1/3/2025     Anatomy US: [facility] 12/6/24 Vertex, posterior placenta with placental lake.  Normal growth AC 50%.  Normal anatomy.  Suboptimal cardiac views.  EIF-not clinically significant  FU US: [facility] 12/20/24 breech, posterior placenta, EFW 45%, AC 35%  FU US: [facility] ordered 1/3/2025     PROBLEM LIST/PLAN:   Pseudotumor cerebri with significant symptoms   SHIRLEY Pretty   MF   Serial LP, and medications such as acetazolamide (Diamox) and corticosteroids may be used in pregnancy as clinically indicated   Hx of Chiari malformation s/p decompression- symptoms w valsalva  SHIRLEY Pretty  S/p MFM- completed  Plan primary CS to avoid hindbrain herniation  Anesthesia consult predelivery  Serial US growth at 32weeks [facility]  Del at 39th week  Anxiety/depression- stopped meds w +preg test (Wellbutrin and Buspar).  Stable  Previously discussed importance of mental health and current guidelines to use meds if needed.    Serum positive HSV 1 and 2 (cold sores  only)- plan prophylaxis at 34-36weeks  Elev pregravid BMI 37- plan APT and growth US    PCOS- early glucola 10/25 wnl  +THC- Stopped after positive preg test  Elev BP early on viability OV- FU wnl.      Can't take big pills- rec try w M+Ms, not all pills can be substituted with chewables or small pills    Orders:  -     POC Urinalysis Dipstick  -     Gestational Diabetes Screen 1 Hour; Future  -     RPR, Rfx Qn RPR / Confirm TP  -     CBC (No Diff); Future  -     Cancel: US Ob 14 + Weeks Single or First Gestation; Future  -     US Ob 14 + Weeks Single or First Gestation; Future      Counseling:    OB precautions, leaking, VB, adalid jerome vs PTL/Labor  [facility]    Reassuring pregnancy progress. Questions answered.  Continue PNV.  Importance of healthy eating, obtaining sufficient sleep, and staying active unless hypertensive- activity modified as directed.    Return in about 3 weeks (around 1/24/2025) for FU OB w rhogam, then q 2weeks.  US in 7weeks.            Indira Ruiz,   01/03/2025    [facility] [place] [facility]  [address] [place] [address]  Dept: [phone]  Dept Fax: [phone]  Loc: [phone]  Loc Fax: [phone]

## 2025-01-03 ENCOUNTER — ROUTINE PRENATAL (OUTPATIENT)
Dept: OBSTETRICS AND GYNECOLOGY | Facility: CLINIC | Age: 30
End: 2025-01-03
Payer: COMMERCIAL

## 2025-01-03 VITALS — SYSTOLIC BLOOD PRESSURE: 122 MMHG | WEIGHT: 229 LBS | BODY MASS INDEX: 39.31 KG/M2 | DIASTOLIC BLOOD PRESSURE: 76 MMHG

## 2025-01-03 DIAGNOSIS — Z34.00 SUPERVISION OF NORMAL FIRST PREGNANCY, ANTEPARTUM: Primary | ICD-10-CM

## 2025-01-03 LAB
DEPRECATED RDW RBC AUTO: 41 FL (ref 37–54)
ERYTHROCYTE [DISTWIDTH] IN BLOOD BY AUTOMATED COUNT: 12.8 % (ref 12.3–15.4)
GLUCOSE 1H P GLC SERPL-MCNC: 114 MG/DL (ref 65–139)
GLUCOSE UR STRIP-MCNC: NEGATIVE MG/DL
HCT VFR BLD AUTO: 35.9 % (ref 34–46.6)
HGB BLD-MCNC: 11.6 G/DL (ref 12–15.9)
MCH RBC QN AUTO: 28.4 PG (ref 26.6–33)
MCHC RBC AUTO-ENTMCNC: 32.3 G/DL (ref 31.5–35.7)
MCV RBC AUTO: 88 FL (ref 79–97)
PLATELET # BLD AUTO: 273 10*3/MM3 (ref 140–450)
PMV BLD AUTO: 11.5 FL (ref 6–12)
PROT UR STRIP-MCNC: NEGATIVE MG/DL
RBC # BLD AUTO: 4.08 10*6/MM3 (ref 3.77–5.28)
WBC NRBC COR # BLD AUTO: 14.44 10*3/MM3 (ref 3.4–10.8)

## 2025-01-03 PROCEDURE — 85027 COMPLETE CBC AUTOMATED: CPT | Performed by: OBSTETRICS & GYNECOLOGY

## 2025-01-03 PROCEDURE — 86592 SYPHILIS TEST NON-TREP QUAL: CPT | Performed by: OBSTETRICS & GYNECOLOGY

## 2025-01-03 PROCEDURE — 82950 GLUCOSE TEST: CPT | Performed by: OBSTETRICS & GYNECOLOGY

## 2025-01-04 LAB — RPR SER QL: NON REACTIVE

## 2025-01-12 ENCOUNTER — PATIENT MESSAGE (OUTPATIENT)
Dept: OBSTETRICS AND GYNECOLOGY | Facility: CLINIC | Age: 30
End: 2025-01-12

## 2025-01-13 DIAGNOSIS — M54.32 SCIATICA OF LEFT SIDE: Primary | ICD-10-CM

## 2025-01-13 NOTE — PROGRESS NOTES
OB FOLLOW UP      Chief Complaint   Patient presents with    Routine Prenatal Visit     Subjective:   June was just seen for her routine OB visit on the third.  This is an extra office visit secondary to possible sciatica.  She has had a history of sciatic prior to pregnancy.  She has seen physical therapy in the past and has done sciatic stretches, used heat with no relief.  It is all on the left side and starts in the buttock area just lateral to her gluteal cleft and goes down into her leg.  Denies weakness or loss of sensation.    Denies VB, leaking fluid, current regular cramping or contractions.    Objective:  /78   Wt 103 kg (226 lb)   LMP 07/12/2024 (Exact Date)   BMI 38.79 kg/m²  4.99 kg (11 lb) Body mass index is 38.79 kg/m².  Chaperone present during pelvic exam if performed.  See OB flow sheet for fundal height (not performed if US day of OV), FHT, edema, cvx exam if performed, and Upro/Uglu.     Tenderness over left SI joint.  Patient is able to walk without assistance.      Assessment and Plan:  26w6d     Diagnoses and all orders for this visit:    1. Supervision of normal first pregnancy, antepartum (Primary)  Overview:  DEBI finalized: 4/18/24 by LMP and confirmed by 6week US    CF neg.  SMA neg.  NIPS 9/27 insufficient plasma, redraw low risk XY.  AFP neg.    COVID: Recommended, declines  Flu: Vaccinated Nov 2024  Tdap:  Recommended, s/p Rx 1/3/2025     Rhogam:  1hr Glucola: 114  FU TPA w glucola: neg  ? Desires Sterilization: declines 1/3/2025     Anatomy US: [facility] 12/6/24 Vertex, posterior placenta with placental lake.  Normal growth AC 50%.  Normal anatomy.  Suboptimal cardiac views.  EIF-not clinically significant  FU US: [facility] 12/20/24 breech, posterior placenta, EFW 45%, AC 35%  FU US: [facility] ordered 1/3/2025     PROBLEM LIST/PLAN:   Pseudotumor cerebri with significant symptoms   SHIRLEY Pretty   [facility]   Serial LP, and medications such as acetazolamide (Diamox) and corticosteroids may be  used in pregnancy as clinically indicated   Hx of Chiari malformation s/p decompression- symptoms w valsalva  NS Pretty  S/p MFM- completed  Plan primary CS to avoid hindbrain herniation  Anesthesia consult predelivery  Serial US growth at 32weeks [facility]  Del at 39th week  Anxiety/depression- stopped meds w +preg test (Wellbutrin and Buspar).  Stable  Previously discussed importance of mental health and current guidelines to use meds if needed.    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      Can't take big pills- rec try w M+Ms, not all pills can be substituted with chewables or small pills    Orders:  -     POC Urinalysis Dipstick    2. Sciatica of left side  Overview:  1/13/2025 consult PT    Orders:  -     Ambulatory Referral to Physical Therapy for Evaluation & Treatment    3. Disorder of SI (sacroiliac) joint  -     Ambulatory Referral to Physical Therapy for Evaluation & Treatment      Counseling:    OB precautions, leaking, VB, adalid jerome vs PTL/Labor  [facility]  We discussed option of work restrictions today, she declines  Paperwork provided to obtain handicap parking pass as needed in her pregnancy.  It is reasonable for remainder of pregnancy and up to a month postpartum.      Reassuring pregnancy progress. Questions answered.  Continue PNV.  Importance of healthy eating, obtaining sufficient sleep, and staying active unless hypertensive- activity modified as directed.    Return for KEEP FU as scheduled.            Indira Ruiz DO  01/16/2025    [facility] [place] [facility]  [address] [place] [address]  Dept: [phone]  Dept Fax: [phone]  Loc: [phone]  Loc Fax: [phone]

## 2025-01-16 ENCOUNTER — ROUTINE PRENATAL (OUTPATIENT)
Dept: OBSTETRICS AND GYNECOLOGY | Facility: CLINIC | Age: 30
End: 2025-01-16
Payer: COMMERCIAL

## 2025-01-16 VITALS — DIASTOLIC BLOOD PRESSURE: 78 MMHG | WEIGHT: 226 LBS | BODY MASS INDEX: 38.79 KG/M2 | SYSTOLIC BLOOD PRESSURE: 121 MMHG

## 2025-01-16 DIAGNOSIS — M54.32 SCIATICA OF LEFT SIDE: ICD-10-CM

## 2025-01-16 DIAGNOSIS — M53.3 DISORDER OF SI (SACROILIAC) JOINT: ICD-10-CM

## 2025-01-16 DIAGNOSIS — Z34.00 SUPERVISION OF NORMAL FIRST PREGNANCY, ANTEPARTUM: Primary | ICD-10-CM

## 2025-01-16 LAB
GLUCOSE UR STRIP-MCNC: NEGATIVE MG/DL
PROT UR STRIP-MCNC: NEGATIVE MG/DL

## 2025-01-17 PROBLEM — M53.3 DISORDER OF SI (SACROILIAC) JOINT: Status: ACTIVE | Noted: 2025-01-17

## 2025-01-24 ENCOUNTER — ROUTINE PRENATAL (OUTPATIENT)
Dept: OBSTETRICS AND GYNECOLOGY | Facility: CLINIC | Age: 30
End: 2025-01-24
Payer: COMMERCIAL

## 2025-01-24 VITALS — BODY MASS INDEX: 40.17 KG/M2 | DIASTOLIC BLOOD PRESSURE: 78 MMHG | SYSTOLIC BLOOD PRESSURE: 113 MMHG | WEIGHT: 234 LBS

## 2025-01-24 DIAGNOSIS — O26.899 RH NEGATIVE, ANTEPARTUM: ICD-10-CM

## 2025-01-24 DIAGNOSIS — Z34.00 SUPERVISION OF NORMAL FIRST PREGNANCY, ANTEPARTUM: Primary | ICD-10-CM

## 2025-01-24 DIAGNOSIS — Q07.00 ARNOLD-CHIARI MALFORMATION: ICD-10-CM

## 2025-01-24 DIAGNOSIS — Z23 NEED FOR TDAP VACCINATION: Primary | ICD-10-CM

## 2025-01-24 DIAGNOSIS — O99.210 OBESITY IN PREGNANCY, ANTEPARTUM: ICD-10-CM

## 2025-01-24 DIAGNOSIS — Z67.91 RH NEGATIVE, ANTEPARTUM: ICD-10-CM

## 2025-01-24 LAB
ABO GROUP BLD: NORMAL
BLD GP AB SCN SERPL QL: NEGATIVE
GLUCOSE UR STRIP-MCNC: NEGATIVE MG/DL
NUMBER OF DOSES: ABNORMAL
PROT UR STRIP-MCNC: NEGATIVE MG/DL
RH BLD: NEGATIVE

## 2025-01-24 PROCEDURE — 86901 BLOOD TYPING SEROLOGIC RH(D): CPT | Performed by: OBSTETRICS & GYNECOLOGY

## 2025-01-24 PROCEDURE — 86900 BLOOD TYPING SEROLOGIC ABO: CPT | Performed by: OBSTETRICS & GYNECOLOGY

## 2025-01-24 PROCEDURE — 86850 RBC ANTIBODY SCREEN: CPT | Performed by: OBSTETRICS & GYNECOLOGY

## 2025-01-24 PROCEDURE — 90715 TDAP VACCINE 7 YRS/> IM: CPT | Performed by: FAMILY MEDICINE

## 2025-01-24 PROCEDURE — 90471 IMMUNIZATION ADMIN: CPT | Performed by: FAMILY MEDICINE

## 2025-01-24 NOTE — PROGRESS NOTES
Routine Prenatal Visit     Subjective  Mima Wolfe is a 29 y.o.  at 28w0d here for her routine OB visit.   She is taking her prenatal vitamins.Reports no loss of fluid or vaginal bleeding. Patient doing well without any complaints. Pregnancy complicated by:     Patient Active Problem List   Diagnosis    Family history of colon cancer    Class 1 obesity without serious comorbidity with body mass index (BMI) of 34.0 to 34.9 in adult    Family history of ovarian cancer    Nausea and vomiting    Difficulty swallowing pills    Elevated blood pressure reading    Pseudotumor cerebri    HSV serum positive I and II    Anxiety and depression    Obesity in pregnancy, antepartum    Supervision of normal first pregnancy, antepartum    Marijuana use during pregnancy    Arnold-Chiari malformation    Gastroesophageal reflux disease without esophagitis    Sciatica of left side    Disorder of SI (sacroiliac) joint         OB History    Para Term  AB Living   1 0 0 0 0 0   SAB IAB Ectopic Molar Multiple Live Births   0 0 0 0 0 0      # Outcome Date GA Lbr Ken[place]/2nd Weight Sex Type Anes PTL Lv   1 Current                    ROS:   Review of Systems - General ROS: negative  Psychological ROS: negative  Endocrine ROS: negative  Breast ROS: negative  Respiratory ROS: negative  Cardiovascular ROS: negative  Gastrointestinal ROS: negative  Genito-Urinary ROS: negative  Musculoskeletal ROS: negative  Neurological ROS: negative  Dermatological ROS: negative    Objective  Physical Exam:   Vitals:    25 1406   BP: 113/78       Uterine Size: size greater than dates  FHT: 110-160 BPM    General appearance - alert, well appearing, and in no distress  Mental status - alert, oriented to person, place, and time  Abdomen- Soft, Gravid uterus, non-tender to palpation  Musculoskeletal: negative for - gait disturbance or joint pain  Extremeties: negative swelling or cyanosis   Dermatological: negative rashes or skin lesions        Assessment/Plan:   Diagnoses and all orders for this visit:    1. Supervision of normal first pregnancy, antepartum (Primary)  Assessment & Plan:  PROBLEM LIST/PLAN:   Pseudotumor cerebri with significant symptoms   NS Sukhwinder   MFM   Serial LP, and medications such as acetazolamide (Diamox) and corticosteroids may be used in pregnancy as clinically indicated   Hx of Chiari malformation s/p decompression- symptoms w valsalva  NS Sukhwinder  S/p MFM- completed  Plan primary CS to avoid hindbrain herniation  Anesthesia consult predelivery  Serial US growth at 32weeks [facility]  Del at 39th week  Anxiety/depression- stopped meds w +preg test (Wellbutrin and Buspar).  Stable  Previously discussed importance of mental health and current guidelines to use meds if needed.    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      Can't take big pills- rec try w M+Ms, not all pills can be substituted with chewables or small pills    Orders:  -     POC Urinalysis Dipstick    2. Obesity in pregnancy, antepartum  Comments:  ASA 81mg daily    3. Rh negative, antepartum  Comments:  Rhogam recieved today  Orders:  -     Rhogam Immune Globulin Immunization  -      RhIg Evaluation  -     Doses of rh immune globulin    4. Arnold-Chiari malformation  Assessment & Plan:  S/p decompression  Surgeon and MFM rec primary CS due to persistent sxs w valsalva              Counseling:   OB precautions, leaking, VB, adalid jerome vs PTL/Labor  [facility]  HTN precautions reviewed: HA, vision change, RUQ/epigastric pain, edema  Round Ligament Pain:  The uterus has several ligaments which provide support and keep the uterus in place. As the  uterus grows these ligaments are pulled and stretched which often causes sharp stabbing like pain in the inguinal area.   You  may find a pregnancy support band helpful. Changing positions may also help. Yoga is a great way to cope with round ligament and low back pain in pregnancy.    Massage may also help with low back pain   Things to Consider at this Point in your Pregnancy:  Some women experience swelling in their feet during pregnancy. Compression stockings may help  Drink plenty of water and stay active   Make sure you are eating frequent small meals, nuts are a wonderful snack to keep with you            Return in about 2 weeks (around 2/7/2025) for Routine OB visit.      We have gone over prenatal care to include the timing and content of visits. I informed her how to contact the office and/or on call person in the event of any problems and encouraged her to do so when she feels it is necessary.  We then spent time answering her questions which she indicated were answered to her satisfaction.    Sary Flor DO  1/24/2025 14:11 EST

## 2025-01-24 NOTE — ASSESSMENT & PLAN NOTE
PROBLEM LIST/PLAN:   Pseudotumor cerebri with significant symptoms   NS Pretty   MFM   Serial LP, and medications such as acetazolamide (Diamox) and corticosteroids may be used in pregnancy as clinically indicated   Hx of Chiari malformation s/p decompression- symptoms w valsalva  NS Pretty  S/p MFM- completed  Plan primary CS to avoid hindbrain herniation  Anesthesia consult predelivery  Serial US growth at 32weeks [facility]  Del at 39th week  Anxiety/depression- stopped meds w +preg test (Wellbutrin and Buspar).  Stable  Previously discussed importance of mental health and current guidelines to use meds if needed.    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      Can't take big pills- rec try w M+Ms, not all pills can be substituted with chewables or small pills

## 2025-02-05 NOTE — PROGRESS NOTES
OB FOLLOW UP      Chief Complaint   Patient presents with    Routine Prenatal Visit     Subjective:   No complaints.  Denies VB, leaking fluid, current regular cramping or contractions.    PT going well    Objective:  /82   Wt 107 kg (235 lb)   LMP 07/12/2024 (Exact Date)   BMI 40.34 kg/m²  9.072 kg (20 lb) Body mass index is 40.34 kg/m².  Chaperone present during pelvic exam if performed.  See OB flow sheet for fundal height (not performed if US day of OV), FHT, edema, cvx exam if performed, and Upro/Uglu.       Assessment and Plan:  30w0d     Diagnoses and all orders for this visit:    1. Supervision of normal first pregnancy, antepartum (Primary)  Overview:  DEBI finalized: 4/18/24 by LMP and confirmed by 6week US    CF neg.  SMA neg.  NIPS 9/27 insufficient plasma, redraw low risk XY.  AFP neg.    COVID: Recommended, declines  Flu: Vaccinated Nov 2024  Tdap:  Vaccinated    Rhogam: received 1/24/25 @28wks  1hr Glucola: 114  FU TPA w glucola: neg  ? Desires Sterilization: declined    Anatomy US: [facility] 12/6/24 Vertex, posterior placenta with placental lake.  Normal growth AC 50%.  Normal anatomy.  Suboptimal cardiac views.  EIF-not clinically significant  FU US: [facility] 12/20/24 breech, posterior placenta, EFW 45%, AC 35%  FU US: [facility] ordered 1/3/2025     PROBLEM LIST/PLAN:   Pseudotumor cerebri with significant symptoms   SHIRLEY Pretty   MF   Serial LP, and medications such as acetazolamide (Diamox) and corticosteroids may be used in pregnancy as clinically indicated   Hx of Chiari malformation s/p decompression- symptoms w valsalva  SHIRLEY Pretty  S/p MFM- completed  Plan primary CS to avoid hindbrain herniation  Anesthesia consult predelivery  Serial US growth at 32weeks [facility]  Del at 39th week  Anxiety/depression- stopped meds w +preg test (Wellbutrin and Buspar).  Stable  Previously discussed importance of mental health and current guidelines to use meds if needed.    Serum positive HSV 1 and 2 (cold sores  only)- plan prophylaxis at 34-36weeks  Elev pregravid BMI 37- plan APT and growth     PCOS- early glucola 10/25 wnl  +THC- Stopped after positive preg test  Elev BP early on viability OV- FU wnl.      Can't take big pills- rec try w M+Ms, not all pills can be substituted with chewables or small pills    Orders:  -     POC Urinalysis Dipstick      Counseling:    OB precautions, leaking, VB, adalid jerome vs PTL/Labor  FKC    Reassuring pregnancy progress. Questions answered.  Continue PNV.  Importance of healthy eating, obtaining sufficient sleep, and staying active unless hypertensive- activity modified as directed.    Return in about 2 weeks (around 2/21/2025) for FU OB q2wks to 36weeks, next one w US ordered 1/3/25.            Indira Ruiz, DO  02/07/2025    [facility] [place] [facility]  [address] [place] [address]  Dept: [phone]  Dept Fax: [phone]  Loc: [phone]  Loc Fax: [phone]

## 2025-02-07 ENCOUNTER — ROUTINE PRENATAL (OUTPATIENT)
Dept: OBSTETRICS AND GYNECOLOGY | Facility: CLINIC | Age: 30
End: 2025-02-07
Payer: COMMERCIAL

## 2025-02-07 VITALS — WEIGHT: 235 LBS | BODY MASS INDEX: 40.34 KG/M2 | DIASTOLIC BLOOD PRESSURE: 82 MMHG | SYSTOLIC BLOOD PRESSURE: 118 MMHG

## 2025-02-07 DIAGNOSIS — Z34.00 SUPERVISION OF NORMAL FIRST PREGNANCY, ANTEPARTUM: Primary | ICD-10-CM

## 2025-02-07 LAB
GLUCOSE UR STRIP-MCNC: NEGATIVE MG/DL
PROT UR STRIP-MCNC: NEGATIVE MG/DL

## 2025-02-10 ENCOUNTER — PATIENT MESSAGE (OUTPATIENT)
Dept: OBSTETRICS AND GYNECOLOGY | Facility: CLINIC | Age: 30
End: 2025-02-10

## 2025-02-10 DIAGNOSIS — L29.9 ITCHING: Primary | ICD-10-CM

## 2025-02-10 RX ORDER — OSELTAMIVIR PHOSPHATE 75 MG/1
75 CAPSULE ORAL 2 TIMES DAILY
Qty: 10 CAPSULE | Refills: 0 | Status: SHIPPED | OUTPATIENT
Start: 2025-02-10

## 2025-02-18 ENCOUNTER — ROUTINE PRENATAL (OUTPATIENT)
Dept: OBSTETRICS AND GYNECOLOGY | Facility: CLINIC | Age: 30
End: 2025-02-18
Payer: COMMERCIAL

## 2025-02-18 VITALS — DIASTOLIC BLOOD PRESSURE: 86 MMHG | BODY MASS INDEX: 40.17 KG/M2 | SYSTOLIC BLOOD PRESSURE: 124 MMHG | WEIGHT: 234 LBS

## 2025-02-18 DIAGNOSIS — R03.0 ELEVATED BLOOD PRESSURE READING: ICD-10-CM

## 2025-02-18 DIAGNOSIS — Z34.00 SUPERVISION OF NORMAL FIRST PREGNANCY, ANTEPARTUM: Primary | ICD-10-CM

## 2025-02-18 LAB
GLUCOSE UR STRIP-MCNC: NEGATIVE MG/DL
PROT UR STRIP-MCNC: NEGATIVE MG/DL

## 2025-02-18 NOTE — PROGRESS NOTES
OB FOLLOW UP      Chief Complaint   Patient presents with    Routine Prenatal Visit     Subjective:   No complaints.  Denies VB, leaking fluid, current regular cramping or contractions.  Denies HA, vision changes/scotomata, RUQ/epigastric pain or UE/facial edema.  No more itching, has not gotten fasting bile acids yet, but plans on it    Objective:  /86   Wt 106 kg (234 lb)   LMP 07/12/2024 (Exact Date)   BMI 40.17 kg/m²  8.618 kg (19 lb) Body mass index is 40.17 kg/m².  Chaperone present during pelvic exam if performed.  See OB flow sheet for fundal height (not performed if US day of OV), FHT, edema, cvx exam if performed, and Upro/Uglu.       Assessment and Plan:  31w4d     Diagnoses and all orders for this visit:    1. Supervision of normal first pregnancy, antepartum (Primary)  Overview:  DEBI finalized: 4/18/24 by LMP and confirmed by 6week US    CF neg.  SMA neg.  NIPS 9/27 insufficient plasma, redraw low risk XY.  AFP neg.    COVID: Recommended, declines  Flu: Vaccinated Nov 2024  Tdap:  Vaccinated    Rhogam: received 1/24/25 @28wks  1hr Glucola: 114  FU TPA w glucola: neg  ? Desires Sterilization: declined    Anatomy US: [facility] 12/6/24 Vertex, posterior placenta with placental lake.  Normal growth AC 50%.  Normal anatomy.  Suboptimal cardiac views.  EIF-not clinically significant  FU US: [facility] 12/20/24 breech, posterior placenta, EFW 45%, AC 35%  FU US: [facility] 2/18/25 4#11oz, 84%,  AC 66%,  NIA 15, VTX, gd1 placenta  FU US: ordered 2/18/2025     PROBLEM LIST/PLAN:   Pseudotumor cerebri with significant symptoms   SHIRLEY Pretty   MFM- Serial LP, and medications such as acetazolamide (Diamox) and corticosteroids may be used in pregnancy as clinically indicated   Hx of Chiari malformation s/p decompression- symptoms w valsalva  SHIRLEY Pretty  S/p MFM- completed  Plan primary CS to avoid hindbrain herniation  Anesthesia consult predelivery  Serial US growth   Del at 39th week  Anxiety/depression- stopped meds w  +preg test (Wellbutrin and Buspar).  Stable  Serum positive HSV 1 and 2 (cold sores only)- plan prophylaxis at 34-36weeks  Elev pregravid BMI 37- plan APT by 37wk and growth US    PCOS- early glucola 10/25 wnl  +THC- Stopped after positive preg test  Elev BP early on viability OV- FU wnl.      Can't take big pills- rec try w M+Ms, not all pills can be substituted with chewables or small pills    Orders:  -     POC Urinalysis Dipstick  -     US Ob 14 + Weeks Single or First Gestation; Future    2. Elevated blood pressure reading  Overview:  2/18/2025 mild range, FU wnl  Rec check BP at work.  To L+D for systolic 140 above, diastolic 90 or above, or hypertensive symptoms.        Counseling:    OB precautions, leaking, VB, adalid jerome vs PTL/Labor  [facility]  HTN precautions reviewed: HA, vision change, RUQ/epigastric pain, edema  Will discuss with CRNA's on labor and delivery regarding anesthesia consult      Reassuring pregnancy progress. Questions answered.  Continue PNV.  Importance of healthy eating, obtaining sufficient sleep, and staying active unless hypertensive- activity modified as directed.    Return in about 2 weeks (around 3/4/2025) for FU OB q2wks to 36weeks, US 4weeks.            Indira Ruiz,   02/18/2025    [facility] [place] [facility]  [address] [place] [address]  Dept: [phone]  Dept Fax: [phone]  Loc: [phone]  Loc Fax: [phone]

## 2025-02-19 ENCOUNTER — CLINICAL SUPPORT (OUTPATIENT)
Dept: FAMILY MEDICINE CLINIC | Facility: CLINIC | Age: 30
End: 2025-02-19
Payer: COMMERCIAL

## 2025-02-19 DIAGNOSIS — L29.9 ITCHING: ICD-10-CM

## 2025-02-19 LAB — BILE AC SERPL-SCNC: 6 UMOL/L (ref 0–10)

## 2025-02-19 PROCEDURE — 82239 BILE ACIDS TOTAL: CPT | Performed by: OBSTETRICS & GYNECOLOGY

## 2025-02-20 NOTE — CONSULTS
I spoke with Ms. Mima Aiden and we discussed her surgical repair of her Arnold Chiari Malformation.  She is currently asymptomatic and sees a neurosurgeon for follow-up only.  We discussed spinal anesthesia and epidural anesthesia which both carry mild risk but are both appropriate in her situation.  She is going to contact her NS and ask if she can be cleared for either and which they prefer.

## 2025-02-26 ENCOUNTER — TELEPHONE (OUTPATIENT)
Dept: OBSTETRICS AND GYNECOLOGY | Facility: CLINIC | Age: 30
End: 2025-02-26
Payer: COMMERCIAL

## 2025-02-26 NOTE — TELEPHONE ENCOUNTER
I spoke to Mima, she had been scheduled on tomorrow.  Checked with Kandice, since tomorrow works better for the pt, it is ok for her appointment to stay there.  Pt is aware she can come tomorrow instead.

## 2025-02-26 NOTE — TELEPHONE ENCOUNTER
Patient returned call and advised could come today at 3pm.  Advised pt would let CR know and she would be moved to 3pm today.

## 2025-02-26 NOTE — TELEPHONE ENCOUNTER
HB TO READ - transfer to office to [place]:    By direction of  CR:  Patient 3/3 pm OB follow up was cancelled - was called to see if could come in today at 3pm    Patient is going to check with her work and call me back.

## 2025-02-27 ENCOUNTER — ROUTINE PRENATAL (OUTPATIENT)
Dept: OBSTETRICS AND GYNECOLOGY | Facility: CLINIC | Age: 30
End: 2025-02-27
Payer: COMMERCIAL

## 2025-02-27 VITALS — SYSTOLIC BLOOD PRESSURE: 116 MMHG | BODY MASS INDEX: 40.68 KG/M2 | WEIGHT: 237 LBS | DIASTOLIC BLOOD PRESSURE: 77 MMHG

## 2025-02-27 DIAGNOSIS — Z34.00 SUPERVISION OF NORMAL FIRST PREGNANCY, ANTEPARTUM: Primary | ICD-10-CM

## 2025-02-27 LAB
GLUCOSE UR STRIP-MCNC: NEGATIVE MG/DL
PROT UR STRIP-MCNC: NEGATIVE MG/DL

## 2025-02-27 RX ORDER — VALACYCLOVIR HYDROCHLORIDE 1 G/1
1000 TABLET, FILM COATED ORAL DAILY
Qty: 30 TABLET | Refills: 2 | Status: SHIPPED | OUTPATIENT
Start: 2025-02-27

## 2025-02-27 NOTE — PROGRESS NOTES
OB FOLLOW UP      Chief Complaint   Patient presents with    Routine Prenatal Visit     Subjective:   No complaints.  Denies VB, leaking fluid, current regular cramping or contractions.    Objective:  /77   Wt 108 kg (237 lb)   LMP 07/12/2024 (Exact Date)   BMI 40.68 kg/m²  9.979 kg (22 lb) Body mass index is 40.68 kg/m².  Chaperone present during pelvic exam if performed.  See OB flow sheet for fundal height (not performed if US day of OV), FHT, edema, cvx exam if performed, and Upro/Uglu.       Assessment and Plan:  32w6d     Diagnoses and all orders for this visit:    1. Supervision of normal first pregnancy, antepartum (Primary)  Overview:  DEBI finalized: 4/18/24 by LMP and confirmed by 6week US    CF neg.  SMA neg.  NIPS 9/27 insufficient plasma, redraw low risk XY.  AFP neg.    COVID: Recommended, declines  Flu: Vaccinated Nov 2024  Tdap:  Vaccinated    Rhogam: received 1/24/25 @28wks  1hr Glucola: 114  FU TPA w glucola: neg  ? Desires Sterilization: declined    Anatomy US: [facility] 12/6/24 Vertex, posterior placenta with placental lake.  Normal growth AC 50%.  Normal anatomy.  Suboptimal cardiac views.  EIF-not clinically significant  FU US: [facility] 12/20/24 breech, posterior placenta, EFW 45%, AC 35%  FU US: [facility] 2/18/25 4#11oz, 84%,  AC 66%,  NIA 15, VTX, gd1 placenta  FU US: [facility] 3/17/25    PROBLEM LIST/PLAN:   Pseudotumor cerebri with significant symptoms   SHIRLEY Pretty   MFM- Serial LP, and medications such as acetazolamide (Diamox) and corticosteroids may be used in pregnancy as clinically indicated   Hx of Chiari malformation s/p decompression- symptoms w valsalva  SHIRLEY Pretty  S/p MFM- completed  Plan primary CS to avoid hindbrain herniation  S/p Anesthesia consult predelivery- planning spinal  Serial US growth   Del at 39th week  Anxiety/depression- stopped meds w +preg test (Wellbutrin and Buspar).  Stable  Serum positive HSV 1 and 2 (cold sores only)- plan prophylaxis at 34-36weeks  Elev  pregravid BMI 37- plan APT by 37wk and growth US    PCOS- early glucola 10/25 wnl  +THC- Stopped after positive preg test  Elev BP early on viability OV- FU wnl.      Orders:  -     valACYclovir (VALTREX) 1000 MG tablet; Take 1 tablet by mouth Daily.  Dispense: 30 tablet; Refill: 2  -     POC Urinalysis Dipstick      Counseling:    OB precautions, leaking, VB, adalid jerome vs PTL/Labor  [facility]  CS scheduled      Reassuring pregnancy progress. Continue PNV.      Return in about 2 weeks (around 3/13/2025) for FU OB, then weekly to CS.            Indira Ruiz, DO  02/27/2025    [facility] [place] [facility]  [facility] [place] [address]  Dept: [phone]  Dept Fax: [phone]  Loc: [phone]  Loc Fax: [phone]

## 2025-03-14 NOTE — PROGRESS NOTES
OB FOLLOW UP      Chief Complaint   Patient presents with    Routine Prenatal Visit     Subjective:   Denies VB, leaking fluid, current regular cramping or contractions.    Knot in groin, not painful    Objective:  /84   Wt 108 kg (238 lb)   LMP 07/12/2024 (Exact Date)   BMI 40.85 kg/m²  10.4 kg (23 lb) Body mass index is 40.85 kg/m².  Chaperone present during pelvic exam if performed.  See OB flow sheet for fundal height (not performed if US day of OV), FHT, edema, cvx exam if performed, and Upro/Uglu.   GBS RV swab performed today  Right groin 1.5cm, deep firm, mobile, nontender lesion.  No associated fluctuance, no erythema or heat.    Assessment and Plan:  35w3d     Diagnoses and all orders for this visit:    1. Supervision of normal first pregnancy, antepartum (Primary)  Overview:  DEBI finalized: 4/18/24 by LMP and confirmed by 6week US    CF neg.  SMA neg.  NIPS 9/27 insufficient plasma, redraw low risk XY.  AFP neg.    COVID: Recommended, declines  Flu: Vaccinated Nov 2024  Tdap:  Vaccinated    Rhogam: received 1/24/25 @28wks  1hr Glucola: 114  FU TPA w glucola: neg  ? Desires Sterilization: declined    Anatomy US: [facility] 12/6/24 Vertex, posterior placenta with placental lake.  Normal growth AC 50%.  Normal anatomy.  Suboptimal cardiac views.  EIF-not clinically significant  FU US: [facility] 12/20/24 breech, posterior placenta, EFW 45%, AC 35%  FU US: [facility] 2/18/25 4#11oz, 84%,  AC 66%,  NIA 15, VTX, gd1 placenta  FU US: [facility] 3/17/25 6#14oz, 88%, AC 82%, NIA 15, VTX, g2 placenta    PROBLEM LIST/PLAN:   Pseudotumor cerebri with significant symptoms   SHIRLEY Pretty   M- Serial LP, and medications such as acetazolamide (Diamox) and corticosteroids may be used in pregnancy as clinically indicated   Hx of Chiari malformation s/p decompression- symptoms w valsalva  SHIRLEY Pretty  S/p MFM- completed  Plan primary CS to avoid hindbrain herniation  S/p Anesthesia consult predelivery- planning spinal  Serial US growth  - completed  Del at 39th week  Anxiety/depression- stopped meds w +preg test (Wellbutrin and Buspar).  Stable  Cold sores and serum positive HSV 1 and 2 - Valtrex 1000mg daily- continue  Elev pregravid BMI 37- plan APT by 37wk and growth US.  Scheduled 3/17/2025     PCOS- early glucola 10/25 wnl  +THC- Stopped after positive preg test  Elev BP early on viability OV- FU wnl.      Orders:  -     POC Urinalysis Dipstick  -     Group B Strep Molecular by PCR - Swab, Vaginal/Rectum; Future  -     Group B Strep Molecular by PCR - Swab, Vaginal/Rectum    2. Obesity in pregnancy, antepartum  -     Fetal Nonstress Test; Standing    3. Groin lesion  Comments:  Possible lymph node, epithelial inclusion cyst, no evidence of infection today  Overview:  3/17/2025 Right groin, 1.5cm, firm mobile, non tender.  If persists PP, plan GS consult        Counseling:    OB precautions, leaking, VB, adalid jerome vs PTL/Labor  FKC  EDEMA of lower extremities in pregnancy reviewed.  We discussed conservative options to include dietary changes, elevation, and compression stockings.        Reassuring pregnancy progress. Continue PNV.      Return in about 1 week (around 3/24/2025) for FU OB q1wk to DEBI/Delivery, Schedule NSTs weekly starting in next 7days.            Indira Ruiz, DO  03/17/2025    [facility]  [address] [place] [address]  Dept: [phone]  Dept Fax: [phone]  Loc: [phone]  Loc Fax: [phone]

## 2025-03-16 PROBLEM — O99.210 OBESITY IN PREGNANCY, ANTEPARTUM: Status: ACTIVE | Noted: 2022-09-30

## 2025-03-17 ENCOUNTER — ROUTINE PRENATAL (OUTPATIENT)
Dept: OBSTETRICS AND GYNECOLOGY | Facility: CLINIC | Age: 30
End: 2025-03-17
Payer: COMMERCIAL

## 2025-03-17 VITALS — WEIGHT: 238 LBS | DIASTOLIC BLOOD PRESSURE: 84 MMHG | BODY MASS INDEX: 40.85 KG/M2 | SYSTOLIC BLOOD PRESSURE: 132 MMHG

## 2025-03-17 DIAGNOSIS — L98.9 SKIN LESION: ICD-10-CM

## 2025-03-17 DIAGNOSIS — Z34.00 SUPERVISION OF NORMAL FIRST PREGNANCY, ANTEPARTUM: Primary | ICD-10-CM

## 2025-03-17 DIAGNOSIS — O99.210 OBESITY IN PREGNANCY, ANTEPARTUM: ICD-10-CM

## 2025-03-17 LAB
GLUCOSE UR STRIP-MCNC: NEGATIVE MG/DL
PROT UR STRIP-MCNC: NEGATIVE MG/DL

## 2025-03-17 PROCEDURE — 87653 STREP B DNA AMP PROBE: CPT | Performed by: OBSTETRICS & GYNECOLOGY

## 2025-03-18 LAB — GP B STREP DNA VAG+RECTUM QL NAA+PROBE: POSITIVE

## 2025-03-21 NOTE — PROGRESS NOTES
OB FOLLOW UP      Chief Complaint   Patient presents with    Routine Prenatal Visit     Subjective:   No complaints.  Denies VB, leaking fluid, current regular cramping or contractions.    Objective:  /68   Wt 110 kg (243 lb)   LMP 07/12/2024 (Exact Date)   BMI 41.71 kg/m²  12.7 kg (28 lb) Body mass index is 41.71 kg/m².  Chaperone present during pelvic exam if performed.  See OB flow sheet for fundal height (not performed if US day of OV), FHT, edema, cvx exam if performed, and Upro/Uglu.       Assessment and Plan:  36w3d     Diagnoses and all orders for this visit:    1. Supervision of normal first pregnancy, antepartum (Primary)  Overview:  DEBI finalized: 4/18/24 by LMP and confirmed by 6week US    CF neg.  SMA neg.  NIPS 9/27 insufficient plasma, redraw low risk XY.  AFP neg.    COVID: Recommended, declines  Flu: Vaccinated Nov 2024  Tdap:  Vaccinated    Rhogam: received 1/24/25 @28wks  1hr Glucola: 114  FU TPA w glucola: neg  ? Desires Sterilization: declined    Anatomy US: [facility] 12/6/24 Vertex, posterior placenta with placental lake.  Normal growth AC 50%.  Normal anatomy.  Suboptimal cardiac views.  EIF-not clinically significant  FU US: [facility] 12/20/24 breech, posterior placenta, EFW 45%, AC 35%  FU US: [facility] 2/18/25 4#11oz, 84%,  AC 66%,  NIA 15, VTX, gd1 placenta  FU US: [facility] 3/17/25 6#14oz, 88%, AC 82%, NIA 15, VTX, g2 placenta    PROBLEM LIST/PLAN:   Pseudotumor cerebri with significant symptoms   NS Sukhwinder   MFM- Serial LP, and medications such as acetazolamide (Diamox) and corticosteroids may be used in pregnancy as clinically indicated   Hx of Chiari malformation s/p decompression- symptoms w valsalva  SHIRLEY Pretty  S/p MFM- completed  Plan primary CS to avoid hindbrain herniation  S/p Anesthesia consult predelivery- planning spinal  Serial US growth - completed  Del at 39th week  Anxiety/depression- stopped meds w +preg test (Wellbutrin and Buspar).  Stable  Cold sores and serum positive HSV  1 and 2 - Valtrex 1000mg daily- continue  Elev pregravid BMI 37- plan APT by 37wk and growth US.  Qweek NST scheduled qMon    PCOS- early glucola 10/25 wnl  +THC- Stopped after positive preg test    Orders:  -     POC Urinalysis Dipstick      Counseling:    OB precautions, leaking, VB, adalid jerome vs PTL/Labor  FKC    Reassuring pregnancy progress. Continue PNV.      Return in about 1 week (around 3/31/2025) for FU OB.            Indira Ruiz, DO  03/24/2025    [facility]  [address] [place] [address]  Dept: [phone]  Dept Fax: [phone]  Loc: [phone]  Loc Fax: [phone]

## 2025-03-24 ENCOUNTER — ROUTINE PRENATAL (OUTPATIENT)
Dept: OBSTETRICS AND GYNECOLOGY | Facility: CLINIC | Age: 30
End: 2025-03-24
Payer: COMMERCIAL

## 2025-03-24 ENCOUNTER — HOSPITAL ENCOUNTER (OUTPATIENT)
Facility: HOSPITAL | Age: 30
Discharge: HOME | End: 2025-03-24
Attending: OBSTETRICS & GYNECOLOGY | Admitting: OBSTETRICS & GYNECOLOGY
Payer: COMMERCIAL

## 2025-03-24 VITALS — DIASTOLIC BLOOD PRESSURE: 68 MMHG | BODY MASS INDEX: 41.71 KG/M2 | WEIGHT: 243 LBS | SYSTOLIC BLOOD PRESSURE: 118 MMHG

## 2025-03-24 VITALS — HEART RATE: 94 BPM | DIASTOLIC BLOOD PRESSURE: 73 MMHG | SYSTOLIC BLOOD PRESSURE: 123 MMHG | RESPIRATION RATE: 16 BRPM

## 2025-03-24 DIAGNOSIS — Z34.00 SUPERVISION OF NORMAL FIRST PREGNANCY, ANTEPARTUM: Primary | ICD-10-CM

## 2025-03-24 LAB
GLUCOSE UR STRIP-MCNC: NEGATIVE MG/DL
PROT UR STRIP-MCNC: NEGATIVE MG/DL

## 2025-03-24 PROCEDURE — 99214 OFFICE O/P EST MOD 30 MIN: CPT | Performed by: OBSTETRICS & GYNECOLOGY

## 2025-03-24 PROCEDURE — 59025 FETAL NON-STRESS TEST: CPT

## 2025-03-24 PROCEDURE — 59025 FETAL NON-STRESS TEST: CPT | Performed by: OBSTETRICS & GYNECOLOGY

## 2025-03-24 NOTE — NON STRESS TEST
Obstetrical Non-stress Test Interpretation     Name:  Mima Wolfe  MRN: [id number]    29 y.o. female  at 36w3d    Indication: obesity       Fetal Movement: active  Fetal HR Assessment Method: external  Fetal HR (beats/min): 130  Fetal HR Baseline: normal range  Fetal HR Variability: moderate (amplitude range 6 to 25 bpm)  Fetal HR Accelerations: greater than/equal to 15 bpm, lasting at least 15 seconds  Fetal HR Decelerations: absent  Sinusoidal Pattern Present: absent    /73   Pulse 94   LMP 2024 (Exact Date)       Reason for test: Other (Comment) (obesity)  Date of Test: 3/24/2025  Time frame of test: 9688-1023  RN NST Interpretation: Reactive      Vidhi Mckinley RN  3/24/2025  16:14 EDT

## 2025-03-31 ENCOUNTER — ROUTINE PRENATAL (OUTPATIENT)
Dept: OBSTETRICS AND GYNECOLOGY | Age: 30
End: 2025-03-31
Payer: COMMERCIAL

## 2025-03-31 ENCOUNTER — HOSPITAL ENCOUNTER (OUTPATIENT)
Facility: HOSPITAL | Age: 30
Discharge: HOME | End: 2025-03-31
Attending: OBSTETRICS & GYNECOLOGY | Admitting: OBSTETRICS & GYNECOLOGY
Payer: COMMERCIAL

## 2025-03-31 ENCOUNTER — HOSPITAL ENCOUNTER (OUTPATIENT)
Dept: LABOR AND DELIVERY | Facility: HOSPITAL | Age: 30
Discharge: HOME | End: 2025-03-31
Payer: COMMERCIAL

## 2025-03-31 VITALS — BODY MASS INDEX: 41.71 KG/M2 | SYSTOLIC BLOOD PRESSURE: 120 MMHG | WEIGHT: 243 LBS | DIASTOLIC BLOOD PRESSURE: 83 MMHG

## 2025-03-31 VITALS
HEART RATE: 86 BPM | SYSTOLIC BLOOD PRESSURE: 136 MMHG | DIASTOLIC BLOOD PRESSURE: 68 MMHG | TEMPERATURE: 98.2 F | RESPIRATION RATE: 16 BRPM

## 2025-03-31 DIAGNOSIS — O99.210 OBESITY IN PREGNANCY, ANTEPARTUM: ICD-10-CM

## 2025-03-31 DIAGNOSIS — Q07.00 ARNOLD-CHIARI MALFORMATION: ICD-10-CM

## 2025-03-31 DIAGNOSIS — O99.820 GBS (GROUP B STREPTOCOCCUS CARRIER), +RV CULTURE, CURRENTLY PREGNANT: ICD-10-CM

## 2025-03-31 DIAGNOSIS — Z34.00 SUPERVISION OF NORMAL FIRST PREGNANCY, ANTEPARTUM: Primary | ICD-10-CM

## 2025-03-31 DIAGNOSIS — B00.9 HSV INFECTION: ICD-10-CM

## 2025-03-31 PROBLEM — R03.0 ELEVATED BLOOD PRESSURE READING: Status: RESOLVED | Noted: 2025-02-18 | Resolved: 2025-03-31

## 2025-03-31 PROBLEM — R11.2 NAUSEA AND VOMITING: Status: RESOLVED | Noted: 2024-08-23 | Resolved: 2025-03-31

## 2025-03-31 LAB
GLUCOSE UR STRIP-MCNC: NEGATIVE MG/DL
PROT UR STRIP-MCNC: NEGATIVE MG/DL

## 2025-03-31 PROCEDURE — 59025 FETAL NON-STRESS TEST: CPT | Performed by: OBSTETRICS & GYNECOLOGY

## 2025-03-31 PROCEDURE — 59025 FETAL NON-STRESS TEST: CPT

## 2025-03-31 PROCEDURE — 99214 OFFICE O/P EST MOD 30 MIN: CPT | Performed by: OBSTETRICS & GYNECOLOGY

## 2025-03-31 NOTE — PROGRESS NOTES
[facility]  OB Follow Up Visit    CC: Routine obstetrical visit    Prenatal care complicated by:  Patient Active Problem List   Diagnosis    Family history of colon cancer    Family history of ovarian cancer    Difficulty swallowing pills    Elevated blood pressure reading    Pseudotumor cerebri    HSV serum positive I and II    Anxiety and depression    Obesity in pregnancy, antepartum    Supervision of normal first pregnancy, antepartum    Marijuana use during pregnancy    Arnold-Chiari malformation    Gastroesophageal reflux disease without esophagitis    Sciatica of left side    Disorder 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  37w3d patient being seen today for her obstetrical follow up visit. The patient has: No complaints, No leaking fluid, No vaginal bleeding, No contractions, Adequate FM    History: Past medical and surgical history, medications, allergies, social history, and obstetrical history all reviewed and updated.    Objective:    Urine glucose/protein - See OB flow sheet      /83   Wt 110 kg (243 lb)   LMP 2024 (Exact Date)   BMI 41.71 kg/m²     General exam: Comfortable, NAD  FHR: 155 BPM   Uterine Size:  39 cm  Pelvic Exam: No    Assessment and Plan:  Diagnoses and all orders for this visit:    1. Supervision of normal first pregnancy, antepartum (Primary)  Overview:  DEBI finalized: 24 by LMP and confirmed by 6week US    CF neg.  SMA neg.  NIPS  insufficient plasma, redraw low risk XY.  AFP neg.    COVID: Recommended, declines  Flu: Vaccinated 2024  Tdap:  Vaccinated    Rhogam: received 25 @28wks  1hr Glucola: 114  FU TPA w glucola: neg  ? Desires Sterilization: declined    [facility], posterior placenta with placental lake.  Normal growth AC 50%.  Normal anatomy.  Suboptimal cardiac views.   EIF-not clinically significant  FU US: [facility] 24 breech, posterior placenta, EFW 45%, AC 35%  FU US: [facility] 25 4#11oz, 84%,  AC 66%,  NIA 15, VTX, gd1 placenta  FU US: [facility] 3/17/25 6#14oz, 88%, AC 82%, NIA 15, VTX, g2 placenta    Assessment & Plan:  Continue prenatal vitamins  Fetal kick counts  Labor instructions    Orders:  -     POC Urinalysis Dipstick    2. HSV serum positive I and II  Overview:  Cold sores only    Assessment & Plan:  Continue Valtrex suppression      3. Obesity in pregnancy, antepartum  Assessment & Plan:  Continue sees      4. Arnold-Chiari malformation  Overview:  S/p decompression  Surgeon and [facility] rec primary CS due to persistent sxs w valsalva    Assessment & Plan:  Planning Merry  delivery      5. GBS (group B Streptococcus carrier), +RV culture, currently pregnant      37w3d  Reassuring pregnancy progress    Counseling: OB precautions, leaking, VB, adalid jerome vs PTL/Labor  FKC    Questions answered    Return in about 1 week (around 2025) for Recheck.    Demarcus Diaz MD  2025

## 2025-03-31 NOTE — NON STRESS TEST
Obstetrical Non-stress Test Interpretation     Name:  Mima Wolfe  MRN: [id number]    29 y.o. female  at 37w3d    Indication: Obesity      Fetal Assessment  Fetal Movement: active  Fetal HR Assessment Method: external  Fetal HR (beats/min): 130  Fetal HR Baseline: normal range  Fetal HR Variability: moderate (amplitude range 6 to 25 bpm)  Fetal HR Accelerations: periodic, greater than/equal to 15 bpm, lasting at least 15 seconds  Fetal HR Decelerations: absent  Sinusoidal Pattern Present: absent    /68   Pulse 86   Temp 98.2 °F (36.8 °C) (Oral)   Resp 16   LMP 2024 (Exact Date)     Reason for test: Other (Comment) (NST for obesity)  Date of Test: 3/31/2025  Time frame of test: 9713-5485  RN NST Interpretation: Jimmy Lunsford RN  3/31/2025  16:13 EDT

## 2025-04-07 ENCOUNTER — HOSPITAL ENCOUNTER (OUTPATIENT)
Dept: LABOR AND DELIVERY | Facility: HOSPITAL | Age: 30
Discharge: HOME | End: 2025-04-07
Payer: COMMERCIAL

## 2025-04-07 ENCOUNTER — HOSPITAL ENCOUNTER (OUTPATIENT)
Facility: HOSPITAL | Age: 30
Discharge: HOME | End: 2025-04-07
Attending: OBSTETRICS & GYNECOLOGY | Admitting: OBSTETRICS & GYNECOLOGY
Payer: COMMERCIAL

## 2025-04-07 VITALS
DIASTOLIC BLOOD PRESSURE: 62 MMHG | RESPIRATION RATE: 16 BRPM | SYSTOLIC BLOOD PRESSURE: 117 MMHG | HEART RATE: 84 BPM | OXYGEN SATURATION: 98 %

## 2025-04-07 PROCEDURE — 59025 FETAL NON-STRESS TEST: CPT | Performed by: OBSTETRICS & GYNECOLOGY

## 2025-04-07 PROCEDURE — 59025 FETAL NON-STRESS TEST: CPT

## 2025-04-07 NOTE — NON STRESS TEST
[facility]   OB NST Note    2025   Name:  Mima Wolfe  MRN: [id number]    Subjective:  29 y.o.  at 38w3d    Indication: Obesity in pregnancy    NST:   Baseline: 150  Variability:   Moderate/Normal (amplitude 6-25 bpm)  Accelerations: Present (32 weeks+) 15 x 15 bpm  Decelerations: Absent   Contractions:  Not regular    NST interpretation: reactive    Documented Vitals    25 1450 25 1452   BP:  117/62   Pulse: 95 84   Resp:  16   SpO2: 98%          Lab Results (last 24 hours)       ** No results found for the last 24 hours. **           Assessment:  29 y.o.  AT 38w3d  Obesity in pregnancy    Plan:   OB Precautions, FKC, Keep scheduled NSTs      Electronically signed by Demarcus Diaz MD, 25, 5:18 PM EDT.

## 2025-04-07 NOTE — NON STRESS TEST
Obstetrical Non-stress Test Interpretation     Name:  Mima Wolfe  MRN: [id number]    29 y.o. female  at 38w3d    Indication: obesity      Fetal Movement: active  Fetal HR Assessment Method: external  Fetal HR (beats/min): 150  Fetal HR Baseline: normal range  Fetal HR Variability: moderate (amplitude range 6 to 25 bpm)  Fetal HR Accelerations: greater than/equal to 15 bpm, lasting at least 15 seconds  Fetal HR Decelerations: absent  Sinusoidal Pattern Present: absent    /62 (BP Location: Right arm, Patient Position: Sitting)   Pulse 84   Resp 16   LMP 2024 (Exact Date)   SpO2 98%     Reason for test: Other (Comment) (obesity)  Date of Test: 2025  Time frame of test: 0834-2432  RN NST Interpretation: Reactive      Vidhi Mckinley RN  2025  14:55 EDT

## 2025-04-08 NOTE — PROGRESS NOTES
OB FOLLOW UP      Chief Complaint   Patient presents with    Routine Prenatal Visit     Subjective:   No complaints.  Denies VB, leaking fluid, current regular cramping or contractions.    Objective:  /78   Wt 113 kg (249 lb)   LMP 07/12/2024 (Exact Date)   BMI 42.74 kg/m²  15.4 kg (34 lb) Body mass index is 42.74 kg/m².  Chaperone present during pelvic exam if performed.  See OB flow sheet for fundal height (not performed if US day of OV), FHT, edema, cvx exam if performed, and Upro/Uglu.       Assessment and Plan:  38w5d     Diagnoses and all orders for this visit:    1. Supervision of normal first pregnancy, antepartum (Primary)  Overview:  DEBI finalized: 4/18/24 by LMP and confirmed by 6week US    CF neg.  SMA neg.  NIPS 9/27 insufficient plasma, redraw low risk XY.  AFP neg.    COVID: Recommended, declines  Flu: Vaccinated Nov 2024  Tdap:  Vaccinated    Rhogam: received 1/24/25 @28wks  1hr Glucola: 114  FU TPA w glucola: neg  ? Desires Sterilization: declined    Anatomy US: [facility] 12/6/24 Vertex, posterior placenta with placental lake.  Normal growth AC 50%.  Normal anatomy.  Suboptimal cardiac views.  EIF-not clinically significant  FU US: [facility] 12/20/24 breech, posterior placenta, EFW 45%, AC 35%  FU US: [facility] 2/18/25 4#11oz, 84%,  AC 66%,  NIA 15, VTX, gd1 placenta  FU US: [facility] 3/17/25 6#14oz, 88%, AC 82%, NIA 15, VTX, g2 placenta    PROBLEM LIST/PLAN:   Pseudotumor cerebri with significant symptoms   NS Sukhwinder   MFM- Serial LP, and medications such as acetazolamide (Diamox) and corticosteroids may be used in pregnancy as clinically indicated   Hx of Chiari malformation s/p decompression- symptoms w valsalva  SHIRLEY Pretty  S/p MFM- completed  Plan primary CS to avoid hindbrain herniation  S/p Anesthesia consult predelivery- planning spinal  Serial US growth - completed  Del at 39th week  Anxiety/depression- stopped meds w +preg test (Wellbutrin and Buspar).  Stable  Cold sores and serum positive HSV  1 and 2 - Valtrex 1000mg daily- continue  Elev pregravid BMI 37- plan APT by 37wk and growth US.  Qweek NST scheduled qMon    PCOS- early glucola 10/25 wnl  +THC- Stopped after positive preg test    Orders:  -     POC Urinalysis Dipstick      Counseling:    OB precautions, leaking, VB, adalid jerome vs PTL/Labor  FKC  CS reviewed in detail.  All history reviewed and updated.  Preop exam performed.  See separate scanned in counseling note.  All questions answered.  She desires to proceed as planned.     Reassuring pregnancy progress. Continue PNV.      Return in about 4 weeks (around 5/7/2025) for Postpartum FU.            Indira Ruiz, DO  04/09/2025    [facility] [address] [facility]  [address] [place] [address] [address] [place] [address]  Dept: [phone]  Dept Fax: [phone]  Loc: [phone]  Loc Fax: [phone]

## 2025-04-09 ENCOUNTER — ROUTINE PRENATAL (OUTPATIENT)
Dept: OBSTETRICS AND GYNECOLOGY | Age: 30
End: 2025-04-09
Payer: COMMERCIAL

## 2025-04-09 VITALS — BODY MASS INDEX: 42.74 KG/M2 | DIASTOLIC BLOOD PRESSURE: 78 MMHG | WEIGHT: 249 LBS | SYSTOLIC BLOOD PRESSURE: 113 MMHG

## 2025-04-09 DIAGNOSIS — Z34.00 SUPERVISION OF NORMAL FIRST PREGNANCY, ANTEPARTUM: Primary | ICD-10-CM

## 2025-04-09 PROBLEM — K21.9 GASTROESOPHAGEAL REFLUX DISEASE WITHOUT ESOPHAGITIS: Status: RESOLVED | Noted: 2024-12-06 | Resolved: 2025-04-09

## 2025-04-09 PROBLEM — M53.3 DISORDER OF SI (SACROILIAC) JOINT: Status: RESOLVED | Noted: 2025-01-17 | Resolved: 2025-04-09

## 2025-04-09 LAB
GLUCOSE UR STRIP-MCNC: NEGATIVE MG/DL
PROT UR STRIP-MCNC: NEGATIVE MG/DL

## 2025-04-11 ENCOUNTER — PREP FOR SURGERY (OUTPATIENT)
Dept: OTHER | Facility: HOSPITAL | Age: 30
End: 2025-04-11
Payer: COMMERCIAL

## 2025-04-11 RX ORDER — LIDOCAINE HYDROCHLORIDE 10 MG/ML
0.5 INJECTION, SOLUTION EPIDURAL; INFILTRATION; INTRACAUDAL; PERINEURAL ONCE AS NEEDED
Status: CANCELLED | OUTPATIENT
Start: 2025-04-11

## 2025-04-11 RX ORDER — ACETAMINOPHEN 500 MG
1000 TABLET ORAL ONCE
Status: CANCELLED | OUTPATIENT
Start: 2025-04-11 | End: 2025-04-11

## 2025-04-11 RX ORDER — MISOPROSTOL 200 UG/1
800 TABLET ORAL AS NEEDED
Status: CANCELLED | OUTPATIENT
Start: 2025-04-11

## 2025-04-11 RX ORDER — SODIUM CHLORIDE 0.9 % (FLUSH) 0.9 %
10 SYRINGE (ML) INJECTION EVERY 12 HOURS SCHEDULED
Status: CANCELLED | OUTPATIENT
Start: 2025-04-11

## 2025-04-11 RX ORDER — SODIUM CHLORIDE 0.9 % (FLUSH) 0.9 %
10 SYRINGE (ML) INJECTION AS NEEDED
Status: CANCELLED | OUTPATIENT
Start: 2025-04-11

## 2025-04-11 RX ORDER — FAMOTIDINE 20 MG/1
20 TABLET, FILM COATED ORAL ONCE AS NEEDED
Status: CANCELLED | OUTPATIENT
Start: 2025-04-11

## 2025-04-11 RX ORDER — OXYTOCIN/0.9 % SODIUM CHLORIDE 30/500 ML
999 PLASTIC BAG, INJECTION (ML) INTRAVENOUS ONCE
Status: CANCELLED | OUTPATIENT
Start: 2025-04-11 | End: 2025-04-11

## 2025-04-11 RX ORDER — OXYTOCIN/0.9 % SODIUM CHLORIDE 30/500 ML
250 PLASTIC BAG, INJECTION (ML) INTRAVENOUS CONTINUOUS
Status: CANCELLED | OUTPATIENT
Start: 2025-04-11 | End: 2025-04-11

## 2025-04-11 RX ORDER — ONDANSETRON 2 MG/ML
4 INJECTION INTRAMUSCULAR; INTRAVENOUS EVERY 6 HOURS PRN
Status: CANCELLED | OUTPATIENT
Start: 2025-04-11

## 2025-04-11 RX ORDER — KETOROLAC TROMETHAMINE 30 MG/ML
30 INJECTION, SOLUTION INTRAMUSCULAR; INTRAVENOUS ONCE
Status: CANCELLED | OUTPATIENT
Start: 2025-04-11 | End: 2025-04-11

## 2025-04-11 RX ORDER — FAMOTIDINE 10 MG/ML
20 INJECTION, SOLUTION INTRAVENOUS ONCE AS NEEDED
Status: CANCELLED | OUTPATIENT
Start: 2025-04-11

## 2025-04-11 RX ORDER — SODIUM CHLORIDE 9 MG/ML
40 INJECTION, SOLUTION INTRAVENOUS AS NEEDED
Status: CANCELLED | OUTPATIENT
Start: 2025-04-11 | End: 2025-04-13

## 2025-04-11 RX ORDER — ONDANSETRON 4 MG/1
4 TABLET, ORALLY DISINTEGRATING ORAL EVERY 6 HOURS PRN
Status: CANCELLED | OUTPATIENT
Start: 2025-04-11

## 2025-04-11 RX ORDER — SODIUM CHLORIDE, SODIUM LACTATE, POTASSIUM CHLORIDE, CALCIUM CHLORIDE 600; 310; 30; 20 MG/100ML; MG/100ML; MG/100ML; MG/100ML
150 INJECTION, SOLUTION INTRAVENOUS CONTINUOUS
Status: CANCELLED | OUTPATIENT
Start: 2025-04-11 | End: 2025-04-12

## 2025-04-11 RX ORDER — METHYLERGONOVINE MALEATE 0.2 MG/ML
200 INJECTION INTRAVENOUS ONCE AS NEEDED
Status: CANCELLED | OUTPATIENT
Start: 2025-04-11

## 2025-04-11 NOTE — H&P (VIEW-ONLY)
OB HISTORY AND PHYSICAL      SUBJECTIVE:    29 y.o. female  currently at 39w0d [facility] complicated by:      Patient Active Problem List    Diagnosis     Arnold-Chiari malformation [Q07.00]     Supervision of normal first pregnancy, antepartum [Z34.00]     Pseudotumor cerebri [G93.2]     Obesity in pregnancy, antepartum [O99.210]     HSV serum positive I and II [B00.9]     Anxiety and depression [F41.9]     Difficulty swallowing pills [R13.10]     Marijuana use during pregnancy [O99.320, F12.90]     Elevated blood pressure reading [R03.0]     Family history of ovarian cancer [Z80.41]     Family history of colon cancer [Z80.0]     GBS (group B Streptococcus carrier), +RV culture, currently pregnant [O99.820]     Groin lesion [L98.9]     Sciatica of left side [M54.32]        CC/HPI:  Presents with Scheduled CS.  Scheduled primary  due to history of Arnold-Chiari status post decompression and persistent symptoms with Valsalva.  Neurosurgery and maternal-fetal medicine recommend primary  to avoid hindbrain herniation    ROS: No leaking fluid, No vaginal bleeding, No contractions, and Is feeling adequate FM    Past OB History:   OB History    Para Term  AB Living   1 0 0 0 0 0   SAB IAB Ectopic Molar Multiple Live Births   0 0 0 0 0 0      # Outcome Date GA Lbr [place]/2nd Weight Sex Type Anes PTL Lv   1 Current                 Prenatal Labs:    Prenatal Results       Initial Prenatal Labs       Test Value Reference Range Date Time    Hemoglobin  13.5 g/dL 12.0 - 15.9 24 1215    Hematocrit  41.5 % 34.0 - 46.6 24 1215    Platelets  310 10*3/mm3 140 - 450 24 1215    Rubella IgG  4.56 index Immune >0.99 24 1215    Hepatitis B SAg  Non-Reactive  Non-Reactive 24 1215    Hepatitis C Ab  Non-Reactive  Non-Reactive 24 1215    RPR  Non Reactive  Non Reactive 25 1403       Non-Reactive  Non-Reactive 24 1215    T. Pallidum Ab         ABO  A   25  1412    Rh  Negative   01/24/25 1412    Antibody Screen  Negative   09/18/24 1215    HIV  Non-Reactive  Non-Reactive 09/18/24 1215    Urine Culture  25,000 CFU/mL Mixed Stephanie Isolated   09/27/24 1216       >100,000 CFU/mL Mixed Stephanie Isolated   08/30/24 1402    Gonorrhea  Not Detected  Not Detected  09/27/24 1110    Chlamydia  Not Detected  Not Detected  09/27/24 1110    TSH        HgB A1c         Varicella IgG  309 index Immune >165 09/12/24 0729    Hemoglobinopathy Fractionation  Comment   09/18/24 1215    Hemoglobinopathy (genetic testing)        Cystic fibrosis         Spinal muscular atrophy        Fragile X                  Fetal testing        Test Value Reference Range Date Time    NIPT        MSAFP  *Screen Negative*   11/15/24 0937    AFP-4                  2nd and 3rd Trimester       Test Value Reference Range Date Time    Hemoglobin (repeated)  11.6 g/dL 12.0 - 15.9 01/03/25 1403    Hematocrit (repeated)  35.9 % 34.0 - 46.6 01/03/25 1403    Platelets   273 10*3/mm3 140 - 450 01/03/25 1403       310 10*3/mm3 140 - 450 09/18/24 1215    1 hour GTT   114 mg/dL 65 - 139 01/03/25 1403       95 mg/dL 65 - 139 10/25/24 1155    Antibody Screen (repeated)  Negative   01/24/25 1412    3rd TM syphilis scrn (repeated)  RPR   Non Reactive  Non Reactive 01/03/25 1403    3rd TM syphilis scrn (repeated) TP-Ab        3rd TM syphilis screen TB-Ab (FTA)        Syphilis cascade test TP-Ab (EIA)        Syphilis cascade TPPA        GTT Fasting        GTT 1 Hr        GTT 2 Hr        GTT 3 Hr        Group B Strep  Positive  Negative 03/17/25 1606              Other testing        Test Value Reference Range Date Time    Parvo IgG         CMV IgG                   Drug Screening       Test Value Reference Range Date Time    Amphetamine Screen        Barbiturate Screen  Negative  Negative 08/30/24 1402    Benzodiazepine Screen  Negative  Negative 08/30/24 1402    Methadone Screen  Negative  Negative 08/30/24 1402    Phencyclidine  Screen        Opiates Screen  Negative  Negative 08/30/24 1402    THC Screen  Positive  Negative 08/30/24 1402    Cocaine Screen  Negative  Negative 08/30/24 1402    Propoxyphene Screen        Buprenorphine Screen        Methamphetamine Screen        Oxycodone Screen  Negative  Negative 08/30/24 1402    Tricyclic Antidepressants Screen                     PMHx:    Past Medical History:   Diagnosis Date    Anxiety     Chiari malformation type I     Cholelithiasis     Depression     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 Fontanez with [facility] PP    Ingrown toenail     Ovarian cyst     PCOS (polycystic ovarian syndrome) 04/25/2022    By  polycystic ovaries and chronic anovulation         Medications:  folic acid, ondansetron ODT, pantoprazole, and valACYclovir    Allergies:  Allergies   Allergen Reactions    Metoclopramide Shortness Of Breath    Retin-A [Tretinoin] Rash       PSHx:    Past Surgical History:   Procedure Laterality Date    CERVICAL CHIARI DECOMPRESSION POSTERIOR  10/03/2017    CHOLECYSTECTOMY      COLONOSCOPY N/A 11/15/2021    Procedure: COLONOSCOPY with biopsy;  Surgeon: Khari Carlson MD;  Location: [facility] END[facility]COPY;  Service: Gastroenterology;  Laterality: N/A;  normal colon    ENDOSCOPY N/A 11/15/2021    Procedure: ESOPHAGOGASTRODUODENOSCOPY;  Surgeon: Khari Carlson MD;  Location: [facility] ENDOSCOPY;  Service: Gastroenterology;  Laterality: N/A;  normal EGD    EYE SURGERY      x2    WISDOM TOOTH EXTRACTION         Social History:    reports that she is a non-smoker but has been exposed to tobacco smoke. She has never used smokeless tobacco. She reports that she does not currently use  alcohol. She reports that she does not use drugs.    Family History: Non contributory    Immunizations: See prenatal record for Tdap, Flu, Covid and/or other vaccinations    PHYSICAL EXAM:      Chaperone present during breast and/or pelvic/cervical exam if performed.  General- NAD, alert and oriented, appropriate  Psych- normal mood, good memory  Cardiovascular- Regular rhythm, no murnurs  Respiratory- CTA to bases, no wheezes  Abdomen- Gravid, non tender  Fundus-  Non tender.  Size: larger than dates  EFW- NA- scheduled CS   Pelvis-  NA- scheduled CS   Presentation- VTX  Extremities/DTRs- +1 edema, bilaterally equal, no signs of DVT    Fetal HR: Category I  Contractions: Not complaining of any regular contractions      ASSESSMENT:  39w0d  Scheduled CS at 39+1  History of Arnold-Chiari malformation status post decompression with persistent symptoms with Valsalva-MFM and neurosurgery recommend primary   History of pseudotumor cerebri  Anxiety/depression-no medications, stable  Serum positive HSV 1 and 2 without a history of genital herpes.  On Valtrex daily, without prodrome or outbreak    Lab Results   Component Value Date    STREPGPB Positive (A) 2025       PLAN:  Admit  Delivery:       Plan of care [facility] course, R/B/A/potential SE, suspected length have been reviewed with patient and any family or friends present, questions answered to her/his/their satisfaction.  Pt desires to proceed as above.    Counseling:The patient was counseled on the risks, benefits and alternatives of a .  Risks reviewed, but are not limited to: anesthesia, bleeding, transfusion, hysterectomy, infection, damage to organs, reoperation, wound separation, blood clots, and death.  She declines the alternatives and desires a .  All her questions have been answered to her satisfaction and she desires to proceed.          Electronically signed by Indira Ruiz DO, 25, 4:53 PM EDT.      HERMILA VAZQUEZ [facility] ORDERS ONLY  [address] [facility] [address] [place] [address]  Dept: [phone]  Loc: [phone]

## 2025-04-11 NOTE — H&P
OB HISTORY AND PHYSICAL      SUBJECTIVE:    29 y.o. female  currently at 39w0d [facility] complicated by:      Patient Active Problem List    Diagnosis     Arnold-Chiari malformation [Q07.00]     Supervision of normal first pregnancy, antepartum [Z34.00]     Pseudotumor cerebri [G93.2]     Obesity in pregnancy, antepartum [O99.210]     HSV serum positive I and II [B00.9]     Anxiety and depression [F41.9]     Difficulty swallowing pills [R13.10]     Marijuana use during pregnancy [O99.320, F12.90]     Elevated blood pressure reading [R03.0]     Family history of ovarian cancer [Z80.41]     Family history of colon cancer [Z80.0]     GBS (group B Streptococcus carrier), +RV culture, currently pregnant [O99.820]     Groin lesion [L98.9]     Sciatica of left side [M54.32]        CC/HPI:  Presents with Scheduled CS.  Scheduled primary  due to history of Arnold-Chiari status post decompression and persistent symptoms with Valsalva.  Neurosurgery and maternal-fetal medicine recommend primary  to avoid hindbrain herniation    ROS: No leaking fluid, No vaginal bleeding, No contractions, and Is feeling adequate FM    Past OB History:   OB History    Para Term  AB Living   1 0 0 0 0 0   SAB IAB Ectopic Molar Multiple Live Births   0 0 0 0 0 0      # Outcome Date GA Lbr [place]/2nd Weight Sex Type Anes PTL Lv   1 Current                 Prenatal Labs:    Prenatal Results       Initial Prenatal Labs       Test Value Reference Range Date Time    Hemoglobin  13.5 g/dL 12.0 - 15.9 24 1215    Hematocrit  41.5 % 34.0 - 46.6 24 1215    Platelets  310 10*3/mm3 140 - 450 24 1215    Rubella IgG  4.56 index Immune >0.99 24 1215    Hepatitis B SAg  Non-Reactive  Non-Reactive 24 1215    Hepatitis C Ab  Non-Reactive  Non-Reactive 24 1215    RPR  Non Reactive  Non Reactive 25 1403       Non-Reactive  Non-Reactive 24 1215    T. Pallidum Ab         ABO  A   25  1412    Rh  Negative   01/24/25 1412    Antibody Screen  Negative   09/18/24 1215    HIV  Non-Reactive  Non-Reactive 09/18/24 1215    Urine Culture  25,000 CFU/mL Mixed Stephanie Isolated   09/27/24 1216       >100,000 CFU/mL Mixed Stephanie Isolated   08/30/24 1402    Gonorrhea  Not Detected  Not Detected  09/27/24 1110    Chlamydia  Not Detected  Not Detected  09/27/24 1110    TSH        HgB A1c         Varicella IgG  309 index Immune >165 09/12/24 0729    Hemoglobinopathy Fractionation  Comment   09/18/24 1215    Hemoglobinopathy (genetic testing)        Cystic fibrosis         Spinal muscular atrophy        Fragile X                  Fetal testing        Test Value Reference Range Date Time    NIPT        MSAFP  *Screen Negative*   11/15/24 0937    AFP-4                  2nd and 3rd Trimester       Test Value Reference Range Date Time    Hemoglobin (repeated)  11.6 g/dL 12.0 - 15.9 01/03/25 1403    Hematocrit (repeated)  35.9 % 34.0 - 46.6 01/03/25 1403    Platelets   273 10*3/mm3 140 - 450 01/03/25 1403       310 10*3/mm3 140 - 450 09/18/24 1215    1 hour GTT   114 mg/dL 65 - 139 01/03/25 1403       95 mg/dL 65 - 139 10/25/24 1155    Antibody Screen (repeated)  Negative   01/24/25 1412    3rd TM syphilis scrn (repeated)  RPR   Non Reactive  Non Reactive 01/03/25 1403    3rd TM syphilis scrn (repeated) TP-Ab        3rd TM syphilis screen TB-Ab (FTA)        Syphilis cascade test TP-Ab (EIA)        Syphilis cascade TPPA        GTT Fasting        GTT 1 Hr        GTT 2 Hr        GTT 3 Hr        Group B Strep  Positive  Negative 03/17/25 1606              Other testing        Test Value Reference Range Date Time    Parvo IgG         CMV IgG                   Drug Screening       Test Value Reference Range Date Time    Amphetamine Screen        Barbiturate Screen  Negative  Negative 08/30/24 1402    Benzodiazepine Screen  Negative  Negative 08/30/24 1402    Methadone Screen  Negative  Negative 08/30/24 1402    Phencyclidine  Screen        Opiates Screen  Negative  Negative 08/30/24 1402    THC Screen  Positive  Negative 08/30/24 1402    Cocaine Screen  Negative  Negative 08/30/24 1402    Propoxyphene Screen        Buprenorphine Screen        Methamphetamine Screen        Oxycodone Screen  Negative  Negative 08/30/24 1402    Tricyclic Antidepressants Screen                     PMHx:    Past Medical History:   Diagnosis Date    Anxiety     Chiari malformation type I     Cholelithiasis     Depression     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 Fontanez with [facility] PP    Ingrown toenail     Ovarian cyst     PCOS (polycystic ovarian syndrome) 04/25/2022    By  polycystic ovaries and chronic anovulation         Medications:  folic acid, ondansetron ODT, pantoprazole, and valACYclovir    Allergies:  Allergies   Allergen Reactions    Metoclopramide Shortness Of Breath    Retin-A [Tretinoin] Rash       PSHx:    Past Surgical History:   Procedure Laterality Date    CERVICAL CHIARI DECOMPRESSION POSTERIOR  10/03/2017    CHOLECYSTECTOMY      COLONOSCOPY N/A 11/15/2021    Procedure: COLONOSCOPY with biopsy;  Surgeon: Khari Carlson MD;  Location: [facility] ENDOSCOPY;  Service: Gastroenterology;  Laterality: N/A;  normal colon    ENDOSCOPY N/A 11/15/2021    Procedure: ESOPHAGOGASTRODUODENOSCOPY;  Surgeon: Khari Carlson MD;  Location: [facility] ENDOSCOPY;  Service: Gastroenterology;  Laterality: N/A;  normal EGD    EYE SURGERY      x2    WISDOM TOOTH EXTRACTION         Social History:    reports that she is a non-smoker but has been exposed to tobacco smoke. She has never used smokeless tobacco. She reports that she does not currently use  alcohol. She reports that she does not use drugs.    Family History: Non contributory    Immunizations: See prenatal record for Tdap, Flu, Covid and/or other vaccinations    PHYSICAL EXAM:      Chaperone present during breast and/or pelvic/cervical exam if performed.  General- NAD, alert and oriented, appropriate  Psych- normal mood, good memory  Cardiovascular- Regular rhythm, no murnurs  Respiratory- CTA to bases, no wheezes  Abdomen- Gravid, non tender  Fundus-  Non tender.  Size: larger than dates  EFW- NA- scheduled CS   Pelvis-  NA- scheduled CS   Presentation- VTX  Extremities/DTRs- +1 edema, bilaterally equal, no signs of DVT    Fetal HR: Category I  Contractions: Not complaining of any regular contractions      ASSESSMENT:  39w0d  Scheduled CS at 39+1  History of Arnold-Chiari malformation status post decompression with persistent symptoms with Valsalva-MFM and neurosurgery recommend primary   History of pseudotumor cerebri  Anxiety/depression-no medications, stable  Serum positive HSV 1 and 2 without a history of genital herpes.  On Valtrex daily, without prodrome or outbreak    Lab Results   Component Value Date    STREPGPB Positive (A) 2025       PLAN:  Admit  Delivery:       Plan of care [facility] course, R/B/A/potential SE, suspected length have been reviewed with patient and any family or friends present, questions answered to her/his/their satisfaction.  Pt desires to proceed as above.    Counseling:The patient was counseled on the risks, benefits and alternatives of a .  Risks reviewed, but are not limited to: anesthesia, bleeding, transfusion, hysterectomy, infection, damage to organs, reoperation, wound separation, blood clots, and death.  She declines the alternatives and desires a .  All her questions have been answered to her satisfaction and she desires to proceed.          Electronically signed by Indira Ruiz DO, 25, 4:53 PM EDT.      HERMILA VAZQUEZ [facility] ORDERS ONLY  [address] [place] [address]  Dept: [phone]  Loc: [phone]

## 2025-04-12 ENCOUNTER — ANESTHESIA EVENT (OUTPATIENT)
Dept: LABOR AND DELIVERY | Facility: HOSPITAL | Age: 30
End: 2025-04-12
Payer: COMMERCIAL

## 2025-04-12 ENCOUNTER — HOSPITAL ENCOUNTER (INPATIENT)
Facility: HOSPITAL | Age: 30
LOS: 2 days | Discharge: HOME | End: 2025-04-14
Attending: OBSTETRICS & GYNECOLOGY | Admitting: OBSTETRICS & GYNECOLOGY
Payer: COMMERCIAL

## 2025-04-12 ENCOUNTER — ANESTHESIA (OUTPATIENT)
Dept: LABOR AND DELIVERY | Facility: HOSPITAL | Age: 30
End: 2025-04-12
Payer: COMMERCIAL

## 2025-04-12 LAB
ABO GROUP BLD: NORMAL
AMPHET+METHAMPHET UR QL: NEGATIVE
AMPHETAMINES UR QL: NEGATIVE
ATMOSPHERIC PRESS: 746.8 MMHG
ATMOSPHERIC PRESS: 747 MMHG
BARBITURATES UR QL SCN: NEGATIVE
BASE EXCESS BLDCOA CALC-SCNC: -3.6 MMOL/L (ref -2–2)
BASE EXCESS BLDCOV CALC-SCNC: -2.1 MMOL/L (ref -30–30)
BDY SITE: ABNORMAL
BDY SITE: NORMAL
BENZODIAZ UR QL SCN: NEGATIVE
BLD GP AB SCN SERPL QL: NEGATIVE
BUPRENORPHINE SERPL-MCNC: NEGATIVE NG/ML
CANNABINOIDS SERPL QL: NEGATIVE
COCAINE UR QL: NEGATIVE
DEPRECATED RDW RBC AUTO: 41.8 FL (ref 37–54)
ERYTHROCYTE [DISTWIDTH] IN BLOOD BY AUTOMATED COUNT: 14.3 % (ref 12.3–15.4)
FENTANYL UR-MCNC: NEGATIVE NG/ML
HCO3 BLDCOA-SCNC: 25.6 MMOL/L
HCO3 BLDCOV-SCNC: 23.8 MMOL/L
HCT VFR BLD AUTO: 33.2 % (ref 34–46.6)
HGB BLD-MCNC: 11.1 G/DL (ref 12–15.9)
INHALED O2 CONCENTRATION: 21 %
INHALED O2 CONCENTRATION: 21 %
MCH RBC QN AUTO: 27.1 PG (ref 26.6–33)
MCHC RBC AUTO-ENTMCNC: 33.4 G/DL (ref 31.5–35.7)
MCV RBC AUTO: 81.2 FL (ref 79–97)
METHADONE UR QL SCN: NEGATIVE
MODALITY: ABNORMAL
MODALITY: NORMAL
OPIATES UR QL: NEGATIVE
OXYCODONE UR QL SCN: NEGATIVE
PCO2 BLDCOA: 63.9 MMHG (ref 33–49)
PCO2 BLDCOV: 44.2 MM HG (ref 35–51.3)
PCP UR QL SCN: NEGATIVE
PH BLDCOA: 7.21 PH UNITS (ref 7.18–7.34)
PH BLDCOV: 7.34 PH UNITS (ref 7.26–7.4)
PLATELET # BLD AUTO: 273 10*3/MM3 (ref 140–450)
PMV BLD AUTO: 11 FL (ref 6–12)
PO2 BLDCOA: 18.6 MMHG
PO2 BLDCOV: 28.7 MM HG (ref 19–39)
RBC # BLD AUTO: 4.09 10*6/MM3 (ref 3.77–5.28)
RH BLD: NEGATIVE
SAO2 % BLDCOA: 19.5 %
SAO2 % BLDCOV: 50.3 %
T&S EXPIRATION DATE: NORMAL
TREPONEMA PALLIDUM IGG+IGM AB [PRESENCE] IN SERUM OR PLASMA BY IMMUNOASSAY: NORMAL
TRICYCLICS UR QL SCN: NEGATIVE
WBC NRBC COR # BLD AUTO: 12.76 10*3/MM3 (ref 3.4–10.8)

## 2025-04-12 PROCEDURE — 25010000002 PHENYLEPHRINE 10 MG/ML SOLUTION: Performed by: NURSE ANESTHETIST, CERTIFIED REGISTERED

## 2025-04-12 PROCEDURE — 25010000002 OXYTOCIN PER 10 UNITS: Performed by: NURSE ANESTHETIST, CERTIFIED REGISTERED

## 2025-04-12 PROCEDURE — 25810000003 LACTATED RINGERS PER 1000 ML: Performed by: OBSTETRICS & GYNECOLOGY

## 2025-04-12 PROCEDURE — 82803 BLOOD GASES ANY COMBINATION: CPT | Performed by: OBSTETRICS & GYNECOLOGY

## 2025-04-12 PROCEDURE — 25010000002 ONDANSETRON PER 1 MG: Performed by: OBSTETRICS & GYNECOLOGY

## 2025-04-12 PROCEDURE — 25010000002 MORPHINE PER 10 MG: Performed by: NURSE ANESTHETIST, CERTIFIED REGISTERED

## 2025-04-12 PROCEDURE — 25010000002 BUPIVACAINE PF 0.75 % SOLUTION: Performed by: NURSE ANESTHETIST, CERTIFIED REGISTERED

## 2025-04-12 PROCEDURE — 80307 DRUG TEST PRSMV CHEM ANLYZR: CPT | Performed by: OBSTETRICS & GYNECOLOGY

## 2025-04-12 PROCEDURE — 85027 COMPLETE CBC AUTOMATED: CPT | Performed by: OBSTETRICS & GYNECOLOGY

## 2025-04-12 PROCEDURE — 25810000003 LACTATED RINGERS SOLUTION: Performed by: OBSTETRICS & GYNECOLOGY

## 2025-04-12 PROCEDURE — 25010000002 FAMOTIDINE 10 MG/ML SOLUTION: Performed by: OBSTETRICS & GYNECOLOGY

## 2025-04-12 PROCEDURE — 86850 RBC ANTIBODY SCREEN: CPT | Performed by: OBSTETRICS & GYNECOLOGY

## 2025-04-12 PROCEDURE — C1765 ADHESION BARRIER: HCPCS | Performed by: OBSTETRICS & GYNECOLOGY

## 2025-04-12 PROCEDURE — 86900 BLOOD TYPING SEROLOGIC ABO: CPT | Performed by: OBSTETRICS & GYNECOLOGY

## 2025-04-12 PROCEDURE — 25010000002 KETOROLAC TROMETHAMINE PER 15 MG: Performed by: OBSTETRICS & GYNECOLOGY

## 2025-04-12 PROCEDURE — 25010000002 ONDANSETRON PER 1 MG: Performed by: NURSE ANESTHETIST, CERTIFIED REGISTERED

## 2025-04-12 PROCEDURE — 25010000002 CEFAZOLIN PER 500 MG: Performed by: OBSTETRICS & GYNECOLOGY

## 2025-04-12 PROCEDURE — 86780 TREPONEMA PALLIDUM: CPT | Performed by: OBSTETRICS & GYNECOLOGY

## 2025-04-12 PROCEDURE — 86901 BLOOD TYPING SEROLOGIC RH(D): CPT | Performed by: OBSTETRICS & GYNECOLOGY

## 2025-04-12 PROCEDURE — 25010000002 MIDAZOLAM PER 1MG: Performed by: NURSE ANESTHETIST, CERTIFIED REGISTERED

## 2025-04-12 DEVICE — ABSORBABLE ADHESION BARRIER
Type: IMPLANTABLE DEVICE | Site: UTERUS | Status: FUNCTIONAL
Brand: GYNECARE INTERCEED

## 2025-04-12 RX ORDER — SODIUM CHLORIDE, SODIUM LACTATE, POTASSIUM CHLORIDE, CALCIUM CHLORIDE 600; 310; 30; 20 MG/100ML; MG/100ML; MG/100ML; MG/100ML
150 INJECTION, SOLUTION INTRAVENOUS CONTINUOUS
Status: DISCONTINUED | OUTPATIENT
Start: 2025-04-12 | End: 2025-04-12

## 2025-04-12 RX ORDER — SODIUM CHLORIDE, SODIUM LACTATE, POTASSIUM CHLORIDE, CALCIUM CHLORIDE 600; 310; 30; 20 MG/100ML; MG/100ML; MG/100ML; MG/100ML
125 INJECTION, SOLUTION INTRAVENOUS CONTINUOUS
Status: ACTIVE | OUTPATIENT
Start: 2025-04-12 | End: 2025-04-13

## 2025-04-12 RX ORDER — EPHEDRINE SULFATE 50 MG/ML
INJECTION INTRAVENOUS AS NEEDED
Status: DISCONTINUED | OUTPATIENT
Start: 2025-04-12 | End: 2025-04-12 | Stop reason: SURG

## 2025-04-12 RX ORDER — LIDOCAINE HYDROCHLORIDE 10 MG/ML
0.5 INJECTION, SOLUTION EPIDURAL; INFILTRATION; INTRACAUDAL; PERINEURAL ONCE AS NEEDED
Status: DISCONTINUED | OUTPATIENT
Start: 2025-04-12 | End: 2025-04-12 | Stop reason: HOSPADM

## 2025-04-12 RX ORDER — BUPIVACAINE HYDROCHLORIDE 7.5 MG/ML
INJECTION, SOLUTION EPIDURAL; RETROBULBAR
Status: COMPLETED | OUTPATIENT
Start: 2025-04-12 | End: 2025-04-12

## 2025-04-12 RX ORDER — SODIUM CHLORIDE 0.9 % (FLUSH) 0.9 %
10 SYRINGE (ML) INJECTION AS NEEDED
Status: DISCONTINUED | OUTPATIENT
Start: 2025-04-12 | End: 2025-04-12 | Stop reason: HOSPADM

## 2025-04-12 RX ORDER — MISOPROSTOL 200 UG/1
800 TABLET ORAL AS NEEDED
Status: DISCONTINUED | OUTPATIENT
Start: 2025-04-12 | End: 2025-04-12 | Stop reason: HOSPADM

## 2025-04-12 RX ORDER — SIMETHICONE 80 MG
80 TABLET,CHEWABLE ORAL 4 TIMES DAILY PRN
Status: DISCONTINUED | OUTPATIENT
Start: 2025-04-12 | End: 2025-04-14 | Stop reason: HOSPADM

## 2025-04-12 RX ORDER — IBUPROFEN 600 MG/1
600 TABLET, FILM COATED ORAL EVERY 6 HOURS PRN
Qty: 30 TABLET | Refills: 0 | Status: SHIPPED | OUTPATIENT
Start: 2025-04-12

## 2025-04-12 RX ORDER — ONDANSETRON 2 MG/ML
4 INJECTION INTRAMUSCULAR; INTRAVENOUS EVERY 6 HOURS PRN
Status: DISCONTINUED | OUTPATIENT
Start: 2025-04-12 | End: 2025-04-14 | Stop reason: HOSPADM

## 2025-04-12 RX ORDER — SODIUM CHLORIDE 9 MG/ML
40 INJECTION, SOLUTION INTRAVENOUS AS NEEDED
Status: DISCONTINUED | OUTPATIENT
Start: 2025-04-12 | End: 2025-04-12 | Stop reason: HOSPADM

## 2025-04-12 RX ORDER — HYDROMORPHONE HYDROCHLORIDE 2 MG/ML
0.25 INJECTION, SOLUTION INTRAMUSCULAR; INTRAVENOUS; SUBCUTANEOUS
Status: DISCONTINUED | OUTPATIENT
Start: 2025-04-12 | End: 2025-04-12

## 2025-04-12 RX ORDER — NALOXONE HCL 0.4 MG/ML
0.4 VIAL (ML) INJECTION ONCE AS NEEDED
Status: ACTIVE | OUTPATIENT
Start: 2025-04-12 | End: 2025-04-13

## 2025-04-12 RX ORDER — OXYTOCIN/0.9 % SODIUM CHLORIDE 30/500 ML
250 PLASTIC BAG, INJECTION (ML) INTRAVENOUS CONTINUOUS
Status: ACTIVE | OUTPATIENT
Start: 2025-04-12 | End: 2025-04-12

## 2025-04-12 RX ORDER — ACETAMINOPHEN 500 MG
1000 TABLET ORAL ONCE
Status: COMPLETED | OUTPATIENT
Start: 2025-04-12 | End: 2025-04-12

## 2025-04-12 RX ORDER — KETOROLAC TROMETHAMINE 15 MG/ML
15 INJECTION, SOLUTION INTRAMUSCULAR; INTRAVENOUS EVERY 6 HOURS
Status: DISPENSED | OUTPATIENT
Start: 2025-04-12 | End: 2025-04-13

## 2025-04-12 RX ORDER — CALCIUM CARBONATE 500 MG/1
1 TABLET, CHEWABLE ORAL EVERY 4 HOURS PRN
Status: DISCONTINUED | OUTPATIENT
Start: 2025-04-12 | End: 2025-04-14 | Stop reason: HOSPADM

## 2025-04-12 RX ORDER — ONDANSETRON 2 MG/ML
4 INJECTION INTRAMUSCULAR; INTRAVENOUS EVERY 6 HOURS PRN
Status: DISCONTINUED | OUTPATIENT
Start: 2025-04-12 | End: 2025-04-12 | Stop reason: HOSPADM

## 2025-04-12 RX ORDER — FAMOTIDINE 20 MG/1
20 TABLET, FILM COATED ORAL ONCE AS NEEDED
Status: DISCONTINUED | OUTPATIENT
Start: 2025-04-12 | End: 2025-04-12 | Stop reason: HOSPADM

## 2025-04-12 RX ORDER — DOCUSATE SODIUM 100 MG/1
100 CAPSULE, LIQUID FILLED ORAL DAILY
Status: DISCONTINUED | OUTPATIENT
Start: 2025-04-12 | End: 2025-04-14 | Stop reason: HOSPADM

## 2025-04-12 RX ORDER — HYDROCORTISONE 25 MG/G
CREAM TOPICAL 3 TIMES DAILY PRN
Status: DISCONTINUED | OUTPATIENT
Start: 2025-04-12 | End: 2025-04-14 | Stop reason: HOSPADM

## 2025-04-12 RX ORDER — OXYCODONE HYDROCHLORIDE 5 MG/1
10 TABLET ORAL EVERY 6 HOURS PRN
Status: DISCONTINUED | OUTPATIENT
Start: 2025-04-12 | End: 2025-04-14 | Stop reason: HOSPADM

## 2025-04-12 RX ORDER — MIDAZOLAM HYDROCHLORIDE 2 MG/2ML
INJECTION, SOLUTION INTRAMUSCULAR; INTRAVENOUS AS NEEDED
Status: DISCONTINUED | OUTPATIENT
Start: 2025-04-12 | End: 2025-04-12 | Stop reason: SURG

## 2025-04-12 RX ORDER — ACETAMINOPHEN 325 MG/1
650 TABLET ORAL EVERY 6 HOURS
Status: DISCONTINUED | OUTPATIENT
Start: 2025-04-13 | End: 2025-04-14 | Stop reason: HOSPADM

## 2025-04-12 RX ORDER — DIPHENHYDRAMINE HYDROCHLORIDE 50 MG/ML
12.5 INJECTION, SOLUTION INTRAMUSCULAR; INTRAVENOUS EVERY 6 HOURS PRN
Status: ACTIVE | OUTPATIENT
Start: 2025-04-12 | End: 2025-04-13

## 2025-04-12 RX ORDER — OXYTOCIN/0.9 % SODIUM CHLORIDE 30/500 ML
999 PLASTIC BAG, INJECTION (ML) INTRAVENOUS ONCE
Status: DISCONTINUED | OUTPATIENT
Start: 2025-04-12 | End: 2025-04-12 | Stop reason: HOSPADM

## 2025-04-12 RX ORDER — ACETAMINOPHEN 500 MG
1000 TABLET ORAL EVERY 6 HOURS
Status: COMPLETED | OUTPATIENT
Start: 2025-04-12 | End: 2025-04-13

## 2025-04-12 RX ORDER — ALUMINA, MAGNESIA, AND SIMETHICONE 2400; 2400; 240 MG/30ML; MG/30ML; MG/30ML
15 SUSPENSION ORAL EVERY 4 HOURS PRN
Status: DISCONTINUED | OUTPATIENT
Start: 2025-04-12 | End: 2025-04-14 | Stop reason: HOSPADM

## 2025-04-12 RX ORDER — OXYCODONE HYDROCHLORIDE 5 MG/1
5 TABLET ORAL
Status: DISCONTINUED | OUTPATIENT
Start: 2025-04-12 | End: 2025-04-12

## 2025-04-12 RX ORDER — OXYCODONE HYDROCHLORIDE 5 MG/1
5 TABLET ORAL EVERY 6 HOURS PRN
Status: DISCONTINUED | OUTPATIENT
Start: 2025-04-12 | End: 2025-04-14 | Stop reason: HOSPADM

## 2025-04-12 RX ORDER — KETOROLAC TROMETHAMINE 30 MG/ML
30 INJECTION, SOLUTION INTRAMUSCULAR; INTRAVENOUS ONCE
Status: COMPLETED | OUTPATIENT
Start: 2025-04-12 | End: 2025-04-12

## 2025-04-12 RX ORDER — FAMOTIDINE 10 MG/ML
20 INJECTION, SOLUTION INTRAVENOUS ONCE AS NEEDED
Status: COMPLETED | OUTPATIENT
Start: 2025-04-12 | End: 2025-04-12

## 2025-04-12 RX ORDER — SODIUM CHLORIDE 0.9 % (FLUSH) 0.9 %
10 SYRINGE (ML) INJECTION EVERY 12 HOURS SCHEDULED
Status: DISCONTINUED | OUTPATIENT
Start: 2025-04-12 | End: 2025-04-14 | Stop reason: HOSPADM

## 2025-04-12 RX ORDER — IBUPROFEN 600 MG/1
600 TABLET, FILM COATED ORAL EVERY 6 HOURS
Status: DISCONTINUED | OUTPATIENT
Start: 2025-04-13 | End: 2025-04-14 | Stop reason: HOSPADM

## 2025-04-12 RX ORDER — HYDROXYZINE HYDROCHLORIDE 25 MG/1
50 TABLET, FILM COATED ORAL EVERY 6 HOURS PRN
Status: DISCONTINUED | OUTPATIENT
Start: 2025-04-12 | End: 2025-04-14 | Stop reason: HOSPADM

## 2025-04-12 RX ORDER — MORPHINE SULFATE 0.5 MG/ML
INJECTION, SOLUTION EPIDURAL; INTRATHECAL; INTRAVENOUS
Status: COMPLETED | OUTPATIENT
Start: 2025-04-12 | End: 2025-04-12

## 2025-04-12 RX ORDER — ONDANSETRON 2 MG/ML
INJECTION INTRAMUSCULAR; INTRAVENOUS AS NEEDED
Status: DISCONTINUED | OUTPATIENT
Start: 2025-04-12 | End: 2025-04-12 | Stop reason: SURG

## 2025-04-12 RX ORDER — SODIUM CHLORIDE 0.9 % (FLUSH) 0.9 %
10 SYRINGE (ML) INJECTION AS NEEDED
Status: DISCONTINUED | OUTPATIENT
Start: 2025-04-12 | End: 2025-04-14 | Stop reason: HOSPADM

## 2025-04-12 RX ORDER — DIPHENHYDRAMINE HCL 25 MG
25 CAPSULE ORAL EVERY 6 HOURS PRN
Status: ACTIVE | OUTPATIENT
Start: 2025-04-12 | End: 2025-04-13

## 2025-04-12 RX ORDER — AMOXICILLIN 250 MG
1 CAPSULE ORAL 2 TIMES DAILY PRN
Status: DISCONTINUED | OUTPATIENT
Start: 2025-04-12 | End: 2025-04-14 | Stop reason: HOSPADM

## 2025-04-12 RX ORDER — FAMOTIDINE 10 MG/ML
20 INJECTION, SOLUTION INTRAVENOUS ONCE AS NEEDED
Status: DISCONTINUED | OUTPATIENT
Start: 2025-04-12 | End: 2025-04-12 | Stop reason: HOSPADM

## 2025-04-12 RX ORDER — ACETAMINOPHEN 325 MG/1
650 TABLET ORAL EVERY 6 HOURS PRN
Qty: 30 TABLET | Refills: 1 | Status: SHIPPED | OUTPATIENT
Start: 2025-04-12

## 2025-04-12 RX ORDER — ONDANSETRON 4 MG/1
4 TABLET, ORALLY DISINTEGRATING ORAL EVERY 6 HOURS PRN
Status: DISCONTINUED | OUTPATIENT
Start: 2025-04-12 | End: 2025-04-14 | Stop reason: HOSPADM

## 2025-04-12 RX ORDER — SODIUM CHLORIDE 9 MG/ML
40 INJECTION, SOLUTION INTRAVENOUS AS NEEDED
Status: DISCONTINUED | OUTPATIENT
Start: 2025-04-12 | End: 2025-04-14 | Stop reason: HOSPADM

## 2025-04-12 RX ORDER — PHENYLEPHRINE HYDROCHLORIDE 10 MG/ML
INJECTION INTRAVENOUS AS NEEDED
Status: DISCONTINUED | OUTPATIENT
Start: 2025-04-12 | End: 2025-04-12 | Stop reason: SURG

## 2025-04-12 RX ORDER — HYDROMORPHONE HYDROCHLORIDE 2 MG/ML
0.5 INJECTION, SOLUTION INTRAMUSCULAR; INTRAVENOUS; SUBCUTANEOUS
Status: DISCONTINUED | OUTPATIENT
Start: 2025-04-12 | End: 2025-04-12

## 2025-04-12 RX ORDER — NALOXONE HCL 0.4 MG/ML
0.4 VIAL (ML) INJECTION
Status: DISCONTINUED | OUTPATIENT
Start: 2025-04-12 | End: 2025-04-14 | Stop reason: HOSPADM

## 2025-04-12 RX ORDER — SODIUM CHLORIDE 0.9 % (FLUSH) 0.9 %
10 SYRINGE (ML) INJECTION EVERY 12 HOURS SCHEDULED
Status: DISCONTINUED | OUTPATIENT
Start: 2025-04-12 | End: 2025-04-12 | Stop reason: HOSPADM

## 2025-04-12 RX ORDER — ENOXAPARIN SODIUM 100 MG/ML
40 INJECTION SUBCUTANEOUS EVERY 12 HOURS
Status: DISCONTINUED | OUTPATIENT
Start: 2025-04-13 | End: 2025-04-14 | Stop reason: HOSPADM

## 2025-04-12 RX ORDER — PROMETHAZINE HYDROCHLORIDE 25 MG/1
12.5 TABLET ORAL EVERY 4 HOURS PRN
Status: DISCONTINUED | OUTPATIENT
Start: 2025-04-12 | End: 2025-04-14 | Stop reason: HOSPADM

## 2025-04-12 RX ORDER — OXYTOCIN 10 [USP'U]/ML
INJECTION, SOLUTION INTRAMUSCULAR; INTRAVENOUS AS NEEDED
Status: DISCONTINUED | OUTPATIENT
Start: 2025-04-12 | End: 2025-04-12 | Stop reason: SURG

## 2025-04-12 RX ORDER — METHYLERGONOVINE MALEATE 0.2 MG/ML
200 INJECTION INTRAVENOUS ONCE AS NEEDED
Status: DISCONTINUED | OUTPATIENT
Start: 2025-04-12 | End: 2025-04-12 | Stop reason: HOSPADM

## 2025-04-12 RX ORDER — ONDANSETRON 4 MG/1
4 TABLET, ORALLY DISINTEGRATING ORAL EVERY 6 HOURS PRN
Status: DISCONTINUED | OUTPATIENT
Start: 2025-04-12 | End: 2025-04-12 | Stop reason: HOSPADM

## 2025-04-12 RX ORDER — VALACYCLOVIR HYDROCHLORIDE 500 MG/1
1000 TABLET, FILM COATED ORAL DAILY
Status: DISCONTINUED | OUTPATIENT
Start: 2025-04-12 | End: 2025-04-14 | Stop reason: HOSPADM

## 2025-04-12 RX ADMIN — SODIUM CHLORIDE 2 G: 9 INJECTION, SOLUTION INTRAVENOUS at 11:10

## 2025-04-12 RX ADMIN — SODIUM CHLORIDE, POTASSIUM CHLORIDE, SODIUM LACTATE AND CALCIUM CHLORIDE: 600; 310; 30; 20 INJECTION, SOLUTION INTRAVENOUS at 11:46

## 2025-04-12 RX ADMIN — ONDANSETRON 8 MG: 2 INJECTION INTRAMUSCULAR; INTRAVENOUS at 11:27

## 2025-04-12 RX ADMIN — OXYTOCIN 40 UNITS: 10 INJECTION, SOLUTION INTRAMUSCULAR; INTRAVENOUS at 11:47

## 2025-04-12 RX ADMIN — ACETAMINOPHEN 1000 MG: 500 TABLET, FILM COATED ORAL at 21:55

## 2025-04-12 RX ADMIN — PHENYLEPHRINE HYDROCHLORIDE 200 MCG: 10 INJECTION INTRAVENOUS at 12:03

## 2025-04-12 RX ADMIN — EPHEDRINE SULFATE 25 MG: 50 INJECTION INTRAVENOUS at 11:25

## 2025-04-12 RX ADMIN — VALACYCLOVIR 1000 MG: 500 TABLET ORAL at 16:09

## 2025-04-12 RX ADMIN — SODIUM CHLORIDE, POTASSIUM CHLORIDE, SODIUM LACTATE AND CALCIUM CHLORIDE 150 ML/HR: 600; 310; 30; 20 INJECTION, SOLUTION INTRAVENOUS at 07:14

## 2025-04-12 RX ADMIN — SODIUM CHLORIDE, POTASSIUM CHLORIDE, SODIUM LACTATE AND CALCIUM CHLORIDE 1000 ML: 600; 310; 30; 20 INJECTION, SOLUTION INTRAVENOUS at 06:33

## 2025-04-12 RX ADMIN — ACETAMINOPHEN 1000 MG: 500 TABLET, FILM COATED ORAL at 16:09

## 2025-04-12 RX ADMIN — KETOROLAC TROMETHAMINE 30 MG: 30 INJECTION, SOLUTION INTRAMUSCULAR; INTRAVENOUS at 12:40

## 2025-04-12 RX ADMIN — PHENYLEPHRINE HYDROCHLORIDE 100 MCG: 10 INJECTION INTRAVENOUS at 11:33

## 2025-04-12 RX ADMIN — ONDANSETRON 4 MG: 2 INJECTION INTRAMUSCULAR; INTRAVENOUS at 19:33

## 2025-04-12 RX ADMIN — SODIUM CHLORIDE, POTASSIUM CHLORIDE, SODIUM LACTATE AND CALCIUM CHLORIDE 150 ML/HR: 600; 310; 30; 20 INJECTION, SOLUTION INTRAVENOUS at 09:47

## 2025-04-12 RX ADMIN — FAMOTIDINE 20 MG: 10 INJECTION INTRAVENOUS at 10:47

## 2025-04-12 RX ADMIN — ACETAMINOPHEN 1000 MG: 500 TABLET ORAL at 09:45

## 2025-04-12 RX ADMIN — BUPIVACAINE HYDROCHLORIDE 1.4 ML: 7.5 INJECTION, SOLUTION EPIDURAL; RETROBULBAR at 11:22

## 2025-04-12 RX ADMIN — MIDAZOLAM HYDROCHLORIDE 2 MG: 1 INJECTION, SOLUTION INTRAMUSCULAR; INTRAVENOUS at 12:06

## 2025-04-12 RX ADMIN — MORPHINE SULFATE 0.15 MG: 0.5 INJECTION, SOLUTION EPIDURAL; INTRATHECAL; INTRAVENOUS at 11:22

## 2025-04-12 NOTE — DISCHARGE SUMMARY
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 Indira Ruiz,   Anesthesia: Spinal  Delivery Type:  LTCS  Feeding method: Bottle    Infant: male infant; Andrew    Weight: 3940 g (8 lb 11 oz)     APGARS: 9  @ 1 minute / 9  @ 5 minutes    Hospital Course/Significant Findings:  Patient arrived for scheduled .  Spinal and surgery uncomplicated.  Uncomplicated PP.  On the day of discharge POSTOP CSECTION tolerating a regular diet, ambulating, pain well controlled, urinating spontaneously and lochia appropriate.   Vital signs were stable and afebrile.  Exam was within normal limits.  Incision was intact, well approximated without signs of infection ([employer] Wound Management System in place if present).  Abdomen was soft nondistended non-tender.  Fundus was below umbilicus and non-tender.  Meeting discharge criteria and desired discharge home.  Postop instructions and FU reviewed and questions answered.    Results from last 7 days   Lab Units 25  0557 25  0627   WBC 10*3/mm3 13.06* 12.76*   HEMOGLOBIN g/dL 9.3* 11.1*   HEMATOCRIT % 28.5* 33.2*   PLATELETS 10*3/mm3 238 273             Results from last 7 days   Lab Units 25  0627   TREPONEMA PALLIDUM AB TOTAL  Non-Reactive       Results for orders placed or performed during  the hospital encounter of 04/12/25   CBC (No Diff)    Collection Time: 04/12/25  6:27 AM    Specimen: Blood   Result Value Ref Range    WBC 12.76 (H) 3.40 - 10.80 10*3/mm3    RBC 4.09 3.77 - 5.28 10*6/mm3    Hemoglobin 11.1 (L) 12.0 - 15.9 g/dL    Hematocrit 33.2 (L) 34.0 - 46.6 %    MCV 81.2 79.0 - 97.0 fL    MCH 27.1 26.6 - 33.0 pg    MCHC 33.4 31.5 - 35.7 g/dL    RDW 14.3 12.3 - 15.4 %    RDW-SD 41.8 37.0 - 54.0 fl    MPV 11.0 6.0 - 12.0 fL    Platelets 273 140 - 450 10*3/mm3   Treponema pallidum AB w/Reflex RPR    Collection Time: 04/12/25  6:27 AM    Specimen: Blood   Result Value Ref Range    Treponemal AB Total Non-Reactive Non-Reactive   Urine Drug Screen - Urine, Clean Catch    Collection Time: 04/12/25  6:27 AM    Specimen: Urine, Clean Catch   Result Value Ref Range    THC, Screen, Urine Negative Negative    Phencyclidine (PCP), Urine Negative Negative    Cocaine Screen, Urine Negative Negative    Methamphetamine, Ur Negative Negative    Opiate Screen Negative Negative    Amphetamine Screen, Urine Negative Negative    Benzodiazepine Screen, Urine Negative Negative    Tricyclic Antidepressants Screen Negative Negative    Methadone Screen, Urine Negative Negative    Barbiturates Screen, Urine Negative Negative    Oxycodone Screen, Urine Negative Negative    Buprenorphine, Screen, Urine Negative Negative   Fentanyl, Urine - Urine, Clean Catch    Collection Time: 04/12/25  6:27 AM    Specimen: Urine, Clean Catch   Result Value Ref Range    Fentanyl, Urine Negative Negative   Type & Screen    Collection Time: 04/12/25  6:27 AM    Specimen: Blood   Result Value Ref Range    ABO Type A     RH type Negative     Antibody Screen Negative     T&S Expiration Date 4/15/2025 11:59:59 PM    Blood Gas, Arterial, Cord    Collection Time: 04/12/25 12:09 PM    Specimen: Umbilical Cord; Cord Blood Arterial   Result Value Ref Range    Site Arterial Line     pH, Cord Arterial 7.21 7.18 - 7.34 pH Units    pCO2, Cord Arterial  63.9 (H) 33.0 - 49.0 mmHg    pO2, Cord Arterial 18.6 mmHg    HCO3, Cord Arterial 25.6 mmol/L    Base Exc, Cord Arterial -3.6 (L) -2.0 - 2.0 mmol/L    O2 Sat, Cord Arterial 19.5 %    Barometric Pressure for Blood Gas 747.0000 mmHg    Modality Room Air     FIO2 21 %   Blood Gas, Venous, Cord    Collection Time: 04/12/25 12:09 PM    Specimen: Umbilical Cord; Cord Blood Venous   Result Value Ref Range    Site Arterial Line     pH, Cord Venous 7.339 7.260 - 7.400 pH Units    pCO2, Cord Venous 44.2 35.0 - 51.3 mm Hg    pO2, Cord Venous 28.7 19.0 - 39.0 mm Hg    HCO3, Cord Venous 23.8 mmol/L    Base Excess, Cord Venous -2.1 -30.0 - 30.0 mmol/L    O2 Sat, Cord Venous 50.3 %    Barometric Pressure for Blood Gas 746.8000 mmHg    Modality Room Air     FIO2 21 %   CBC Auto Differential    Collection Time: 04/13/25  5:57 AM    Specimen: Arm, Right; Blood   Result Value Ref Range    WBC 13.06 (H) 3.40 - 10.80 10*3/mm3    RBC 3.48 (L) 3.77 - 5.28 10*6/mm3    Hemoglobin 9.3 (L) 12.0 - 15.9 g/dL    Hematocrit 28.5 (L) 34.0 - 46.6 %    MCV 81.9 79.0 - 97.0 fL    MCH 26.7 26.6 - 33.0 pg    MCHC 32.6 31.5 - 35.7 g/dL    RDW 14.5 12.3 - 15.4 %    RDW-SD 42.4 37.0 - 54.0 fl    MPV 11.0 6.0 - 12.0 fL    Platelets 238 140 - 450 10*3/mm3    Neutrophil % 78.6 (H) 42.7 - 76.0 %    Lymphocyte % 10.2 (L) 19.6 - 45.3 %    Monocyte % 9.7 5.0 - 12.0 %    Eosinophil % 0.8 0.3 - 6.2 %    Basophil % 0.2 0.0 - 1.5 %    Immature Grans % 0.5 0.0 - 0.5 %    Neutrophils, Absolute 10.26 (H) 1.70 - 7.00 10*3/mm3    Lymphocytes, Absolute 1.33 0.70 - 3.10 10*3/mm3    Monocytes, Absolute 1.27 (H) 0.10 - 0.90 10*3/mm3    Eosinophils, Absolute 0.11 0.00 - 0.40 10*3/mm3    Basophils, Absolute 0.02 0.00 - 0.20 10*3/mm3    Immature Grans, Absolute 0.07 (H) 0.00 - 0.05 10*3/mm3    nRBC 0.0 0.0 - 0.2 /100 WBC   Green Top (Gel)    Collection Time: 04/13/25  5:57 AM   Result Value Ref Range    Extra Tube Hold for add-ons.          Discharge:         Discharge  Medications        New Medications        Instructions Start Date   acetaminophen 325 MG tablet  Commonly known as: Tylenol   650 mg, Oral, Every 6 Hours PRN      docusate sodium 100 MG capsule  Commonly known as: Colace   100 mg, Oral, 2 Times Daily PRN      Hydrocortisone Ace-Pramoxine 1-1 % rectal cream   Rectal, 3 Times Daily PRN      ibuprofen 600 MG tablet  Commonly known as: ADVIL,MOTRIN   600 mg, Oral, Every 6 Hours PRN      oxyCODONE 5 MG immediate release tablet  Commonly known as: Roxicodone   5 mg, Oral, Every 4 Hours PRN             Continue These Medications        Instructions Start Date   valACYclovir 1000 MG tablet  Commonly known as: VALTREX   1,000 mg, Oral, Daily             Stop These Medications      folic acid 1 MG tablet  Commonly known as: FOLVITE     ondansetron ODT 4 MG disintegrating tablet  Commonly known as: ZOFRAN-ODT     pantoprazole 20 MG EC tablet  Commonly known as: Protonix                Disposition: Home  Diet: Regular    Activity: Pelvic rest 6 weeks    Condition at discharge: Good    Follow up with: Dr. Indira Ruiz DO or provider of her choice    Follow up in: Routine postpartum check 3-4 weeks      Complications: None       Signature: Electronically signed by Indira Ruiz DO, 04/14/25, 1:35 PM EDT.        [facility] LABOR AND DELIVERY  [address] [place] [address]  Dept: [phone]  Dept Fax: [phone]  Loc: [phone]

## 2025-04-12 NOTE — ANESTHESIA PROCEDURE NOTES
Spinal Block    Pre-sedation assessment completed: 4/12/2025 11:15 AM    Patient reassessed immediately prior to procedure    Patient location during procedure: OB  Start Time: 4/12/2025 11:17 AM  Stop Time: 4/12/2025 11:32 AM  Indication:at surgeon's request, post-op pain management and procedure for pain  Performed By  CRNA/CAA: Di Watters, CRNA  Preanesthetic Checklist  Completed: patient identified, IV checked, site marked, risks and benefits discussed, surgical consent, monitors and equipment checked, pre-op evaluation and timeout performed  Spinal Block Prep:  Patient Position:sitting  Sterile Tech:cap, gloves, mask and sterile barriers  Prep:Betadine  Patient Monitoring:blood pressure monitoring, continuous pulse oximetry and EKG    Spinal Block Procedure  Approach:midline  Guidance:landmark technique and palpation technique  Location:L4-L5  Needle Type:Pencan  Needle Gauge:24 G  Placement of Spinal needle event:cerebrospinal fluid aspirated  Paresthesia: no  Fluid Appearance:clear  Medications: morphine PF (DURAMORPH) injection - Intrathecal   0.15 mg - 4/12/2025 11:22:00 AM  bupivacaine PF (MARCAINE) injection 0.75% - Intrathecal   1.4 mL - 4/12/2025 11:22:00 AM   Post Assessment  Patient Tolerance:patient tolerated the procedure well with no apparent complications  Complications no

## 2025-04-12 NOTE — ANESTHESIA PREPROCEDURE EVALUATION
Anesthesia Evaluation     Patient summary reviewed   no history of anesthetic complications:   NPO Solid Status: > 8 hours  NPO Liquid Status: > 8 hours           Airway   Mallampati: II  TM distance: >3 FB  Neck ROM: full  No difficulty expected  Dental - normal exam     Pulmonary - negative pulmonary ROS and normal exam   Cardiovascular - negative cardio ROS and normal exam  Exercise tolerance: good (4-7 METS)        Neuro/Psych  (+) headaches, numbness, psychiatric history Anxiety and Depression    ROS Comment: History of ACM repaired   Pseudotumor Cerebri   Neuro team deemed spinal anesthesia okay; no evidence of Increased intracranial pressure   GI/Hepatic/Renal/Endo    (+) obesity, morbid obesity, GERD    Musculoskeletal     (+) radiculopathy Left lower extremity      ROS comment: Sciatica with pregnancy   Abdominal   (+) obese   Substance History   (+) drug use      Comment: Marijuana    OB/GYN    (+) Pregnant        Other                    Anesthesia Plan    ASA 2     spinal       Anesthetic plan, risks, benefits, and alternatives have been provided, discussed and informed consent has been obtained with: patient.  Pre-procedure education provided  Plan discussed with CRNA.    CODE STATUS:    Code Status (Patient has no pulse and is not breathing): CPR (Attempt to Resuscitate)  Medical Interventions (Patient has pulse or is breathing): Full

## 2025-04-12 NOTE — DISCHARGE INSTRUCTIONS
DR. SEYMOUR'S POSTOP  DISCHARGE PRECAUTIONS and Answers to FAQs     NO SEX or tampons for SIX weeks.     NO DRIVING for TWO weeks. or while taking narcotic pain medications.     NO TUB BATH or POOL for FOUR weeks, shower only.       NO LIFTING more than 20 pounds for 2 week(s).     STAPLES (if present):  follow up at the office in the next 3-7 days to have them removed if they were not removed before discharge.  If your staples were removed already and steri-strips placed (or you just had steri-strips) REMOVE YOUR STERI STRIPS in TEN DAYS.      INCISION CARE:   WASH DAILY in the shower with soap and water (any type of soap is fine, it does not need to be antibacterial soap).  Look (or have a family member/friend look) at your incision EVERY DAY when you get home.  Keeping the incision DRY is extremely important.  Continue to keep a clean, dry wash cloth (to help wick away moisture) on your incision for 10 days.  Change out the wash cloth frequently (approximately every 2-8 hrs as needed to prevent it from getting moist).  REDNESS, PUS, increase in PAIN, FEVER or CHILLS are all reasons to be seen in our office immediately.  Go to the ER, if it is after hours or a weekend.         VAGINAL BLEEDING:  may continue on and off over the next several weeks after delivery and may increase slightly once you go home.  You should not be bleeding more than 1 large pad soaked every hour or two.  Clots (even the size of a lemon or larger) may be normal as long as the bleeding is not heavy and the clots do not continue.       FEVER or CHILLS or NOT FEELING WELL: call our office.  If the office is closed, you need to be seen in acute care or ER.       CHEST PAIN or SHORTNESS OF BREATH/AIR: you need to GO TO THE NEAREST ER or CALL 911.      SWELLING:  can increase over the next 7-10 days and then should slowly improve.  Your legs/ankles should be fairly similar in size.  A red, painful, hot, swollen leg (usually just one side)  can be a sign of a blood clot and should be evaluated immediately.  Call our office.  If it is after hours or a weekend, you must be seen IMMEDIATELY IN THE ER.      ELEVATED BLOOD PRESSURE:  you need to contact us if you are having  persistent elevated BP systolic (top number) more than 155 or diastolic (bottom number) more than 95, or a headache (not relieved with rest, hydration or over the counter pain reliever), an increase in your swelling (usually hands and face), changes in your vision (typically flashing white or black spots) or severe persistent pain in the location of the upper right side of your belly (under your right breast).  Call our office or go to ER if after hours or a weekend.     LACTATION QUESTIONS or CONCERNS?  Call [facility] Lactation support [phone].     WORK and SCHOOL TIME OFF: depends on your specific delivery type, surrounding circumstances, and your work insurance/school rules.  If you have questions, please call Kathia or Michelle at [phone] (ext. [phone]).  Or email Kathia at [email or web address].  They will assist in required paperwork for you and/or family members.      For further QUESTIONS or CONCERNS, please call [facility] CANDICE Gentile at [phone].       15-Oct-2024 16:00

## 2025-04-12 NOTE — PLAN OF CARE
Problem:  Delivery  Goal: Bleeding is Controlled  Outcome: Adequate for Care Transition  Goal: Stable Fetal Wellbeing  Outcome: Adequate for Care Transition  Goal: Absence of Infection Signs and Symptoms  Outcome: Adequate for Care Transition  Goal: Effective Oxygenation and Ventilation  Outcome: Adequate for Care Transition   Goal Outcome Evaluation:    Patient transferred to [facility]

## 2025-04-12 NOTE — PLAN OF CARE
Goal Outcome Evaluation:              Outcome Evaluation: Assessment and vital signs WNL. Up in chair with assist.

## 2025-04-12 NOTE — OP NOTE
OB Operative Note        Date of Service:  25     Pre-Operative Dx:   IUP at Gestational Age: 39w1d weeks    indication: Chiari malformation status post decompression    Postoperative dx:   Same as above    Procedure:   Primary LTCS    Surgeon:  Indira Ruiz DO    Assistant:  Dr. Gianfranco Lopez [facility] Hospitalist was responsible for performing the following activities:   Retraction, Suction, Irrigation, Fundal pressure to assist with delivery of the fetus, Closure of the right side of the fascia and their skilled assistance was necessary for the success of this case.     No resident assist available.    Anesthesia:  Spinal  CRNA: Di Watters CRNA    Estimated Blood Loss: 500 mls    Findings:   Normal uterus, Normal tubes, Normal ovaries    Infant: Gender:  male infant   Weight:   3940 g (8 lb 11 oz)   APGARS:  9  @ 1 minute / 9  @ 5 minutes    Specimens:    Cord blood, cord gases     Procedure Details:  The patient was brought to the operating room and spinal anesthesia was deemed to be adequate.  She was prepped and draped in a normal sterile fashion and placed in supine position with a leftward tilt.  A Pfannenstiel skin incision was made approximately 2 fingerbreadths above the symphysis pubis and carried down to the underlying layer of fascia.  The fascia was nicked in the midline and extended laterally with Guajardo scissors.  The superior and inferior aspects of the fascial incision were grasped with Kocher clamps and the underlying rectus muscle was dissected off bluntly.  The midline was identified and opened in a sharp fashion with no noted damage to underlying bowel, bladder, blood vessels, or organs.   The vesicouterine peritoneum was incised and the bladder flap was created.  The lower uterine segment was incised in a transverse fashion and extended laterally with bandage scissors.  Fluid was noted to be clear.  The fetal vertex was brought up atraumatically through the uterine incision.   The mouth and nares were bulb suctioned.  There was no nuchal cord.  The right anterior and left posterior shoulders were delivered easily.  The remainder of the body delivered.  The infant was vigorous.  The cord was clamped and cut after a delay of 60 seconds and the infant was handed off to [facility] baby care.  A segment of cord was handed off to the technician for collection of cord blood and cord gases.  The placenta delivered with the assistance of fundal massage and was discarded.  The uterus was exteriorized and cleared of all clots and debris.  The uterine incision was repaired with 0 Vicryl in a running fashion.  A second imbricating stitch was placed.  Excellent hemostasis was assured.       The uterus was placed back into the pelvic cavity.  Copious irrigation was performed.  The gutters were cleared of all clot and debris.  The uterine incision was reinspected off tension and noted to be hemostatic.  Interceed was placed over the uterine incision and anterior uterus.  The parietal peritoneum was closed.  The rectus muscles were reapproximated in the midline.  The rectus muscles and fascia were noted to be hemostatic.  The fascia was closed with 0 Vicryl in a running fashion starting at the bilateral angles and to the midline.  The subcutaneous tissue was copiously irrigated and made hemostatic.  It was reapproximated using monocryl and the skin was closed with staples.  Urine was clear at the end of the procedure.     The patient tolerated the procedure well.  Instrument, lap, and needle counts were correct.  The patient received antibiotics prior to the procedure, 2 g.  She was taken to the recovery room in stable condition.      Complications: None     Condition: Good     Disposition:  PACU           Electronically signed by:  Indira Ruiz DO, 04/12/25, 12:33 PM EDT.

## 2025-04-12 NOTE — PLAN OF CARE
Goal Outcome Evaluation:  Plan of Care Reviewed With: patient, significant other        Progress: improving  Outcome Evaluation: Patient admitted for scheduled primary . IV access and ordered labs obtained. VS WNL. FHR tracing reactive, irregular contractions noted on monitore.

## 2025-04-13 LAB
BASOPHILS # BLD AUTO: 0.02 10*3/MM3 (ref 0–0.2)
BASOPHILS NFR BLD AUTO: 0.2 % (ref 0–1.5)
DEPRECATED RDW RBC AUTO: 42.4 FL (ref 37–54)
EOSINOPHIL # BLD AUTO: 0.11 10*3/MM3 (ref 0–0.4)
EOSINOPHIL NFR BLD AUTO: 0.8 % (ref 0.3–6.2)
ERYTHROCYTE [DISTWIDTH] IN BLOOD BY AUTOMATED COUNT: 14.5 % (ref 12.3–15.4)
HCT VFR BLD AUTO: 28.5 % (ref 34–46.6)
HGB BLD-MCNC: 9.3 G/DL (ref 12–15.9)
HOLD SPECIMEN: NORMAL
IMM GRANULOCYTES # BLD AUTO: 0.07 10*3/MM3 (ref 0–0.05)
IMM GRANULOCYTES NFR BLD AUTO: 0.5 % (ref 0–0.5)
LYMPHOCYTES # BLD AUTO: 1.33 10*3/MM3 (ref 0.7–3.1)
LYMPHOCYTES NFR BLD AUTO: 10.2 % (ref 19.6–45.3)
MCH RBC QN AUTO: 26.7 PG (ref 26.6–33)
MCHC RBC AUTO-ENTMCNC: 32.6 G/DL (ref 31.5–35.7)
MCV RBC AUTO: 81.9 FL (ref 79–97)
MONOCYTES # BLD AUTO: 1.27 10*3/MM3 (ref 0.1–0.9)
MONOCYTES NFR BLD AUTO: 9.7 % (ref 5–12)
NEUTROPHILS NFR BLD AUTO: 10.26 10*3/MM3 (ref 1.7–7)
NEUTROPHILS NFR BLD AUTO: 78.6 % (ref 42.7–76)
NRBC BLD AUTO-RTO: 0 /100 WBC (ref 0–0.2)
PLATELET # BLD AUTO: 238 10*3/MM3 (ref 140–450)
PMV BLD AUTO: 11 FL (ref 6–12)
RBC # BLD AUTO: 3.48 10*6/MM3 (ref 3.77–5.28)
WBC NRBC COR # BLD AUTO: 13.06 10*3/MM3 (ref 3.4–10.8)

## 2025-04-13 PROCEDURE — 85025 COMPLETE CBC W/AUTO DIFF WBC: CPT | Performed by: OBSTETRICS & GYNECOLOGY

## 2025-04-13 PROCEDURE — 25010000002 KETOROLAC TROMETHAMINE PER 15 MG: Performed by: OBSTETRICS & GYNECOLOGY

## 2025-04-13 PROCEDURE — 25010000002 ENOXAPARIN PER 10 MG: Performed by: OBSTETRICS & GYNECOLOGY

## 2025-04-13 RX ORDER — HYDROCORTISONE ACETATE PRAMOXINE HCL 1; 1 G/100G; G/100G
CREAM TOPICAL 3 TIMES DAILY PRN
Qty: 30 G | Refills: 1 | Status: SHIPPED | OUTPATIENT
Start: 2025-04-13

## 2025-04-13 RX ORDER — DOCUSATE SODIUM 100 MG/1
100 CAPSULE, LIQUID FILLED ORAL 2 TIMES DAILY PRN
Qty: 60 CAPSULE | Refills: 0 | Status: SHIPPED | OUTPATIENT
Start: 2025-04-13

## 2025-04-13 RX ADMIN — KETOROLAC TROMETHAMINE 15 MG: 15 INJECTION, SOLUTION INTRAMUSCULAR; INTRAVENOUS at 00:40

## 2025-04-13 RX ADMIN — DOCUSATE SODIUM 100 MG: 100 CAPSULE, LIQUID FILLED ORAL at 09:13

## 2025-04-13 RX ADMIN — ACETAMINOPHEN 650 MG: 325 TABLET ORAL at 16:50

## 2025-04-13 RX ADMIN — ACETAMINOPHEN 1000 MG: 500 TABLET, FILM COATED ORAL at 10:38

## 2025-04-13 RX ADMIN — ACETAMINOPHEN 650 MG: 325 TABLET ORAL at 20:43

## 2025-04-13 RX ADMIN — IBUPROFEN 600 MG: 600 TABLET, FILM COATED ORAL at 17:59

## 2025-04-13 RX ADMIN — VALACYCLOVIR 1000 MG: 500 TABLET ORAL at 09:13

## 2025-04-13 RX ADMIN — ENOXAPARIN SODIUM 40 MG: 40 INJECTION SUBCUTANEOUS at 12:44

## 2025-04-13 RX ADMIN — ACETAMINOPHEN 1000 MG: 500 TABLET, FILM COATED ORAL at 03:45

## 2025-04-13 NOTE — PROGRESS NOTES
PostPartum/PostOp PROGRESS NOTE        Subjective:  Patient has no complaints  Pain controlled  Tolerating a regular diet  Passing flatus  Ambulating  Urinating spontaneously  Lochia decreasing, no bleeding concerns    Objective:     Temp:  [97.7 °F (36.5 °C)-98.4 °F (36.9 °C)] 98.4 °F (36.9 °C)  Heart Rate:  [83-99] 89  Resp:  [16] 16  BP: ()/(33-72) 113/59  General- NAD, alert and oriented, appropriate  Psych- Normal mood, good memory  Cardiovascular/Respiratory- CV- Regular rhythm, no murnurs, Resp- CTA to bases, no wheezes  Abdomen- Fundus firm, non tender, Soft, non distended, non tender, normal active bowel sounds, Bandage intact, and Fundus at U-1  Extremties/DTRs- +1 edema, bilaterally equal, no signs of DVT    Results from last 7 days   Lab Units 04/13/25  0557 04/12/25  0627   WBC 10*3/mm3 13.06* 12.76*   HEMOGLOBIN g/dL 9.3* 11.1*   HEMATOCRIT % 28.5* 33.2*   PLATELETS 10*3/mm3 238 273          Results from last 7 days   Lab Units 04/12/25  0627   TREPONEMA PALLIDUM AB TOTAL  Non-Reactive     Assessment:    Post-partum/postop Day:  1  The patient is not currently breastfeeding.      Plan:   Routine postpartum/postop care  Remove bandage  Shower  PO pain meds  Importance of wound care/keep clean and dry  Staple removal on DAY OF DISCHARGE and place steri strips  DC meds reviewed  Follow up scheduled  PP/PO precautions given  OB Hospitalist will see pt tomorrow and until discharge.  Plan discharge tomorrow              Electronically signed by Indira Ruiz DO, 04/13/25, 1:19 PM EDT.

## 2025-04-13 NOTE — PLAN OF CARE
Goal Outcome Evaluation:    AAO X3. Ambulating room. ADL, Independent. VS, WNL. Voiding Spontaneously. Fundus, Firm without Massage. Bleeding, Light. Abdominal Binder utilized. C/S dressing, clean and intact, no drainage noted. Pain controlled with scheduled pain medication. Attentive to NB needs.

## 2025-04-13 NOTE — ANESTHESIA POSTPROCEDURE EVALUATION
Patient: Mima Wolfe    Procedure Summary       Date: 25 Room / Location: [facility] LABOR DELIVERY  [facility] LABOR DELIVERY    Anesthesia Start: 1117 Anesthesia Stop: 1236    Procedure:  SECTION PRIMARY (Abdomen) Diagnosis: (, PRIMARY FOR INTERCRANIAL HTN,  CHIARI MALFORMATION, PSEUDO TUMOUR CEREBRI)    Surgeons: Indira Ruiz DO Provider: Di Watters CRNA    Anesthesia Type: spinal ASA Status: 2            Anesthesia Type: spinal    Vitals  Vitals Value Taken Time   /58 25 18:02   Temp 36.9 °C (98.606427 °F) 25 18:02   Pulse 93 25 18:02   Resp 16 25 18:00   SpO2 99 % 25 14:25   Vitals shown include unfiled device data.        Post Anesthesia Care and Evaluation    Patient location during evaluation: bedside  Patient participation: complete - patient participated  Level of consciousness: awake and alert  Pain score: 2  Pain management: adequate    Airway patency: patent  Anesthetic complications: No anesthetic complications  PONV Status: none  Cardiovascular status: acceptable  Respiratory status: acceptable  Hydration status: acceptable  Post Neuraxial Block status: Motor and sensory function returned to baseline and No signs or symptoms of PDPHNo anesthesia care post op

## 2025-04-13 NOTE — PLAN OF CARE
Problem: Adult Inpatient Plan of Care  Goal: Plan of Care Review  Outcome: Progressing  Goal: Patient-Specific Goal (Individualized)  Outcome: Progressing  Goal: Absence of Hospital-Acquired Illness or Injury  Outcome: Progressing  Goal: Optimal Comfort and Wellbeing  Outcome: Progressing  Goal: Readiness for Transition of Care  Outcome: Progressing     Problem: Postpartum ( Delivery)  Goal: Successful Parent Role Transition  Outcome: Progressing  Goal: Hemostasis  Outcome: Progressing  Goal: Effective Bowel Elimination  Outcome: Progressing  Goal: Fluid and Electrolyte Balance  Outcome: Progressing  Goal: Absence of Infection Signs and Symptoms  Outcome: Progressing  Goal: Anesthesia/Sedation Recovery  Outcome: Progressing  Goal: Optimal Pain Control and Function  Outcome: Progressing  Goal: Nausea and Vomiting Relief  Outcome: Progressing  Goal: Effective Urinary Elimination  Outcome: Progressing  Goal: Effective Oxygenation and Ventilation  Outcome: Progressing   Goal Outcome Evaluation:plans to discharge tomorrow

## 2025-04-14 VITALS
OXYGEN SATURATION: 98 % | SYSTOLIC BLOOD PRESSURE: 116 MMHG | TEMPERATURE: 98.1 F | WEIGHT: 249 LBS | HEART RATE: 89 BPM | DIASTOLIC BLOOD PRESSURE: 73 MMHG | RESPIRATION RATE: 16 BRPM | BODY MASS INDEX: 42.51 KG/M2 | HEIGHT: 64 IN

## 2025-04-14 PROCEDURE — 25010000002 ENOXAPARIN PER 10 MG: Performed by: OBSTETRICS & GYNECOLOGY

## 2025-04-14 RX ORDER — OXYCODONE HYDROCHLORIDE 5 MG/1
5 TABLET ORAL EVERY 4 HOURS PRN
Qty: 10 TABLET | Refills: 0 | Status: SHIPPED | OUTPATIENT
Start: 2025-04-14

## 2025-04-14 RX ADMIN — ACETAMINOPHEN 650 MG: 325 TABLET ORAL at 03:12

## 2025-04-14 RX ADMIN — IBUPROFEN 600 MG: 600 TABLET, FILM COATED ORAL at 00:13

## 2025-04-14 RX ADMIN — DOCUSATE SODIUM 100 MG: 100 CAPSULE, LIQUID FILLED ORAL at 10:09

## 2025-04-14 RX ADMIN — VALACYCLOVIR 1000 MG: 500 TABLET ORAL at 10:09

## 2025-04-14 RX ADMIN — ENOXAPARIN SODIUM 40 MG: 40 INJECTION SUBCUTANEOUS at 00:13

## 2025-04-14 RX ADMIN — ENOXAPARIN SODIUM 40 MG: 40 INJECTION SUBCUTANEOUS at 13:00

## 2025-04-14 RX ADMIN — IBUPROFEN 600 MG: 600 TABLET, FILM COATED ORAL at 13:00

## 2025-04-14 RX ADMIN — ALUMINUM HYDROXIDE, MAGNESIUM HYDROXIDE, AND DIMETHICONE 15 ML: 400; 400; 40 SUSPENSION ORAL at 08:38

## 2025-04-14 RX ADMIN — ACETAMINOPHEN 650 MG: 325 TABLET ORAL at 09:59

## 2025-04-14 RX ADMIN — IBUPROFEN 600 MG: 600 TABLET, FILM COATED ORAL at 05:30

## 2025-04-14 NOTE — PROGRESS NOTES
[facility] Delivery Progress Note    Subjective   Postpartum Day 2: [facility] Delivery    The patient feels well.  Her pain is well controlled.   She is ambulating well.  Patient describes her bleeding as moderate lochia.  Patient is tolerating regular diet and urinating without difficulty.    Breastfeeding: declines.    Objective     Vital Signs Range for the last 24 hours  Temperature: Temp:  [97.9 °F (36.6 °C)-98.5 °F (36.9 °C)] 97.9 °F (36.6 °C)   Temp Source: Temp src: Oral   BP: BP: (110-116)/(58-59) 110/59   Pulse: Heart Rate:  [74-93] 74   Respirations: Resp:  [16-18] 18     Physical Exam:  General:  no acute distress.  Abdomen: abdomen is soft without significant tenderness, masses, organomegaly or guarding.   Fundus: appropriate, firm, non tender, moderate lochia  Incision: clean, dry and intact  Extremities: normal, atraumatic, no cyanosis, and trace edema.     Rubella:   Rubella Antibodies, IgG   Date Value Ref Range Status   09/18/2024 4.56 Immune >0.99 index Final     Comment:                                     Non-immune       <0.90                                  Equivocal  0.90 - 0.99                                  Immune           >0.99     Rh Status:    RH type   Date Value Ref Range Status   04/12/2025 Negative  Final     Immunizations:   Immunization History   Administered Date(s) Administered    COVID-19 (PFIZER) Purple Cap Monovalent 08/24/2021, 09/23/2021    Flu Vaccine Intradermal Quad 18-64YR 11/15/2021    Flu Vaccine Split Quad 10/26/2020    Fluzone  >6mos 11/15/2024    Fluzone (or Fluarix & Flulaval for VFC) >6mos 10/26/2020    Influenza Seasonal Injectable 11/15/2021    Influenza, Unspecified 10/20/2022    Rho (D) Immune Globulin 01/24/2025    Tdap 07/20/2009, 01/24/2025    Varicella 07/20/2009, 12/31/2009       Assessment & Plan       Arnold-Chiari malformation    Difficulty swallowing pills    Pseudotumor cerebri    HSV serum positive I and II    Anxiety and depression     Obesity in pregnancy, antepartum    Supervision of normal first pregnancy, antepartum    GBS (group B Streptococcus carrier), +RV culture, currently pregnant    Single delivery by       Mima Wolfe is Day 2  post-partum  , Low Transverse  .      Plan:  Continue current care.  Doing well;  d/c home;  remove staples today      Electronically signed by Lashawn Sanches MD, 25, 9:37 AM EDT.

## 2025-04-14 NOTE — PLAN OF CARE
Goal Outcome Evaluation:  Plan of Care Reviewed With: patient           Outcome Evaluation: Up ad caroline. Pain controlled with scheduled Motrin and Tylenol. Attentive to baby's needs

## 2025-04-14 NOTE — PLAN OF CARE
Goal Outcome Evaluation:  Plan of Care Reviewed With: patient           Outcome Evaluation: Up ad caroline. voiding without difficulty.  Bonding well with baby.  Pain controlled with scheduled motrin & tylenol.

## 2025-04-15 ENCOUNTER — MATERNAL SCREENING (OUTPATIENT)
Dept: CALL CENTER | Facility: HOSPITAL | Age: 30
End: 2025-04-15
Payer: COMMERCIAL

## 2025-04-15 NOTE — OUTREACH NOTE
Maternal Screening Survey      Flowsheet Row Responses   Eligibility Eligible   Prep survey completed? Yes   Facility patient discharged from? [facility] - Registered Nurse

## 2025-04-18 ENCOUNTER — HOSPITAL ENCOUNTER (EMERGENCY)
Facility: HOSPITAL | Age: 30
Discharge: HOME | End: 2025-04-19
Attending: EMERGENCY MEDICINE
Payer: COMMERCIAL

## 2025-04-18 ENCOUNTER — TELEPHONE (OUTPATIENT)
Dept: OBSTETRICS AND GYNECOLOGY | Age: 30
End: 2025-04-18
Payer: COMMERCIAL

## 2025-04-18 ENCOUNTER — HOSPITAL ENCOUNTER (OUTPATIENT)
Facility: HOSPITAL | Age: 30
Discharge: HOME | End: 2025-04-18
Attending: OBSTETRICS & GYNECOLOGY | Admitting: OBSTETRICS & GYNECOLOGY
Payer: COMMERCIAL

## 2025-04-18 VITALS
HEART RATE: 83 BPM | SYSTOLIC BLOOD PRESSURE: 125 MMHG | RESPIRATION RATE: 18 BRPM | OXYGEN SATURATION: 99 % | DIASTOLIC BLOOD PRESSURE: 78 MMHG

## 2025-04-18 DIAGNOSIS — G43.909 ACUTE MIGRAINE: Primary | ICD-10-CM

## 2025-04-18 LAB
ALBUMIN SERPL-MCNC: 3.2 G/DL (ref 3.5–5.2)
ALBUMIN/GLOB SERPL: 0.9 G/DL
ALP SERPL-CCNC: 149 U/L (ref 39–117)
ALT SERPL W P-5'-P-CCNC: 18 U/L (ref 1–33)
ANION GAP SERPL CALCULATED.3IONS-SCNC: 10.2 MMOL/L (ref 5–15)
AST SERPL-CCNC: 16 U/L (ref 1–32)
BASOPHILS # BLD AUTO: 0.04 10*3/MM3 (ref 0–0.2)
BASOPHILS NFR BLD AUTO: 0.4 % (ref 0–1.5)
BILIRUB SERPL-MCNC: 0.2 MG/DL (ref 0–1.2)
BUN SERPL-MCNC: 7 MG/DL (ref 6–20)
BUN/CREAT SERPL: 9.9 (ref 7–25)
CALCIUM SPEC-SCNC: 9 MG/DL (ref 8.6–10.5)
CHLORIDE SERPL-SCNC: 105 MMOL/L (ref 98–107)
CO2 SERPL-SCNC: 22.8 MMOL/L (ref 22–29)
CREAT SERPL-MCNC: 0.71 MG/DL (ref 0.57–1)
CREAT UR-MCNC: 55.7 MG/DL
DEPRECATED RDW RBC AUTO: 42.6 FL (ref 37–54)
EGFRCR SERPLBLD CKD-EPI 2021: 119.7 ML/MIN/1.73
EOSINOPHIL # BLD AUTO: 0.38 10*3/MM3 (ref 0–0.4)
EOSINOPHIL NFR BLD AUTO: 4.2 % (ref 0.3–6.2)
ERYTHROCYTE [DISTWIDTH] IN BLOOD BY AUTOMATED COUNT: 14.1 % (ref 12.3–15.4)
GLOBULIN UR ELPH-MCNC: 3.4 GM/DL
GLUCOSE SERPL-MCNC: 89 MG/DL (ref 65–99)
HCT VFR BLD AUTO: 32.2 % (ref 34–46.6)
HGB BLD-MCNC: 10.2 G/DL (ref 12–15.9)
IMM GRANULOCYTES # BLD AUTO: 0.03 10*3/MM3 (ref 0–0.05)
IMM GRANULOCYTES NFR BLD AUTO: 0.3 % (ref 0–0.5)
LYMPHOCYTES # BLD AUTO: 2.1 10*3/MM3 (ref 0.7–3.1)
LYMPHOCYTES NFR BLD AUTO: 23.3 % (ref 19.6–45.3)
MCH RBC QN AUTO: 26.4 PG (ref 26.6–33)
MCHC RBC AUTO-ENTMCNC: 31.7 G/DL (ref 31.5–35.7)
MCV RBC AUTO: 83.2 FL (ref 79–97)
MONOCYTES # BLD AUTO: 0.82 10*3/MM3 (ref 0.1–0.9)
MONOCYTES NFR BLD AUTO: 9.1 % (ref 5–12)
NEUTROPHILS NFR BLD AUTO: 5.65 10*3/MM3 (ref 1.7–7)
NEUTROPHILS NFR BLD AUTO: 62.7 % (ref 42.7–76)
NRBC BLD AUTO-RTO: 0 /100 WBC (ref 0–0.2)
PLATELET # BLD AUTO: 348 10*3/MM3 (ref 140–450)
PMV BLD AUTO: 10.2 FL (ref 6–12)
POTASSIUM SERPL-SCNC: 4.1 MMOL/L (ref 3.5–5.2)
PROT ?TM UR-MCNC: 6.8 MG/DL
PROT SERPL-MCNC: 6.6 G/DL (ref 6–8.5)
PROT/CREAT UR: 0.12 MG/G{CREAT}
RBC # BLD AUTO: 3.87 10*6/MM3 (ref 3.77–5.28)
SODIUM SERPL-SCNC: 138 MMOL/L (ref 136–145)
WBC NRBC COR # BLD AUTO: 9.02 10*3/MM3 (ref 3.4–10.8)

## 2025-04-18 PROCEDURE — 96375 TX/PRO/DX INJ NEW DRUG ADDON: CPT

## 2025-04-18 PROCEDURE — 85025 COMPLETE CBC W/AUTO DIFF WBC: CPT | Performed by: OBSTETRICS & GYNECOLOGY

## 2025-04-18 PROCEDURE — 25810000003 SODIUM CHLORIDE 0.9 % SOLUTION: Performed by: EMERGENCY MEDICINE

## 2025-04-18 PROCEDURE — 25010000002 KETOROLAC TROMETHAMINE PER 15 MG: Performed by: EMERGENCY MEDICINE

## 2025-04-18 PROCEDURE — G0463 HOSPITAL OUTPT CLINIC VISIT: HCPCS

## 2025-04-18 PROCEDURE — 25010000002 DIPHENHYDRAMINE PER 50 MG: Performed by: EMERGENCY MEDICINE

## 2025-04-18 PROCEDURE — 25010000002 PROCHLORPERAZINE 10 MG/2ML SOLUTION: Performed by: EMERGENCY MEDICINE

## 2025-04-18 PROCEDURE — 80053 COMPREHEN METABOLIC PANEL: CPT | Performed by: OBSTETRICS & GYNECOLOGY

## 2025-04-18 PROCEDURE — 84156 ASSAY OF PROTEIN URINE: CPT | Performed by: OBSTETRICS & GYNECOLOGY

## 2025-04-18 PROCEDURE — 82570 ASSAY OF URINE CREATININE: CPT | Performed by: OBSTETRICS & GYNECOLOGY

## 2025-04-18 PROCEDURE — 99283 EMERGENCY DEPT VISIT LOW MDM: CPT

## 2025-04-18 PROCEDURE — 96374 THER/PROPH/DIAG INJ IV PUSH: CPT

## 2025-04-18 RX ORDER — KETOROLAC TROMETHAMINE 30 MG/ML
30 INJECTION, SOLUTION INTRAMUSCULAR; INTRAVENOUS ONCE
Status: COMPLETED | OUTPATIENT
Start: 2025-04-19 | End: 2025-04-18

## 2025-04-18 RX ORDER — PROCHLORPERAZINE EDISYLATE 5 MG/ML
10 INJECTION INTRAMUSCULAR; INTRAVENOUS ONCE
Status: COMPLETED | OUTPATIENT
Start: 2025-04-19 | End: 2025-04-18

## 2025-04-18 RX ORDER — SODIUM CHLORIDE 0.9 % (FLUSH) 0.9 %
10 SYRINGE (ML) INJECTION AS NEEDED
Status: DISCONTINUED | OUTPATIENT
Start: 2025-04-18 | End: 2025-04-19 | Stop reason: HOSPADM

## 2025-04-18 RX ORDER — DIPHENHYDRAMINE HYDROCHLORIDE 50 MG/ML
12.5 INJECTION, SOLUTION INTRAMUSCULAR; INTRAVENOUS ONCE
Status: COMPLETED | OUTPATIENT
Start: 2025-04-19 | End: 2025-04-18

## 2025-04-18 RX ADMIN — DIPHENHYDRAMINE HYDROCHLORIDE 12.5 MG: 50 INJECTION, SOLUTION INTRAMUSCULAR; INTRAVENOUS at 23:43

## 2025-04-18 RX ADMIN — KETOROLAC TROMETHAMINE 30 MG: 30 INJECTION, SOLUTION INTRAMUSCULAR; INTRAVENOUS at 23:43

## 2025-04-18 RX ADMIN — SODIUM CHLORIDE 500 ML: 9 INJECTION, SOLUTION INTRAVENOUS at 23:43

## 2025-04-18 RX ADMIN — PROCHLORPERAZINE EDISYLATE 10 MG: 5 INJECTION INTRAMUSCULAR; INTRAVENOUS at 23:43

## 2025-04-18 NOTE — TELEPHONE ENCOUNTER
Provider: DR SEYMOUR    Caller: Mima Wolfe    Relationship to Patient: Self    Pharmacy: [facility]    Phone Number: [phone]      Reason for Call: SWELLING / HEADACHE    When was the patient last seen: 04/12/25    When did it start: HEADACHE - LAST NIGHT - MIGRAINE     Where is it located: SWELLING FROM KNEES TO FEET    Characteristics of symptom/severity: INCREASED SWELLING IN LEGS AND FEET UNABLE TO TO WEAR SHOES - /80    Timing- Is it constant or intermittent: CONSTANT     What makes it worse: NO    What makes it better: NO    What therapies/medications have you tried: OVER THE COUNTER PAIN MEDS-NOT WORKING    UNABLE TO WARM TRANSFER

## 2025-04-18 NOTE — TELEPHONE ENCOUNTER
Spoke to the patient. She states she had also messaged Dr. Ruiz. She had checked her BP manually herself but was advised to get automated cuff. That is in route to her now. She will follow instructions as given by Dr. Ruiz.

## 2025-04-19 VITALS
OXYGEN SATURATION: 97 % | WEIGHT: 239.2 LBS | HEIGHT: 64 IN | SYSTOLIC BLOOD PRESSURE: 139 MMHG | HEART RATE: 60 BPM | DIASTOLIC BLOOD PRESSURE: 66 MMHG | RESPIRATION RATE: 17 BRPM | TEMPERATURE: 98.6 F | BODY MASS INDEX: 40.84 KG/M2

## 2025-04-19 NOTE — OBED NOTES
GYN Note  Date: 2025   Patient Name: Mima Wolfe  : 1995  MRN: [id number]  Primary Care Physician:  Wilbur Avendaño Jr., MD  Referring Physician: Lashawn Sanches MD  Date of admission: 2025      Patient Care Team:  Wilbur Avendaño Jr., MD as PCP - General (Family Medicine)    Chief complaint:  HA, swelling    History of present illness:  28yo P1 POD #6 s/p PCS secondary to chiari malformation.  Pt s/p decompression surgery in 2017 and still symp with valsalva.  Surgery and postop course unremarkable;  pt started with a HA yesterday that has gotten worse and not relieved with pain meds;  pt states she has dizziness with movement;  no weakness or numbness.  No RUQ pain    Review of Systems  Pertinent items are noted in HPI    Review of Systems   Constitutional:  Negative for fatigue and fever.   Respiratory:  Negative for shortness of breath.    Cardiovascular:  Negative for chest pain.   Gastrointestinal:  Negative for abdominal pain and nausea.   Genitourinary:  Negative for pelvic pain.   Musculoskeletal:  Positive for neck pain.   Neurological:  Positive for dizziness, light-headedness and headaches. Negative for seizures, facial asymmetry, speech difficulty, weakness and numbness.        History:  Past Medical History:   Diagnosis Date    Anxiety     Chiari malformation type I     Cholelithiasis     Depression     Disorder of SI (sacroiliac) joint 2025    Gastroesophageal reflux disease without esophagitis 2024    GERD (gastroesophageal reflux disease)     History of migraine with aura 2022    HSV serum positive I and II     IBS (irritable bowel syndrome)     Idiopathic intracranial hypertension     Sees Dr. Fontanez with [facility] PP    Ingrown toenail     Ovarian cyst     PCOS (polycystic ovarian syndrome) 2022    By US polycystic ovaries and chronic anovulation     ,   Past Surgical History:   Procedure Laterality Date    CERVICAL  CHIARI DECOMPRESSION POSTERIOR  10/03/2017     SECTION N/A 2025    Procedure:  SECTION PRIMARY;  Surgeon: Indira Ruiz DO;  Location: [facility] LABOR DELIVERY;  Service: Gynecology;  Laterality: N/A;    CHOLECYSTECTOMY      COLONOSCOPY N/A 11/15/2021    Procedure: COLONOSCOPY with biopsy;  Surgeon: Khari Carlson MD;  Location: [facility] ENDOSCOPY;  Service: Gastroenterology;  Laterality: N/A;  normal colon    ENDOSCOPY N/A 11/15/2021    Procedure: ESOPHAGOGASTRODUODENOSCOPY;  Surgeon: Khari Carlson MD;  Location: [facility] ENDOSCOPY;  Service: Gastroenterology;  Laterality: N/A;  normal EGD    EYE SURGERY      x2    WISDOM TOOTH EXTRACTION     ,   Family History   Problem Relation Age of Onset    Diabetes Father         borderline    Hypertension Father     Fibromyalgia Mother     Fibromyalgia Sister     Colon cancer Maternal Aunt 40    Colon cancer Maternal Uncle 30    Ovarian cancer Cousin 25    Malig Hyperthermia Neg Hx     Uterine cancer Neg Hx     Breast cancer Neg Hx     Deep vein thrombosis Neg Hx     Pulmonary embolism Neg Hx    ,   Social History     Socioeconomic History    Marital status: Single    Highest education level: High school graduate   Tobacco Use    Smoking status: Passive Smoke Exposure - Never Smoker     Passive exposure: Past    Smokeless tobacco: Never   Vaping Use    Vaping status: Never Used   Substance and Sexual Activity    Alcohol use: Not Currently     Comment: 1-2 times a month    Drug use: Never    Sexual activity: Yes     Partners: Male     Birth control/protection: None     E-cigarette/Vaping    E-cigarette/Vaping Use Never User     Passive Exposure No     Counseling Given No      E-cigarette/Vaping Substances    Nicotine No     THC No     CBD No     Flavoring No      Medications Prior to Admission   Medication Sig Dispense Refill Last Dose/Taking    acetaminophen (Tylenol) 325 MG tablet Take 2 tablets by mouth Every 6 (Six) Hours As Needed for Mild  Pain (alternate with motrin). 30 tablet 1     docusate sodium (Colace) 100 MG capsule Take 1 capsule by mouth 2 (Two) Times a Day As Needed for Constipation. 60 capsule 0     Hydrocortisone Ace-Pramoxine 1-1 % rectal cream Insert  into the rectum 3 (Three) Times a Day As Needed for Hemorrhoids. 30 g 1     ibuprofen (ADVIL,MOTRIN) 600 MG tablet Take 1 tablet by mouth Every 6 (Six) Hours As Needed for Mild Pain. 30 tablet 0     oxyCODONE (Roxicodone) 5 MG immediate release tablet Take 1 tablet by mouth Every 4 (Four) Hours As Needed for Moderate Pain. 10 tablet 0     valACYclovir (VALTREX) 1000 MG tablet Take 1 tablet by mouth Daily. 30 tablet 2        Scheduled Meds:    Continuous Infusions:  No current facility-administered medications for this encounter.    PRN Meds:    Allergies:  Metoclopramide and Retin-a [tretinoin]    Objective     Vital Signs   Heart Rate:  [74-91] 83  Resp:  [18] 18  BP: (125-133)/(62-78) 125/78    Physical Exam:    Physical Exam  Constitutional:       Appearance: She is obese.   Abdominal:      General: There is no distension.      Palpations: Abdomen is soft.      Tenderness: There is no abdominal tenderness.   Musculoskeletal:      Right lower leg: Edema present.      Left lower leg: Edema present.      Comments: Tr pitting edema bilat   Neurological:      Mental Status: She is alert.          Result Review    Result Review:  I have personally reviewed the following results and agree with these findings:  [x]  Laboratory  []  Microbiology  []  Radiology  []  EKG/Telemetry   []  Cardiology/Vascular   []  Pathology  []  Old records  []  Other:      Assessment & Plan     HA  Chiari malformation  Pseudotumor cerebri      Plan: labs and Bps wnl;  no evidence of PreE;  given history, d/w Dr. Thurston - will send down to ER for eval of HA    I discussed the patients findings and my recommendations with patient, nursing staff, and consulting provider.    Electronically signed by Lashawn Sanches  MD, 04/18/25, 9:08 PM EDT.

## 2025-04-19 NOTE — NURSING NOTE
, 6 days PP, was sent by Dr Ruiz for evaluation after having elevated blood pressures and headache since yesterday at home. Admitted to triage 1, labs collected and sent, serial B/Ps initiated.   Dr Sanches here on unit and notified.

## 2025-04-19 NOTE — ED PROVIDER NOTES
Time: 10:47 PM EDT  Date of encounter:  2025  Independent Historian/Clinical History and Information was obtained by:   Patient    History is limited by: N/A    Chief Complaint: Headache      History of Present Illness:  Patient is a 29 y.o. year old female who presents to the emergency department for evaluation of headache    Patient states that she has had a typical migraine-like headache for the past several days.  States she became concerned that she could be experiencing symptoms of preeclampsia given that she had a  section on 412.  She has history of intracranial hypertension and Chiari malformation and chronic migraines.  She denies any injuries.  No falls.  No numbness or weakness.  No fevers or chills.  No nausea or vomiting.  She was seen in labor and delivery earlier and cleared as far as preeclampsia is concerned and states that she feels relieved with this and just wishes headache relief at this time.  She declines any further workup including imaging.      Patient Care Team  Primary Care Provider: Wilbur Avendaño Jr., MD    Past Medical History:     Allergies   Allergen Reactions    Metoclopramide Shortness Of Breath    Retin-A [Tretinoin] Rash     Past Medical History:   Diagnosis Date    Anxiety     Chiari malformation type I     Cholelithiasis     Depression     Disorder of SI (sacroiliac) joint 2025    Gastroesophageal reflux disease without esophagitis 2024    GERD (gastroesophageal reflux disease)     History of migraine with aura 2022    HSV serum positive I and II     IBS (irritable bowel syndrome)     Idiopathic intracranial hypertension     Sees Dr. Fontanez with [facility]- rodolfo dash PP    Ingrown toenail     Ovarian cyst     PCOS (polycystic ovarian syndrome) 2022    By US polycystic ovaries and chronic anovulation       Past Surgical History:   Procedure Laterality Date    CERVICAL CHIARI DECOMPRESSION POSTERIOR  10/03/2017      SECTION N/A 2025    Procedure:  SECTION PRIMARY;  Surgeon: Indira Ruiz DO;  Location: [facility] LABOR DELIVERY;  Service: Gynecology;  Laterality: N/A;    CHOLECYSTECTOMY      COLONOSCOPY N/A 11/15/2021    Procedure: COLONOSCOPY with biopsy;  Surgeon: Khari Carlson MD;  Location: [facility] ENDOSCOPY;  Service: Gastroenterology;  Laterality: N/A;  normal colon    ENDOSCOPY N/A 11/15/2021    Procedure: ESOPHAGOGASTRODUODENOSCOPY;  Surgeon: Khari Carlson MD;  Location: [facility] ENDOSCOPY;  Service: Gastroenterology;  Laterality: N/A;  normal EGD    EYE SURGERY      x2    WISDOM TOOTH EXTRACTION       Family History   Problem Relation Age of Onset    Diabetes Father         borderline    Hypertension Father     Fibromyalgia Mother     Fibromyalgia Sister     Colon cancer Maternal Aunt 40    Colon cancer Maternal Uncle 30    Ovarian cancer Cousin 25    Malig Hyperthermia Neg Hx     Uterine cancer Neg Hx     Breast cancer Neg Hx     Deep vein thrombosis Neg Hx     Pulmonary embolism Neg Hx        Home Medications:  Prior to Admission medications    Medication Sig Start Date End Date Taking? Authorizing Provider   acetaminophen (Tylenol) 325 MG tablet Take 2 tablets by mouth Every 6 (Six) Hours As Needed for Mild Pain (alternate with motrin). 25   Indira Ruiz DO   docusate sodium (Colace) 100 MG capsule Take 1 capsule by mouth 2 (Two) Times a Day As Needed for Constipation. 25   Indira Ruiz, DO   Hydrocortisone Ace-Pramoxine 1-1 % rectal cream Insert  into the rectum 3 (Three) Times a Day As Needed for Hemorrhoids. 25   Indira Ruiz, DO   ibuprofen (ADVIL,MOTRIN) 600 MG tablet Take 1 tablet by mouth Every 6 (Six) Hours As Needed for Mild Pain. 25   Indira Ruiz, DO   oxyCODONE (Roxicodone) 5 MG immediate release tablet Take 1 tablet by mouth Every 4 (Four) Hours As Needed for Moderate Pain. 25   Lashawn Sanches MD   valACYclovir (VALTREX) 1000 MG tablet Take 1 tablet by mouth  "Daily. 2/27/25   Indira Ruiz DO        Social History:   Social History     Tobacco Use    Smoking status: Passive Smoke Exposure - Never Smoker     Passive exposure: Past    Smokeless tobacco: Never   Vaping Use    Vaping status: Never Used   Substance Use Topics    Alcohol use: Not Currently     Comment: 1-2 times a month    Drug use: Never         Review of Systems:  Review of Systems   Constitutional:  Negative for chills and fever.   HENT:  Negative for congestion, ear pain and sore throat.    Eyes:  Negative for pain.   Respiratory:  Negative for cough, chest tightness and shortness of breath.    Cardiovascular:  Negative for chest pain.   Gastrointestinal:  Negative for abdominal pain, diarrhea, nausea and vomiting.   Genitourinary:  Negative for flank pain and hematuria.   Musculoskeletal:  Negative for joint swelling.   Skin:  Negative for pallor.   Neurological:  Positive for headaches. Negative for seizures.   All other systems reviewed and are negative.       Physical Exam:  /66 (BP Location: Right arm, Patient Position: Sitting)   Pulse 60   Temp 98.6 °F (37 °C) (Oral)   Resp 17   Ht 162.6 cm (64\")   Wt 109 kg (239 lb 3.2 oz)   LMP 07/12/2024 (Exact Date)   SpO2 97%   Breastfeeding No   BMI 41.06 kg/m²     Physical Exam  Vitals and nursing note reviewed.   Constitutional:       General: She is not in acute distress.     Appearance: Normal appearance. She is not toxic-appearing.   HENT:      Head: Normocephalic and atraumatic.      Jaw: There is normal jaw occlusion.   Eyes:      General: Lids are normal.      Extraocular Movements: Extraocular movements intact.      Conjunctiva/sclera: Conjunctivae normal.      Pupils: Pupils are equal, round, and reactive to light.   Cardiovascular:      Rate and Rhythm: Normal rate and regular rhythm.      Pulses: Normal pulses.      Heart sounds: Normal heart sounds.   Pulmonary:      Effort: Pulmonary effort is normal. No respiratory distress.      " Breath sounds: Normal breath sounds. No wheezing or rhonchi.   Abdominal:      General: Abdomen is flat.      Palpations: Abdomen is soft.      Tenderness: There is no abdominal tenderness. There is no guarding or rebound.   Musculoskeletal:         General: Normal range of motion.      Cervical back: Normal range of motion and neck supple.      Right lower leg: No edema.      Left lower leg: No edema.   Skin:     General: Skin is warm and dry.   Neurological:      Mental Status: She is alert and oriented to person, place, and time. Mental status is at baseline.      Comments: NIHSS = 0   Psychiatric:         Mood and Affect: Mood normal.                Medical Decision Making:      Comorbidities that affect care:    PCOS, Chiari malformation, idiopathic intracranial hypertension    External Notes reviewed:    Previous Admission Note: Admission for  section 2025      The following orders were placed and all results were independently analyzed by me:  No orders of the defined types were placed in this encounter.      Medications Given in the Emergency Department:  Medications   sodium chloride 0.9 % bolus 500 mL (0 mL Intravenous Stopped 25 0009)   prochlorperazine (COMPAZINE) injection 10 mg (10 mg Intravenous Given 25)   diphenhydrAMINE (BENADRYL) injection 12.5 mg (12.5 mg Intravenous Given 25)   ketorolac (TORADOL) injection 30 mg (30 mg Intravenous Given 25)        ED Course:    ED Course as of 25 0714   Fri 2025   2248 Received a call from the OB hospitalist who has cleared the patient from a labor and delivery point of view.  She does not believe the patient's headache is related to her recent  section or hypertension. [JS]   Sat 2025   0010 Patient symptomatically improved after treatment Emergency Department.  She feels comfortable plan for discharge home and will follow-up with her neurologist as an outpatient. [JS]      ED Course  User Index  [JS] Zane Thurston MD       Labs:    Lab Results (last 24 hours)       Procedure Component Value Units Date/Time    Comprehensive Metabolic Panel [[id number]]  (Abnormal) Collected: 04/18/25 2029    Specimen: Blood Updated: 04/18/25 2102     Glucose 89 mg/dL      BUN 7 mg/dL      Creatinine 0.71 mg/dL      Sodium 138 mmol/L      Potassium 4.1 mmol/L      Chloride 105 mmol/L      CO2 22.8 mmol/L      Calcium 9.0 mg/dL      Total Protein 6.6 g/dL      Albumin 3.2 g/dL      ALT (SGPT) 18 U/L      AST (SGOT) 16 U/L      Alkaline Phosphatase 149 U/L      Total Bilirubin 0.2 mg/dL      Globulin 3.4 gm/dL      A/G Ratio 0.9 g/dL      BUN/Creatinine Ratio 9.9     Anion Gap 10.2 mmol/L      eGFR 119.7 mL/min/1.73     Narrative:      GFR Categories in Chronic Kidney Disease (CKD)      GFR Category          GFR (mL/min/1.73)    Interpretation  G1                     90 or greater         Normal or high (1)  G2                      60-89                Mild decrease (1)  G3a                   45-59                Mild to moderate decrease  G3b                   30-44                Moderate to severe decrease  G4                    15-29                Severe decrease  G5                    14 or less           Kidney failure          (1)In the absence of evidence of kidney disease, neither GFR category G1 or G2 fulfill the criteria for CKD.    eGFR calculation 2021 CKD-EPI creatinine equation, which does not include race as a factor    CBC & Differential [[id number]]  (Abnormal) Collected: 04/18/25 2029    Specimen: Blood Updated: 04/18/25 2037    Narrative:      The following orders were created for panel order CBC & Differential.  Procedure                               Abnormality         Status                     ---------                               -----------         ------                     CBC Auto Differential[[id number]]        Abnormal            Final result                 Please view results for  these tests on the individual orders.    Protein / Creatinine Ratio, Urine - Urine, Clean Catch [[id number]] Collected: 04/18/25 2029    Specimen: Urine, Clean Catch Updated: 04/18/25 2056     Creatinine, Urine 55.7 mg/dL      Total Protein, Urine 6.8 mg/dL      Protein/Creatinine Ratio, Urine 0.12    CBC Auto Differential [[id number]]  (Abnormal) Collected: 04/18/25 2029    Specimen: Blood Updated: 04/18/25 2037     WBC 9.02 10*3/mm3      RBC 3.87 10*6/mm3      Hemoglobin 10.2 g/dL      Hematocrit 32.2 %      MCV 83.2 fL      MCH 26.4 pg      MCHC 31.7 g/dL      RDW 14.1 %      RDW-SD 42.6 fl      MPV 10.2 fL      Platelets 348 10*3/mm3      Neutrophil % 62.7 %      Lymphocyte % 23.3 %      Monocyte % 9.1 %      Eosinophil % 4.2 %      Basophil % 0.4 %      Immature Grans % 0.3 %      Neutrophils, Absolute 5.65 10*3/mm3      Lymphocytes, Absolute 2.10 10*3/mm3      Monocytes, Absolute 0.82 10*3/mm3      Eosinophils, Absolute 0.38 10*3/mm3      Basophils, Absolute 0.04 10*3/mm3      Immature Grans, Absolute 0.03 10*3/mm3      nRBC 0.0 /100 WBC              Imaging:    No Radiology Exams Resulted Within Past 24 Hours      Differential Diagnosis and Discussion:    Headache: Differential diagnosis includes but is not limited to migraine, cluster headache, hypertension, tumor, subarachnoid bleeding, pseudotumor cerebri, temporal arteritis, infections, tension headache, and TMJ syndrome.    PROCEDURES:        No orders to display       Procedures    MDM                     Patient Care Considerations:    NARCOTICS: I considered prescribing opiate pain medication as an outpatient, however no narcotic pain control required in the ED.      Consultants/Shared Management Plan:    Consultant: I have discussed the case with Dr. Hill of OB GYN who states patient does not have evidence of preeclampsia or postpartum hypertension    Social Determinants of Health:    Patient has presented with family members who are responsible,  reliable and will ensure follow up care.      Disposition and Care Coordination:    Discharged: The patient is suitable and stable for discharge with no need for consideration of admission.    I have explained the patient´s condition, diagnoses and treatment plan based on the information available to me at this time. I have answered questions and addressed any concerns. The patient has a good  understanding of the patient´s diagnosis, condition, and treatment plan as can be expected at this point. The vital signs have been stable. The patient´s condition is stable and appropriate for discharge from the emergency department.      The patient will pursue further outpatient evaluation with the primary care physician or other designated or consulting physician as outlined in the discharge instructions. They are agreeable to this plan of care and follow-up instructions have been explained in detail. The patient has received these instructions in written format and has expressed an understanding of the discharge instructions. The patient is aware that any significant change in condition or worsening of symptoms should prompt an immediate return to this or the closest emergency department or call to 911.    Final diagnoses:   Acute migraine        ED Disposition       ED Disposition   Discharge    Condition   Stable    Comment   --               This medical record created using voice recognition software.             Zane Thurston MD  04/19/25 0714

## 2025-04-19 NOTE — NURSING NOTE
Dr Sanches at bedside, discussed POC, discussed case with Dr Thurston in ED. orders received to discharge and transfer down to ED for evaluation.

## 2025-04-23 ENCOUNTER — MATERNAL SCREENING (OUTPATIENT)
Dept: CALL CENTER | Facility: HOSPITAL | Age: 30
End: 2025-04-23
Payer: COMMERCIAL

## 2025-04-23 NOTE — OUTREACH NOTE
Maternal Screening Survey      Flowsheet Row Responses   Facility patient discharged from? [facility]   Attempt successful? Yes   Call start time 151   Call end time 1515   Person spoke with today (if not patient) and relationship Patient   I have been able to laugh and see the funny side of things. 0   I have looked forward with enjoyment to things. 0   I have blamed myself unnecessarily when things went wrong. 0   I have been anxious or worried for no good reason. 0   I have felt scared or panicky for no good reason. 0   Things have been getting on top of me. 0   I have been so unhappy that I have had difficulty sleeping. 0   I have felt sad or miserable. 0   I have been so unhappy that I have been crying. 0   The thought of harming myself has occurred to me. 0   [place]  Depression Scale Total 0   Did any of your parents have problems with alcohol or drug use? No   Do any of your peers have problems with alcohol or drug use? No   Does your partner have problems with alcohol or drug use? No   Before you were pregnant did you have problems with alcohol or drug use? (past) No   In the past month, did you drink beer, wine, liquor or use any other drugs? (pregnancy) No   Maternal Screening call completed Yes   Call end time 1515              Faye MAYERS - Registered Nurse              Faye MAYERS - Registered Nurse

## 2025-05-09 NOTE — PROGRESS NOTES
"POSTPARTUM Follow Up Visit      Chief Complaint   Patient presents with    Postpartum Care     HPI:    Date of delivery: 25  Delivery type:   LTCS  Delivering Provider:   Dr. Indira Ruiz        Feeding: Bottle  Pain:  no  Vaginal Bleeding:  no  Plans for BC:  IUD    Depressed/Anxious:  some anxiety  EPDS score: 0  #10: 0  History of anxiety and depression, no meds now, used to be on buspar, would like to restart    Weight at last OB OV:  249lb  Last pap date and result: Pap only negative  IGP,rfx Aptima HPV All Pth (2022 10:56)    Discharge Summary by Indira Ruiz DO (2025 12:36)    -Following lesion in groin in preg,  has now gone away  -Left-sided sciatica not bad, improved since delivery  -Belly button had drainage after replaced umb ring.  No more drainage and abscess like feeling is now gone.   -Needs refill zofran for HA's    PHYSICAL EXAM:  /67   Pulse 92   Ht 162.6 cm (64\")   Wt 102 kg (224 lb)   Breastfeeding No   BMI 38.45 kg/m²  Not found.  Chaperone present during breast and/or pelvic exam if performed.  General- NAD, alert and oriented, appropriate  Psych- Normal mood, good memory, good eye contact    INCISION : Clean, dry, intact, no evidence of infection  Abdomen- Soft, non distended, non tender, no masses.  Visualization deep within the umbilicus small area seen where she described drainage is seen.  No exudate.  No erythema.  Nontender.  No evidence of abscess.  Umbilical ring site clean without erythema.    External genitalia- Normal.    Urethra/Bladder/Vagina- Normal, no masses, non-tender  Prolapse : none noted      Cervix- Normal, no lesions, no discharge, no CMT  Uterus- Normal size, shape & consistency.  Non tender, mobile.  Adnexa- Normal, no mass, non-tender    Lymphatic- No palpable groin nodes  Extremities- No edema    ASSESSMENT AND PLAN:  Diagnoses and all orders for this visit:    1. Postpartum follow-up (Primary)    2. Birth control " counseling  Comments:  Mirena or Kyleena  Orders:  -     hCG, Quantitative, Pregnancy; Future    3. Anxiety and depression  -     busPIRone (BUSPAR) 5 MG tablet; Take 1 tablet by mouth 3 (Three) Times a Day As Needed (anxiety).  Dispense: 60 tablet; Refill: 3    4. Groin lesion  Comments:  resolved    5. Sciatica of left side  Comments:  improved    Other orders  -     ondansetron (ZOFRAN) 4 MG tablet; Take 1 tablet by mouth Every 8 (Eight) Hours As Needed for Nausea or Vomiting.  Dispense: 30 tablet; Refill: 1      Counseling:    All birth control options reviewed in detail.  R/B/A/SE/E of each wrt pts PMHx and prior BC use  May resume normal activities  Core strengthening exercises reviewed and recommended  Kegel exercises reviewed and recommended  Ok to return to work/school once patient desires/maternity leave completed  Abstinence until IUD/Nexplanon placed, [facility] day prior to placement        Follow Up:  Return for IUD placement.          Indira Ruiz DO  05/12/2025    [facility] [place] [address] [facility]  [address] [place] [address] [address] [place] [address]  Dept: [phone]  Dept Fax: [phone]  Loc: [phone]  Loc Fax: [phone]

## 2025-05-11 PROBLEM — Z34.00 SUPERVISION OF NORMAL FIRST PREGNANCY, ANTEPARTUM: Status: RESOLVED | Noted: 2024-08-30 | Resolved: 2025-05-11

## 2025-05-11 PROBLEM — F12.90 MARIJUANA USE DURING PREGNANCY: Status: RESOLVED | Noted: 2024-09-04 | Resolved: 2025-05-11

## 2025-05-11 PROBLEM — Q07.00 ARNOLD-CHIARI MALFORMATION: Status: RESOLVED | Noted: 2024-11-25 | Resolved: 2025-05-11

## 2025-05-11 PROBLEM — O99.320 MARIJUANA USE DURING PREGNANCY: Status: RESOLVED | Noted: 2024-09-04 | Resolved: 2025-05-11

## 2025-05-11 PROBLEM — G93.2 PSEUDOTUMOR CEREBRI: Status: RESOLVED | Noted: 2024-08-23 | Resolved: 2025-05-11

## 2025-05-11 PROBLEM — O99.210 OBESITY IN PREGNANCY, ANTEPARTUM: Status: RESOLVED | Noted: 2024-08-23 | Resolved: 2025-05-11

## 2025-05-12 ENCOUNTER — POSTPARTUM VISIT (OUTPATIENT)
Dept: OBSTETRICS AND GYNECOLOGY | Age: 30
End: 2025-05-12
Payer: COMMERCIAL

## 2025-05-12 VITALS
BODY MASS INDEX: 38.24 KG/M2 | WEIGHT: 224 LBS | DIASTOLIC BLOOD PRESSURE: 67 MMHG | HEART RATE: 92 BPM | SYSTOLIC BLOOD PRESSURE: 104 MMHG | HEIGHT: 64 IN

## 2025-05-12 DIAGNOSIS — F41.9 ANXIETY: ICD-10-CM

## 2025-05-12 DIAGNOSIS — Z30.09 BIRTH CONTROL COUNSELING: ICD-10-CM

## 2025-05-12 DIAGNOSIS — L98.9 SKIN LESION: ICD-10-CM

## 2025-05-12 DIAGNOSIS — M54.32 SCIATICA OF LEFT SIDE: ICD-10-CM

## 2025-05-12 RX ORDER — BUSPIRONE HYDROCHLORIDE 5 MG/1
5 TABLET ORAL 3 TIMES DAILY PRN
Qty: 60 TABLET | Refills: 3 | Status: SHIPPED | OUTPATIENT
Start: 2025-05-12

## 2025-05-12 RX ORDER — ONDANSETRON 4 MG/1
4 TABLET, FILM COATED ORAL EVERY 8 HOURS PRN
Qty: 30 TABLET | Refills: 1 | Status: SHIPPED | OUTPATIENT
Start: 2025-05-12

## 2025-05-14 ENCOUNTER — CLINICAL SUPPORT (OUTPATIENT)
Dept: OBSTETRICS AND GYNECOLOGY | Age: 30
End: 2025-05-14
Payer: COMMERCIAL

## 2025-05-14 DIAGNOSIS — Z30.09 BIRTH CONTROL COUNSELING: ICD-10-CM

## 2025-05-14 LAB — HCG INTACT+B SERPL-ACNC: <0.5 MIU/ML

## 2025-05-14 PROCEDURE — 84702 CHORIONIC GONADOTROPIN TEST: CPT | Performed by: OBSTETRICS & GYNECOLOGY

## 2025-05-14 NOTE — PROGRESS NOTES
"IUD Placement    Chief Complaint   Patient presents with    Contraception       Sex in last 2 weeks:  No  [facility]  Consent signed: yes  Last pap smear: Pap only negative  IGP,rfx Aptima HPV All Pth (2022 10:56)    Procedure was explained in detail.  She understands the potential risks include, but are not limited to, failure (pregnancy), pain, bleeding, uterine perforation, and infection.  Her questions have been answered.      Subjective:  29 y.o. No LMP recorded.    Due for pap in      Has had Mirena prior to her preg/delivery that expelled.  Maxwell did well in past     Objective:  /82   Pulse 76   Ht 162.6 cm (64\")   Wt 103 kg (227 lb)   Breastfeeding No   BMI 38.96 kg/m²   General- NAD, alert and oriented, appropriate  Psych- Normal mood, good memory  Abdomen- Soft, non distended, non tender, no masses  External genitalia- Normal, no lesions  Vagina- Normal, no discharge  Cervix- Normal without lesion or discharge    Paracervical block : Benzocaine 20% spray applied to cervix after verbal consent.  Tolerated well.      Uterus- Normal size, shape & consistency.  Non tender, mobile., Retroverted. Uterus sounded to 11cm    Betadine x3 (Hibiclens used if allergy).  Tenaculum placed on cervix.  Mirena IUD placed without difficulty.    Strings cut 3cm.      Patient tolerated the procedure well.  Chaperone present during pelvic exam.    Assessment and Plan:  Diagnoses and all orders for this visit:    1. Encounter for IUD insertion (Primary)  -     Levonorgestrel (MIRENA) 20 MCG/DAY IUD intrauterine device 1 each        Counseling:  She was counseled on the use of the IUD.  It provides contraception for 8years (Mirena/Liletta), 5years (Kyleena) or 3 years (Jaida) or 10 years (Copper).  Failure is <1%.  It does not protect her from STDs and condoms are recommended.  She has been instructed to check the strings monthly.       PRECAUTIONS-- If she has any fevers >101.5F, and " abdominal/pelvic pain, or bleeding > 1pad/2hours, she needs to call our office or on call/ER (after hours/weekend).  She understands the potential risk of pelvic inflammatory disease and the potential for an effect on her future fertility if she does not seek immediate evaluation.  Cramping due to the IUD should be controlled with a OTC reliever/NSAID.  If not, she should call our office.  If she misses a period and has a positive pregnancy test she must immediately seek medical attention due to the risk of ectopic pregnancy which can be life threatening.  She understands removal can sometimes be difficult and can require surgery.      Follow Up:  Return in about 1 month (around 6/15/2025) for WWE.          Indira Ruiz,   05/15/2025    [facility] [address] [facility]  [address] [place] [address] [address] [place] [address]  Dept: [phone]  Dept Fax: [phone]  Loc: [phone]  Loc Fax: [phone]

## 2025-05-14 NOTE — PROGRESS NOTES
Venipuncture Blood Specimen Collection  Venipuncture performed in left arm by Slime Montesinos with good hemostasis. Patient tolerated the procedure well without complications.   05/14/25   Slime Montesinos

## 2025-05-15 ENCOUNTER — PROCEDURE VISIT (OUTPATIENT)
Dept: OBSTETRICS AND GYNECOLOGY | Age: 30
End: 2025-05-15
Payer: COMMERCIAL

## 2025-05-15 VITALS
WEIGHT: 227 LBS | DIASTOLIC BLOOD PRESSURE: 82 MMHG | BODY MASS INDEX: 38.76 KG/M2 | HEART RATE: 76 BPM | SYSTOLIC BLOOD PRESSURE: 116 MMHG | HEIGHT: 64 IN

## 2025-05-15 DIAGNOSIS — Z30.430 ENCOUNTER FOR IUD INSERTION: Primary | ICD-10-CM

## 2025-08-13 ENCOUNTER — TELEPHONE (OUTPATIENT)
Dept: GASTROENTEROLOGY | Facility: CLINIC | Age: 30
End: 2025-08-13
Payer: COMMERCIAL

## (undated) DEVICE — GOWN,SIRUS,POLYRNF,BRTHSLV,XLN/XXL,18/CS: Brand: MEDLINE

## (undated) DEVICE — TRY CATH FOL ADVANCE SIL W/BAG 16F

## (undated) DEVICE — SUT VICRYL 3/0 CT1 27IN J258H

## (undated) DEVICE — DEV TRANSF BLD W/LUER ADPT CA/198

## (undated) DEVICE — COLON KIT: Brand: MEDLINE INDUSTRIES, INC.

## (undated) DEVICE — EGD OR ERCP KIT: Brand: MEDLINE INDUSTRIES, INC.

## (undated) DEVICE — STERILE POLYISOPRENE POWDER-FREE SURGICAL GLOVES WITH EMOLLIENT COATING: Brand: PROTEXIS

## (undated) DEVICE — SUT CHRM 0 CT1 36IN 924H

## (undated) DEVICE — GLV SURG BIOGEL LTX PF 6 1/2

## (undated) DEVICE — PAD GRND REM POLYHESIVE A/ DISP

## (undated) DEVICE — SUT MNCRYL 3/0 CT1 36 IN Y944H

## (undated) DEVICE — THE STERILE LIGHT HANDLE COVER IS USED WITH STERIS SURGICAL LIGHTING AND VISUALIZATION SYSTEMS.

## (undated) DEVICE — VIOLET BRAIDED (POLYGLACTIN 910), SYNTHETIC ABSORBABLE SUTURE: Brand: COATED VICRYL

## (undated) DEVICE — SOL IRRG H2O PL/BG 1000ML STRL

## (undated) DEVICE — INTENDED FOR TISSUE SEPARATION, AND OTHER PROCEDURES THAT REQUIRE A SHARP SURGICAL BLADE TO PUNCTURE OR CUT.: Brand: BARD-PARKER ® CARBON RIB-BACK BLADES

## (undated) DEVICE — Device: Brand: DEFENDO AIR/WATER/SUCTION AND BIOPSY VALVE

## (undated) DEVICE — PROXIMATE RH ROTATING HEAD SKIN STAPLERS (35 WIDE) CONTAINS 35 STAINLESS STEEL STAPLES: Brand: PROXIMATE

## (undated) DEVICE — SINGLE-USE BIOPSY FORCEPS: Brand: RADIAL JAW 4

## (undated) DEVICE — CVR HNDL LT SURG ACCSSRY BLU STRL

## (undated) DEVICE — C SECTION PACK: Brand: MEDLINE INDUSTRIES, INC.

## (undated) DEVICE — NEEDLE,18GX1.5",REG,BEVEL: Brand: MEDLINE

## (undated) DEVICE — Device: Brand: PORTEX

## (undated) DEVICE — SUT VIC 0 CTX 36IN J978H